# Patient Record
Sex: FEMALE | Race: BLACK OR AFRICAN AMERICAN | Employment: OTHER | URBAN - METROPOLITAN AREA
[De-identification: names, ages, dates, MRNs, and addresses within clinical notes are randomized per-mention and may not be internally consistent; named-entity substitution may affect disease eponyms.]

---

## 2017-02-15 ENCOUNTER — OFFICE VISIT (OUTPATIENT)
Dept: CARDIOLOGY CLINIC | Age: 82
End: 2017-02-15

## 2017-02-15 DIAGNOSIS — Z95.0 CARDIAC PACEMAKER IN SITU: Primary | ICD-10-CM

## 2017-04-18 ENCOUNTER — OFFICE VISIT (OUTPATIENT)
Dept: CARDIOLOGY CLINIC | Age: 82
End: 2017-04-18

## 2017-04-18 DIAGNOSIS — Z95.0 CARDIAC PACEMAKER IN SITU: Primary | ICD-10-CM

## 2017-06-21 ENCOUNTER — OFFICE VISIT (OUTPATIENT)
Dept: CARDIOLOGY CLINIC | Age: 82
End: 2017-06-21

## 2017-06-21 VITALS
DIASTOLIC BLOOD PRESSURE: 70 MMHG | HEIGHT: 65 IN | HEART RATE: 73 BPM | RESPIRATION RATE: 16 BRPM | WEIGHT: 162.8 LBS | OXYGEN SATURATION: 97 % | SYSTOLIC BLOOD PRESSURE: 128 MMHG | BODY MASS INDEX: 27.12 KG/M2

## 2017-06-21 DIAGNOSIS — I10 ESSENTIAL HYPERTENSION: ICD-10-CM

## 2017-06-21 DIAGNOSIS — I44.1 MOBITZ (TYPE) II ATRIOVENTRICULAR BLOCK: ICD-10-CM

## 2017-06-21 DIAGNOSIS — I35.0 MILD AORTIC STENOSIS: ICD-10-CM

## 2017-06-21 DIAGNOSIS — R06.02 SOB (SHORTNESS OF BREATH): Primary | ICD-10-CM

## 2017-06-21 NOTE — PROGRESS NOTES
Ally Dillon MD. [facility] [place]              Patient: Kristina Jain  : 1931      Today's Date: 2017            HISTORY OF PRESENT ILLNESS:     History of Present Illness:  Ms. Berenice Torres is here for follow-up. She had eye surgery and can see better. She gets out of breath when she bends over or walks too fast - happening past several months. She uses a cane to walk. No orthopnea or SOB at rest.  No CP. PAST MEDICAL HISTORY:     Past Medical History:   Diagnosis Date    Coronary atherosclerosis of native coronary artery     adeno card : fixed inf defect, EF 47%. Echo : EF 55%, mild MR.    Diabetes ([address]     HTN (hypertension)     Mild aortic stenosis     Mobitz (type) II atrioventricular block     Pure hypercholesterolemia          Past Surgical History:   Procedure Laterality Date    HX PACEMAKER      DDD, V lead Fx .  HX PACEMAKER      Gen Change    HX PACEMAKER      Ventricular lead failure. No access through stenosed left subclavian. No access from right side due to stenosis of SVC-RA junction. MEDICATIONS:     Current Outpatient Prescriptions   Medication Sig Dispense Refill    B.infantis-B.ani-B.long-B.bifi (PROBIOTIC 4X) 10-15 mg TbEC Take  by mouth daily.  levothyroxine (SYNTHROID) 50 mcg tablet Take  by mouth Daily (before breakfast).  Cholecalciferol, Vitamin D3, (VITAMIN D3) 1,000 unit cap Take 1,000 Units by mouth daily.  GLUCOSAMINE/CHONDROITIN SULF A (GLUCOSAMINE-CHONDROITIN PO) Take 2,000 mg by mouth.  amLODIPine (NORVASC) 5 mg tablet Take 5 mg by mouth daily.  ranitidine hcl 150 mg capsule Take 150 mg by mouth two (2) times a day.  latanoprost (XALATAN) 0.005 % ophthalmic solution Administer 1 Drop to both eyes nightly.  hydrochlorothiazide (HYDRODIURIL) 25 mg tablet Take 25 mg by mouth daily.  aspirin delayed-release (ADULT LOW DOSE ASPIRIN) 81 mg tablet Take 81 mg by mouth daily.       metformin (GLUCOPHAGE) 500 mg tablet Take  by mouth two (2) times daily (with meals).  METOPROLOL SUCCINATE PO Take 100 mg by mouth. Allergies   Allergen Reactions    Lisinopril Rash     Face swelling    Simvastatin Unknown (comments)     Dry lips and mouth             SOCIAL HISTORY:     Social History   Substance Use Topics    Smoking status: Former Smoker    Smokeless tobacco: Never Used    Alcohol use 0.0 oz/week     0 Standard drinks or equivalent per week      Comment: wine twice yearly         FAMILY HISTORY:     Family History   Problem Relation Age of Onset    Hypertension Mother              REVIEW OF SYSTEMS:       Review of Systems:    Constitutional: Negative for fever, chills    HEENT: Negative for vision changes.    Respiratory: Negative for cough    Cardiovascular: Negative for orthopnea, syncope, and PND.    Gastrointestinal: Negative for abdominal pain, diarrhea, or melena    Genitourinary: Negative for dysuria    Musculoskeletal: Negative for myalgias.    Skin: Negative for rash    Heme: No problems bleeding.    Neurological: Negative for speech change and focal weakness.    + chronic constipation                PHYSICAL EXAM:       Physical Exam:   Visit Vitals    /70    Pulse 73    Resp 16    Ht 5' 5\" (1.651 m)    Wt 162 lb 12.8 oz (73.8 kg)    SpO2 97%    BMI 27.09 kg/m2      Patient appears generally well, mood and affect are appropriate and pleasant.    HEENT: Normocephalic, atraumatic.  Hearing intact. Sclera anicteric.    Neck Exam: Supple, No JVD sitting up, + bilat neck bruits  Lung Exam: Clear to auscultation, even breath sounds.    Cardiac Exam: Regular rate and rhythm with 2/6 systolic mumur.    Abdomen: Soft, non-tender, normal bowel sounds.    Extremities: No lower extremity edema.  MAW  Psych - appropriate affect   Vasc - 2+ DP's  Neuro - non focal                   LABS / OTHER STUDIES:       Labs 2/23/15 - chol 172, HDL 44, , , CMP OK, TSH 1.2  Labs 1/1/116 - CMP OK, chol 198, HDL 51, , , TSH 1.2, A1c 7.3,   Labs 8/22/16 - CMP OK, chol 182, , HDL 47, , TSH 1, A1c 6.5  Labs 2/16 - chol 182, HDL 47, ,   Labs 2/17 - CMP Ok (Cr 1.17, glc 113), A1c 6.6    Lab Results   Component Value Date/Time    WBC 6.3 11/17/2016 11:36 AM    HGB 10.4 11/17/2016 11:36 AM    HCT 31.5 11/17/2016 11:36 AM    PLATELET 133 49/51/5174 11:36 AM    MCV 68 11/17/2016 11:36 AM           CARDIAC DIAGNOSTICS:       Cardiac Evaluation Includes:  Carotid dopplers 6/23/14 - 10-49% stenosis bilat  Echo 6/3/15 - TDS - mod LVH, LVEF 55%, mod MAC and mild MR, mild AS (mean gradient 14), mild LAE  Lexiscan Cardiolite 12/1/16 - + breast attenuation, but no ischemia. LVEF 51%. Low risk findings.       EKG 6/3/15 - A sensed, V paced  EKG 6/22/16 - V paced   EKG 6/21/17 - V paced                ASSESSMENT AND PLAN:       Assessment and Plan:    1) HTN    - BP looks good - continue current regimen      2) Carotid Artery disease - mild    - Carotid dopplers 6/23/14 - 10-49% stenosis bilat     3) Borderline Dyslipidemia    - A statin is reasonable given her DM and mild carotid disease.  I reviewed my recommendation for a statin.     - Ms. Teresa though says she does not want to take any statins due to potential side effects.    4) History of high grade Av block s/p pacer    - denies cardiac complaints    - Continue pacer checks as usual   - next check 6/30/17      5) Mild AS   - recheck an echo     6) BEDOYA for several months  - Lexiscan Cardiolite 12/1/16 - + breast attenuation, but no ischemia. LVEF 51%. Low risk findings. - proBNP was normal 11/16   - Check an echo   - workup thus far OK - encouraged her to exercise     6) Phone FU after testing. RTC in 6 months. Patient expressed understanding of the plan - questions were answered. Lives with her daughter.   Son in [place], [address] [facility] [place] [address]  [address], [address], Afia Sorto [address] [place] [address]  Ph: [phone]                                                             Ph [phone]           ADDENDUM   7/10/2017  Echo 7/7/17 - TDS. Mod LVH. LVEF 55%. Mild-mod AS (mean gradient 19. LAST 1.4). Mild LAE. RVSP 40    Workup thus far (stress test, proBNP, echo, labs) have been unremarkable / stable past several months. CXR today without acute process. I called patient and reviewed plan and findings. She will proceed with generator change. Repeat labs are pending. If problem persist, I've asked her to come back to see me earlier and I'll reassess.

## 2017-06-21 NOTE — PROGRESS NOTES
Visit Vitals    /70    Pulse 73    Resp 16    Ht 5' 5\" (1.651 m)    Wt 162 lb 12.8 oz (73.8 kg)    SpO2 97%    BMI 27.09 kg/m2     Chief Complaint   Patient presents with    Hypertension     annual follow up    Coronary Artery Disease    Cholesterol Problem     Pure hypercholesterolemia    Shortness of Breath     bending over or performing certain activities - due for pacemaker change this year

## 2017-06-21 NOTE — MR AVS SNAPSHOT
Visit Information Date & Time Provider Department Dept. Phone Encounter #  
 6/21/2017 10:00 AM Diony Bradley MD [facility] [phone] [id number] Your Appointments 6/30/2017 10:15 AM  
PACEMAKER with PACEMAKERYOSHI  
[facility] ([facility] SCHEDULING) Appt Note: med threshold/stf/b 2-15-17  near [address] [place] [address] [address] [place] [address]  
[phone]  
  
   
 [address] [place] [address]  
  
    
 7/7/2017 11:00 AM  
ECHO CARDIOGRAMS 2D with ECHODario [facility] ([place]EDULING) Appt Note: echo dx: AS dr [place] Offer [address] [address]  
[phone]  
  
   
 [address] [address]  
  
    
 12/6/2017 10:00 AM  
ESTABLISHED PATIENT with Diony Bradley MD  
[facility] ([place] SCHEDULING) Appt Note: 6 mo fu appt  [address] [employer] [address] [address] [phone]  
  
   
 [address] [address] Upcoming Health Maintenance Date Due  
 FOOT EXAM Q1 7/8/1941 MICROALBUMIN Q1 7/8/1941 EYE EXAM RETINAL OR DILATED Q1 7/8/1941 DTaP/Tdap/Td series (1 - Tdap) 7/8/1952 ZOSTER VACCINE AGE 60> 7/8/1991 GLAUCOMA SCREENING Q2Y 7/8/1996 OSTEOPOROSIS SCREENING (DEXA) 7/8/1996 Pneumococcal 65+ Low/Medium Risk (1 of 2 - PCV13) 7/8/1996 MEDICARE YEARLY EXAM 7/8/1996 HEMOGLOBIN A1C Q6M 8/19/2016 LIPID PANEL Q1 2/19/2017 INFLUENZA AGE 9 TO ADULT 8/1/2017 Allergies as of 6/21/2017  Review Complete On: 6/21/2017 By: Zulma Sharpe LPN Severity Noted Reaction Type Reactions Lisinopril  09/23/2010    Rash Face swelling Simvastatin  09/23/2010    Unknown (comments) Dry lips and mouth Current Immunizations  Never Reviewed No immunizations on file. Not reviewed this visit You Were Diagnosed With   
  
 Codes Comments SOB (shortness of breath)    -  Primary ICD-10-CM: R06.02 
ICD-9-CM: 786.05 Essential hypertension     ICD-10-CM: I10 
ICD-9-CM: 401.9 Mild aortic stenosis     ICD-10-CM: I35.0 ICD-9-CM: 424.1 Mobitz (type) II atrioventricular block     ICD-10-CM: I44.1 ICD-9-CM: 426.12 Vitals BP Pulse Resp Height(growth percentile) Weight(growth percentile) SpO2  
 128/70 73 16 5' 5\" (1.651 m) 162 lb 12.8 oz (73.8 kg) 97% BMI OB Status Smoking Status 27.09 kg/m2 Hysterectomy Former Smoker Vitals History BMI and BSA Data Body Mass Index Body Surface Area  
 27.09 kg/m 2 1.84 m 2 Preferred Pharmacy Pharmacy Name Phone [facility], [address] [phone] Your Updated Medication List  
  
   
This list is accurate as of: 6/21/17 10:41 AM.  Always use your most recent med list.  
  
  
  
  
 ADULT LOW DOSE ASPIRIN 81 mg tablet Generic drug:  aspirin delayed-release Take 81 mg by mouth daily. amLODIPine 5 mg tablet Commonly known as:  [place] Take 5 mg by mouth daily. GLUCOSAMINE-CHONDROITIN PO Take 2,000 mg by mouth. hydroCHLOROthiazide 25 mg tablet Commonly known as:  HYDRODIURIL Take 25 mg by mouth daily. levothyroxine 50 mcg tablet Commonly known as:  SYNTHROID Take  by mouth Daily (before breakfast). metFORMIN 500 mg tablet Commonly known as:  GLUCOPHAGE Take  by mouth two (2) times daily (with meals). METOPROLOL SUCCINATE PO Take 100 mg by mouth. PROBIOTIC 4X 10-15 mg Tbec Generic drug:  B.infantis-B.ani-B.long-B.bifi Take  by mouth daily. raNITIdine hcl 150 mg capsule Take 150 mg by mouth two (2) times a day. VITAMIN D3 1,000 unit Cap Generic drug:  cholecalciferol Take 1,000 Units by mouth daily. XALATAN 0.005 % ophthalmic solution Generic drug:  latanoprost  
 Administer 1 Drop to both eyes nightly. Introducing [facility]! David Marks introduces [employer] patient portal. Now you can access parts of your medical record, email your doctor's office, and request medication refills online. 1. In your internet browser, go to [email or web address] (app)t 2. Click on the First Time User? Click Here link in the Sign In box. You will see the New Member Sign Up page. 3. Enter your [employer]yo Access Code exactly as it appears below. You will not need to use this code after youve completed the sign-up process. If you do not sign up before the expiration date, you must request a new code. · [employer] Access Code: [email or web address] Expires: 9/19/2017 10:41 AM 
 
4. Enter the last four digits of your Social Security Number (xxxx) and Date of Birth (mm/dd/yyyy) as indicated and click Submit. You will be taken to the next sign-up page. 5. Create a Curiyo ID. This will be your C[employer] login ID and cannot be changed, so think of one that is secure and easy to remember. 6. Create a Curiyo password. You can change your password at any time. 7. Enter your Password Reset Question and Answer. This can be used at a later time if you forget your password. 8. Enter your e-mail address. You will receive e-mail notification when new information is available in [address] 9. Click Sign Up. You can now view and download portions of your medical record. 10. Click the Download Summary menu link to download a portable copy of your medical information. If you have questions, please visit the Frequently Asked Questions section of the C[employer] website. Remember, [employer] is NOT to be used for urgent needs. For medical emergencies, dial 911. Now available from your iPhone and Android! Please provide this summary of care documentation to your next provider. Your primary care clinician is listed as Adriana Arias.  If you have any questions after today's visit, please call [phone].

## 2017-07-05 ENCOUNTER — CLINICAL SUPPORT (OUTPATIENT)
Dept: CARDIOLOGY CLINIC | Age: 82
End: 2017-07-05

## 2017-07-05 DIAGNOSIS — Z95.0 CARDIAC PACEMAKER IN SITU: Primary | ICD-10-CM

## 2017-07-07 ENCOUNTER — CLINICAL SUPPORT (OUTPATIENT)
Dept: CARDIOLOGY CLINIC | Age: 82
End: 2017-07-07

## 2017-07-07 DIAGNOSIS — I10 ESSENTIAL HYPERTENSION: ICD-10-CM

## 2017-07-07 DIAGNOSIS — E78.00 PURE HYPERCHOLESTEROLEMIA: ICD-10-CM

## 2017-07-07 DIAGNOSIS — I35.0 MILD AORTIC STENOSIS: Primary | ICD-10-CM

## 2017-07-07 DIAGNOSIS — I44.1 MOBITZ (TYPE) II ATRIOVENTRICULAR BLOCK: ICD-10-CM

## 2017-07-10 ENCOUNTER — HOSPITAL ENCOUNTER (OUTPATIENT)
Dept: LAB | Age: 82
Discharge: HOME | End: 2017-07-10
Payer: MEDICARE

## 2017-07-10 ENCOUNTER — OFFICE VISIT (OUTPATIENT)
Dept: CARDIOLOGY CLINIC | Age: 82
End: 2017-07-10

## 2017-07-10 ENCOUNTER — HOSPITAL ENCOUNTER (OUTPATIENT)
Dept: GENERAL RADIOLOGY | Age: 82
Discharge: HOME | End: 2017-07-10
Payer: MEDICARE

## 2017-07-10 VITALS
RESPIRATION RATE: 16 BRPM | DIASTOLIC BLOOD PRESSURE: 60 MMHG | OXYGEN SATURATION: 95 % | SYSTOLIC BLOOD PRESSURE: 130 MMHG | HEART RATE: 60 BPM | WEIGHT: 163 LBS | HEIGHT: 65 IN | BODY MASS INDEX: 27.16 KG/M2

## 2017-07-10 DIAGNOSIS — R06.02 SHORTNESS OF BREATH: ICD-10-CM

## 2017-07-10 DIAGNOSIS — R06.02 SHORTNESS OF BREATH: Primary | ICD-10-CM

## 2017-07-10 DIAGNOSIS — Z01.818 PREOP TESTING: ICD-10-CM

## 2017-07-10 PROCEDURE — 85025 COMPLETE CBC W/AUTO DIFF WBC: CPT

## 2017-07-10 PROCEDURE — 85610 PROTHROMBIN TIME: CPT

## 2017-07-10 PROCEDURE — 80048 BASIC METABOLIC PNL TOTAL CA: CPT

## 2017-07-10 PROCEDURE — 71020 XR CHEST PA LAT: CPT

## 2017-07-10 PROCEDURE — 36415 COLL VENOUS BLD VENIPUNCTURE: CPT

## 2017-07-10 NOTE — PROGRESS NOTES
HISTORY OF PRESENTING ILLNESS      Teresa James is a 80 y.o. female with hypertension, diabetes, dyslipidemia, bradycardia and pacemaker whose pacemaker is noted to be nearing VASHTI. She presents to discuss pacemaker generator change. She has experienced SOB/BEDOYA recently. Echocardiogram was performed last Friday but has yet to be read. Recent bloodwork was performed by her PCP but not available for review yet. Denies recent long-distance travel/immobility. ACTIVE PROBLEM LIST     Patient Active Problem List    Diagnosis Date Noted    Mild aortic stenosis 06/22/2016    HTN (hypertension) 06/05/2013    Mobitz (type) II atrioventricular block     Pure hypercholesterolemia     Diabetes [address] [address]            PAST MEDICAL HISTORY     Past Medical History:   Diagnosis Date    Coronary atherosclerosis of native coronary artery     adeno card 2008: fixed inf defect, EF 47%. Echo 2008: EF 55%, mild MR.    Diabetes ([address]     HTN (hypertension)     Mild aortic stenosis     Mobitz (type) II atrioventricular block     Pure hypercholesterolemia            PAST SURGICAL HISTORY     Past Surgical History:   Procedure Laterality Date    HX PACEMAKER  2008    DDD, V lead Fx 2009.  HX PACEMAKER  2008    Gen Change    HX PACEMAKER  2009    Ventricular lead failure. No access through stenosed left subclavian. No access from right side due to stenosis of SVC-RA junction.            ALLERGIES     Allergies   Allergen Reactions    Lisinopril Rash     Face swelling    Simvastatin Unknown (comments)     Dry lips and mouth          FAMILY HISTORY     Family History   Problem Relation Age of Onset    Hypertension Mother     negative for cardiac disease       SOCIAL HISTORY     Social History     Social History    Marital status:      Spouse name: N/A    Number of children: N/A    Years of education: N/A     Social History Main Topics    Smoking status: Former Smoker    Smokeless tobacco: Never Used    Alcohol use 0.0 oz/week     0 Standard drinks or equivalent per week      Comment: wine twice yearly    Drug use: No    Sexual activity: Not Asked     Other Topics Concern    None     Social History Narrative         MEDICATIONS     Current Outpatient Prescriptions   Medication Sig    B.infantis-B.ani-B.long-B.bifi (PROBIOTIC 4X) 10-15 mg TbEC Take  by mouth daily.  levothyroxine (SYNTHROID) 50 mcg tablet Take  by mouth Daily (before breakfast).  Cholecalciferol, Vitamin D3, (VITAMIN D3) 1,000 unit cap Take 1,000 Units by mouth daily.  GLUCOSAMINE/CHONDROITIN SULF A (GLUCOSAMINE-CHONDROITIN PO) Take 2,000 mg by mouth.  amLODIPine (NORVASC) 5 mg tablet Take 5 mg by mouth daily.  ranitidine hcl 150 mg capsule Take 150 mg by mouth two (2) times a day.  latanoprost (XALATAN) 0.005 % ophthalmic solution Administer 1 Drop to both eyes nightly.  hydrochlorothiazide (HYDRODIURIL) 25 mg tablet Take 25 mg by mouth daily.  aspirin delayed-release (ADULT LOW DOSE ASPIRIN) 81 mg tablet Take 81 mg by mouth daily.  metformin (GLUCOPHAGE) 500 mg tablet Take  by mouth two (2) times daily (with meals).  METOPROLOL SUCCINATE PO Take 100 mg by mouth. No current facility-administered medications for this visit. I have reviewed the nurses notes, vitals, problem list, allergy list, medical history, family, social history and medications. REVIEW OF SYMPTOMS      General: Pt denies excessive weight gain or loss. Pt is able to conduct ADL's  HEENT: Denies blurred vision, headaches, hearing loss, epistaxis and difficulty swallowing. Respiratory: Denies cough, congestion, shortness of breath, BEDOYA, wheezing or stridor.   Cardiovascular: Denies precordial pain, palpitations, edema or PND  Gastrointestinal: Denies poor appetite, indigestion, abdominal pain or blood in stool  Genitourinary: Denies hematuria, dysuria, increased urinary frequency  Musculoskeletal: Denies joint pain or swelling from muscles or joints  Neurologic: Denies tremor, paresthesias, headache, or sensory motor disturbance  Psychiatric: Denies confusion, insomnia, depression  Integumentray: Denies rash, itching or ulcers. Hematologic: Denies easy bruising, bleeding     PHYSICAL EXAMINATION      Vitals:    07/10/17 0949   BP: 130/60   Pulse: 60   Resp: 16   SpO2: 95%   Weight: 163 lb (73.9 kg)   Height: 5' 5\" (1.651 m)     General: Well developed, in no acute distress. HEENT: No jaundice, oral mucosa moist, no oral ulcers  Neck: Supple, no stiffness, no lymphadenopathy, supple  Heart:  Normal S1/S2 negative S3 or S4. Regular, no murmur, gallop or rub, no jugular venous distention  Respiratory: Clear bilaterally x 4, no wheezing or rales  Abdomen:   Soft, non-tender, bowel sounds are active.   Extremities:  No edema, normal cap refill, no cyanosis. Musculoskeletal: No clubbing, no deformities  Neuro: A&Ox3, speech clear, gait stable, cooperative, no focal neurologic deficits  Skin: Skin color is normal. No rashes or lesions.  Non diaphoretic, moist.  Vascular: 2+ pulses symmetric in all extremities       DIAGNOST IC DATA     EKG: Sinus rhythm with ventricular pacing on 6/28/17       LABORATORY DATA      Lab Results   Component Value Date/Time    WBC 6.3 11/17/2016 11:36 AM    HGB 10.4 11/17/2016 11:36 AM    HCT 31.5 11/17/2016 11:36 AM    PLATELET 806 61/84/4180 11:36 AM    MCV 68 11/17/2016 11:36 AM      Lab Results   Component Value Date/Time    Sodium 132 06/07/2009 03:00 AM    Potassium 4.0 06/07/2009 03:00 AM    Chloride 97 06/07/2009 03:00 AM    CO2 26 06/07/2009 03:00 AM    Anion gap 9 06/07/2009 03:00 AM    Glucose 133 06/07/2009 03:00 AM    BUN 25 06/07/2009 03:00 AM    Creatinine 1.1 06/07/2009 03:00 AM    BUN/Creatinine ratio 23 06/07/2009 03:00 AM    GFR est AA >60 06/07/2009 03:00 AM    GFR est non-AA 51 06/07/2009 03:00 AM    Calcium 8.7 06/07/2009 03:00 AM    Bilirubin, total 0.6 06/02/2009 04:00 PM    AST (SGOT) 17 06/02/2009 04:00 PM    Alk. phosphatase 76 06/02/2009 04:00 PM    Protein, total 7.5 06/02/2009 04:00 PM    Albumin 4.2 06/02/2009 04:00 PM    Globulin 3.3 06/02/2009 04:00 PM    A-G Ratio 1.3 06/02/2009 04:00 PM    ALT (SGPT) 25 06/02/2009 04:00 PM           ASSESSMENT      1. Pacemaker  2. Diabetes  3. Hypertension  4. Dyslipidemia  5. Bradycardia       PLAN     Obtain copy of recent bloodwork. Obtain chest x-ray to further evaluate etiology of recent SOB. Follow up echocardiogram results from 3 days ago (not read yet). Plan for pacemaker generator change (unless EF has changed). FOLLOW-UP     1 week post procedure      Thank you,  Leyla Roger MD and Dr. Darling Franks for involving me in the care of this extraordinarily pleasant female. Please do not hesitate to contact me for further questions/concerns.          Ekaterina Jurado MD  Cardiac Electrophysiology / Cardiology    [facility].  [address] [place] [address], [address]  [phone] / [phone] Fax   [phone] / [phone] Fax

## 2017-07-10 NOTE — PATIENT INSTRUCTIONS
Treatment Plan:  Chest Xray      You are scheduled for a pacemaker generator change at [facility] on Friday, July 21st.    Please arrive at the patient registration located on the 2nd floor of the main building at 7:00am.    Do not eat or drink anything after midnight the night before your procedure. You will need a . You may take your normal medications with a sip of water the morning of your procedure. Blood thinner instructions: Continue daily aspirin    Lab instructions: Please have labs drawn 7-10 days prior to scheduled procedure. Please call Ryann Linares or Jordi Pires if you have any questions at [phone]. Please call the office if you develop any type of illness prior to your procedure.

## 2017-07-11 ENCOUNTER — TELEPHONE (OUTPATIENT)
Dept: CARDIOLOGY CLINIC | Age: 82
End: 2017-07-11

## 2017-07-11 LAB
BASOPHILS # BLD AUTO: 0.1 X10E3/UL (ref 0–0.2)
BASOPHILS NFR BLD AUTO: 2 %
BUN SERPL-MCNC: 30 MG/DL (ref 8–27)
BUN/CREAT SERPL: 21 (ref 12–28)
CALCIUM SERPL-MCNC: 9.6 MG/DL (ref 8.7–10.3)
CHLORIDE SERPL-SCNC: 102 MMOL/L (ref 96–106)
CO2 SERPL-SCNC: 20 MMOL/L (ref 18–29)
CREAT SERPL-MCNC: 1.4 MG/DL (ref 0.57–1)
EOSINOPHIL # BLD AUTO: 0.1 X10E3/UL (ref 0–0.4)
EOSINOPHIL NFR BLD AUTO: 2 %
ERYTHROCYTE [DISTWIDTH] IN BLOOD BY AUTOMATED COUNT: 18.1 % (ref 12.3–15.4)
GLUCOSE SERPL-MCNC: 193 MG/DL (ref 65–99)
HCT VFR BLD AUTO: 34 % (ref 34–46.6)
HGB BLD-MCNC: 11.2 G/DL (ref 11.1–15.9)
IMM GRANULOCYTES # BLD: 0 X10E3/UL (ref 0–0.1)
IMM GRANULOCYTES NFR BLD: 1 %
INR PPP: 1 (ref 0.8–1.2)
INTERPRETATION: NORMAL
LYMPHOCYTES # BLD AUTO: 1.4 X10E3/UL (ref 0.7–3.1)
LYMPHOCYTES NFR BLD AUTO: 27 %
MCH RBC QN AUTO: 24 PG (ref 26.6–33)
MCHC RBC AUTO-ENTMCNC: 32.9 G/DL (ref 31.5–35.7)
MCV RBC AUTO: 73 FL (ref 79–97)
MONOCYTES # BLD AUTO: 0.5 X10E3/UL (ref 0.1–0.9)
MONOCYTES NFR BLD AUTO: 10 %
NEUTROPHILS # BLD AUTO: 3.2 X10E3/UL (ref 1.4–7)
NEUTROPHILS NFR BLD AUTO: 58 %
PLATELET # BLD AUTO: 247 X10E3/UL (ref 150–379)
POTASSIUM SERPL-SCNC: 4.3 MMOL/L (ref 3.5–5.2)
PROTHROMBIN TIME: 10.2 SEC (ref 9.1–12)
RBC # BLD AUTO: 4.66 X10E6/UL (ref 3.77–5.28)
SODIUM SERPL-SCNC: 141 MMOL/L (ref 134–144)
WBC # BLD AUTO: 5.3 X10E3/UL (ref 3.4–10.8)

## 2017-07-14 RX ORDER — SODIUM CHLORIDE 0.9 % (FLUSH) 0.9 %
5-10 SYRINGE (ML) INJECTION AS NEEDED
Status: CANCELLED | OUTPATIENT
Start: 2017-07-14

## 2017-07-14 RX ORDER — SODIUM CHLORIDE 0.9 % (FLUSH) 0.9 %
5-10 SYRINGE (ML) INJECTION EVERY 8 HOURS
Status: CANCELLED | OUTPATIENT
Start: 2017-07-14

## 2017-07-21 ENCOUNTER — HOSPITAL ENCOUNTER (OUTPATIENT)
Dept: NON INVASIVE DIAGNOSTICS | Age: 82
Discharge: HOME | End: 2017-07-21
Attending: INTERNAL MEDICINE | Admitting: INTERNAL MEDICINE
Payer: MEDICARE

## 2017-07-21 VITALS
SYSTOLIC BLOOD PRESSURE: 121 MMHG | HEART RATE: 65 BPM | OXYGEN SATURATION: 99 % | DIASTOLIC BLOOD PRESSURE: 54 MMHG | WEIGHT: 157.19 LBS | HEIGHT: 65 IN | RESPIRATION RATE: 18 BRPM | TEMPERATURE: 97.5 F | BODY MASS INDEX: 26.19 KG/M2

## 2017-07-21 LAB
GLUCOSE BLD STRIP.AUTO-MCNC: 115 MG/DL (ref 65–100)
SERVICE CMNT-IMP: ABNORMAL

## 2017-07-21 PROCEDURE — 74011000272 HC RX REV CODE- 272: Performed by: INTERNAL MEDICINE

## 2017-07-21 PROCEDURE — 99152 MOD SED SAME PHYS/QHP 5/>YRS: CPT | Performed by: INTERNAL MEDICINE

## 2017-07-21 PROCEDURE — 77030028698 HC BLD TISS PLSM MEDT -D

## 2017-07-21 PROCEDURE — 99153 MOD SED SAME PHYS/QHP EA: CPT | Performed by: INTERNAL MEDICINE

## 2017-07-21 PROCEDURE — 74011000250 HC RX REV CODE- 250: Performed by: INTERNAL MEDICINE

## 2017-07-21 PROCEDURE — 77030011640 HC PAD GRND REM COVD -A

## 2017-07-21 PROCEDURE — 77030012935 HC DRSG AQUACEL BMS -B

## 2017-07-21 PROCEDURE — 77030018673

## 2017-07-21 PROCEDURE — C1785 PMKR, DUAL, RATE-RESP: HCPCS | Performed by: INTERNAL MEDICINE

## 2017-07-21 PROCEDURE — 77030018729 HC ELECTRD DEFIB PAD CARD -B

## 2017-07-21 PROCEDURE — 74011250636 HC RX REV CODE- 250/636: Performed by: INTERNAL MEDICINE

## 2017-07-21 PROCEDURE — 77030008459 HC STPLR SKN COOP -B

## 2017-07-21 PROCEDURE — 77030031139 HC SUT VCRL2 J&J -A

## 2017-07-21 PROCEDURE — 33262 RMVL& REPLC PULSE GEN 1 LEAD: CPT

## 2017-07-21 PROCEDURE — 82962 GLUCOSE BLOOD TEST: CPT

## 2017-07-21 RX ORDER — MIDAZOLAM HYDROCHLORIDE 1 MG/ML
.5-1 INJECTION, SOLUTION INTRAMUSCULAR; INTRAVENOUS
Status: DISCONTINUED | OUTPATIENT
Start: 2017-07-21 | End: 2017-07-21 | Stop reason: HOSPADM

## 2017-07-21 RX ORDER — GENTAMICIN SULFATE 80 MG/100ML
80 INJECTION, SOLUTION INTRAVENOUS ONCE
Status: COMPLETED | OUTPATIENT
Start: 2017-07-21 | End: 2017-07-21

## 2017-07-21 RX ORDER — SODIUM CHLORIDE 0.9 % (FLUSH) 0.9 %
5-10 SYRINGE (ML) INJECTION AS NEEDED
Status: DISCONTINUED | OUTPATIENT
Start: 2017-07-21 | End: 2017-07-21 | Stop reason: HOSPADM

## 2017-07-21 RX ORDER — FENTANYL CITRATE 50 UG/ML
25-200 INJECTION, SOLUTION INTRAMUSCULAR; INTRAVENOUS
Status: DISCONTINUED | OUTPATIENT
Start: 2017-07-21 | End: 2017-07-21 | Stop reason: HOSPADM

## 2017-07-21 RX ORDER — SODIUM CHLORIDE 0.9 % (FLUSH) 0.9 %
5-10 SYRINGE (ML) INJECTION EVERY 8 HOURS
Status: DISCONTINUED | OUTPATIENT
Start: 2017-07-21 | End: 2017-07-21 | Stop reason: HOSPADM

## 2017-07-21 RX ORDER — HYDROCODONE BITARTRATE AND ACETAMINOPHEN 5; 325 MG/1; MG/1
1 TABLET ORAL
Status: DISCONTINUED | OUTPATIENT
Start: 2017-07-21 | End: 2017-07-21 | Stop reason: HOSPADM

## 2017-07-21 RX ORDER — BUPIVACAINE HYDROCHLORIDE 5 MG/ML
20 INJECTION, SOLUTION EPIDURAL; INTRACAUDAL ONCE
Status: COMPLETED | OUTPATIENT
Start: 2017-07-21 | End: 2017-07-21

## 2017-07-21 RX ORDER — LIDOCAINE HYDROCHLORIDE AND EPINEPHRINE 10; 10 MG/ML; UG/ML
20 INJECTION, SOLUTION INFILTRATION; PERINEURAL ONCE
Status: COMPLETED | OUTPATIENT
Start: 2017-07-21 | End: 2017-07-21

## 2017-07-21 RX ORDER — CEPHALEXIN 500 MG/1
500 CAPSULE ORAL 3 TIMES DAILY
Qty: 15 CAP | Refills: 0 | Status: SHIPPED | OUTPATIENT
Start: 2017-07-21 | End: 2017-07-26

## 2017-07-21 RX ORDER — CEFAZOLIN SODIUM IN 0.9 % NACL 2 G/50 ML
2 INTRAVENOUS SOLUTION, PIGGYBACK (ML) INTRAVENOUS ONCE
Status: COMPLETED | OUTPATIENT
Start: 2017-07-21 | End: 2017-07-21

## 2017-07-21 RX ORDER — BACITRACIN 50000 [IU]/1
INJECTION, POWDER, FOR SOLUTION INTRAMUSCULAR
Status: DISCONTINUED
Start: 2017-07-21 | End: 2017-07-21 | Stop reason: HOSPADM

## 2017-07-21 RX ORDER — HEPARIN SODIUM 200 [USP'U]/100ML
500 INJECTION, SOLUTION INTRAVENOUS ONCE
Status: COMPLETED | OUTPATIENT
Start: 2017-07-21 | End: 2017-07-21

## 2017-07-21 RX ORDER — ACETAMINOPHEN 325 MG/1
650 TABLET ORAL
Status: DISCONTINUED | OUTPATIENT
Start: 2017-07-21 | End: 2017-07-21 | Stop reason: HOSPADM

## 2017-07-21 RX ADMIN — MIDAZOLAM HYDROCHLORIDE 1 MG: 1 INJECTION, SOLUTION INTRAMUSCULAR; INTRAVENOUS at 09:00

## 2017-07-21 RX ADMIN — BACITRACIN: 50000 INJECTION, POWDER, FOR SOLUTION INTRAMUSCULAR at 09:10

## 2017-07-21 RX ADMIN — FENTANYL CITRATE 25 MCG: 50 INJECTION, SOLUTION INTRAMUSCULAR; INTRAVENOUS at 08:59

## 2017-07-21 RX ADMIN — LIDOCAINE HYDROCHLORIDE,EPINEPHRINE BITARTRATE 200 MG: 10; .01 INJECTION, SOLUTION INFILTRATION; PERINEURAL at 09:01

## 2017-07-21 RX ADMIN — MIDAZOLAM HYDROCHLORIDE 1 MG: 1 INJECTION, SOLUTION INTRAMUSCULAR; INTRAVENOUS at 09:12

## 2017-07-21 RX ADMIN — MIDAZOLAM HYDROCHLORIDE 1 MG: 1 INJECTION, SOLUTION INTRAMUSCULAR; INTRAVENOUS at 09:03

## 2017-07-21 RX ADMIN — MIDAZOLAM HYDROCHLORIDE 2 MG: 1 INJECTION, SOLUTION INTRAMUSCULAR; INTRAVENOUS at 08:59

## 2017-07-21 RX ADMIN — FENTANYL CITRATE 25 MCG: 50 INJECTION, SOLUTION INTRAMUSCULAR; INTRAVENOUS at 09:00

## 2017-07-21 RX ADMIN — BUPIVACAINE HYDROCHLORIDE 100 MG: 5 INJECTION, SOLUTION EPIDURAL; INTRACAUDAL at 09:01

## 2017-07-21 RX ADMIN — CEFAZOLIN 2 G: 1 INJECTION, POWDER, FOR SOLUTION INTRAMUSCULAR; INTRAVENOUS; PARENTERAL at 08:37

## 2017-07-21 RX ADMIN — HEPARIN SODIUM 1000 UNITS: 200 INJECTION, SOLUTION INTRAVENOUS at 09:01

## 2017-07-21 RX ADMIN — GENTAMICIN SULFATE 80 MG: 80 INJECTION, SOLUTION INTRAVENOUS at 09:16

## 2017-07-21 NOTE — IP AVS SNAPSHOT
Susie Couch 
 
 
 [address] [facility] 
[phone] Patient: William Gonzalez MRN: [id number] YAL:[birth year] You are allergic to the following Allergen Reactions Lisinopril Rash Face swelling Simvastatin Unknown (comments) Dry lips and mouth Recent Documentation Height Weight BMI OB Status Smoking Status 1.651 m 71.3 kg 26.16 kg/m2 Hysterectomy Former Smoker Emergency Contacts Name Discharge Info Relation Home Work Mobile John Doing CAREGIVER [3] Daughter [21]   [phone] About your hospitalization You were admitted on:  July 21, 2017 You last received care in the:  [facility] PACU You were discharged on:  July 21, 2017 Unit phone number:  [phone] Why you were hospitalized Your primary diagnosis was:  Not on File Providers Seen During Your Hospitalizations Provider Role Specialty Primary office phone Henry Chris MD Attending Provider Cardiology [phone] Your Primary Care Physician (PCP) Primary Care Physician Office Phone Office Fax Aparna William [phone] Follow-up Information Follow up With Details Comments Contact Info Henry Chris MD On 7/28/2017 8:40 am  [address] 100[address] [facility] 
[phone] Celia Boles MD   [address] [place] [address] 
[phone] Your Appointments Friday July 28, 2017  8:40 AM EDT HOSPITAL DISCHARGE with Henry Chris MD  
[facility] ([address]) [address] [place] [address] [address] [facility]  
[phone] Current Discharge Medication List  
  
START taking these medications Dose & Instructions Dispensing Information Comments Morning Noon Evening Bedtime  
 cephALEXin 500 mg capsule Commonly known as:  Candy Renee Your last dose was: Your next dose is:    
   
   
 Dose:  500 mg Take 1 Cap by mouth three (3) times daily for 5 days. Quantity:  15 Cap Refills:  0 CONTINUE these medications which have NOT CHANGED Dose & Instructions Dispensing Information Comments Morning Noon Evening Bedtime ADULT LOW DOSE ASPIRIN 81 mg tablet Generic drug:  aspirin delayed-release Your last dose was: Your next dose is:    
   
   
 Dose:  81 mg Take 81 mg by mouth daily. Refills:  0  
     
   
   
   
  
 amLODIPine 5 mg tablet Commonly known as:  Haig Champaign Your last dose was: Your next dose is:    
   
   
 Dose:  5 mg Take 5 mg by mouth daily. Refills:  0 GLUCOSAMINE-CHONDROITIN PO Your last dose was: Your next dose is:    
   
   
 Dose:  2000 mg Take 2,000 mg by mouth. Refills:  0  
     
   
   
   
  
 hydroCHLOROthiazide 25 mg tablet Commonly known as:  HYDRODIURIL Your last dose was: Your next dose is:    
   
   
 Dose:  25 mg Take 25 mg by mouth daily. Refills:  0  
     
   
   
   
  
 levothyroxine 50 mcg tablet Commonly known as:  SYNTHROID Your last dose was: Your next dose is: Take  by mouth Daily (before breakfast). Refills:  0  
     
   
   
   
  
 metFORMIN 500 mg tablet Commonly known as:  GLUCOPHAGE Your last dose was: Your next dose is: Take  by mouth two (2) times daily (with meals). Refills:  0 METOPROLOL SUCCINATE PO Your last dose was: Your next dose is:    
   
   
 Dose:  100 mg Take 100 mg by mouth. Refills:  0 PROBIOTIC 4X 10-15 mg Tbec Generic drug:  B.infantis-B.ani-B.long-B.bifi Your last dose was: Your next dose is: Take  by mouth daily. Refills:  0 raNITIdine hcl 150 mg capsule Your last dose was: Your next dose is:    
   
   
 Dose:  150 mg Take 150 mg by mouth two (2) times a day. Refills:  0  
     
   
   
   
  
 VITAMIN D3 1,000 unit Cap Generic drug:  cholecalciferol Your last dose was: Your next dose is:    
   
   
 Dose:  1000 Units Take 1,000 Units by mouth daily. Refills:  0  
     
   
   
   
  
 XALATAN 0.005 % ophthalmic solution Generic drug:  latanoprost  
   
Your last dose was: Your next dose is:    
   
   
 Dose:  1 Drop Administer 1 Drop to both eyes nightly. Refills:  0 Where to Get Your Medications These medications were sent to [address] [facility], [address] [facility]  [address] [facility] [address], [address] [address] Phone:  [phone]  
  cephALEXin 500 mg capsule Discharge Instructions Pacemaker Generator Change Discharge Instructions Please make sure you have received your Temporary Pacemaker identification card with your discharge instructions MEDICATIONS ? Take only the medications prescribed to you at discharge. ? You are prescribed an antibiotic to take for 5 days. Please do not miss doses of this prescription. ACTIVITY ? Return to your normal activity, except as noted below. o Avoid tight clothes or unnecessary pressure over your incision (such as bra straps or seat belts). If it is tender or sensitive to clothing, cover the incision with a soft dressing or pad. 
o Questions about driving are individualized and should be discussed with one of the EP Physicians prior to discharge. SHOWERING  
  
 
? Leave the bandage over your incision for 7 days after the Pacemaker implant. You bandage will be removed in clinic in 7 days. ? It is important to keep the bandaged area clean and dry.   You may shower with the aquacel dressing in place as long as it is intact on all four sides. Do not point the direction of water directly at the site. Do not apply any lotions, powders, or perfumes to the incision line. If you have an aquacel dressing in place you may shower starting in 24 hours as long as the dressing is intact on all four sides and you do not point the flow of water directly at the site. ? Avoid submerging your incision in water (tub baths, hot tubs, or swimming) for four weeks. ? Underneath the dressing. o If you have white steri-strips over your incision (underneath the gauze dressing), they will curl up at the end and fall off, usually within 10 days. Do not pull them off. 
- OR -  
o You may have a different type of closure for the incision. If Dermabond Adhesive was used to close your incision, you will receive a separate instruction sheet. DISCHARGE PRECAUTIONS ? Record your temperature every day, at the same time, for 3 weeks after your implant. A temperature of 100.5 F, or higher, can be the first sign of infection. This should be reported to your Doctor immediately. ? Always tell your doctor or dentist that you have a Pacemaker. Antibiotics may be prescribed before certain procedures. ? If you use a cell phone, hold it on the opposite side from where your Pacemaker is implanted. ? Your temporary identification will be given to you with these instructions. Keep your Pacemaker card in your wallet or on your person at all times. You should receive your permanent card in 8 weeks. If you do not receive your permanent card, please call the office at [phone]. TAKING YOUR PULSE ? Take your pulse the same time every day, preferably in the morning. ? Sit down and rest for 5 minutes prior to taking your pulse. ? Take your pulse for 1 full minute, use a clock or stop watch with a second hand. ? To feel your pulse, use the first two fingers of one hand; place them on the thumb side of the wrist of the opposite hand. The pulse will be steady, regular and throbbing. ? Call the EP Lab Doctors if your pulse is less than 40 beats per minute. SYMPTOMS THAT NEED TO BE REPORTED IMMEDIATELY ? Temperature more than 100.4 F ? Redness or warmth at the incision site, or pain for longer than the first 5 days after the implant. ? Drainage from the incision site. ? Swelling around the incision site. ? Shortness of breath. ? Rapid heart rate or palpitations. ? Dizziness, lightheadedness, fainting. ? Slow pulse below 40 beats per minute. ? REMEMBER: If you feel something is an emergency or cannot be handled over the phone, call 911 or go to the closest emergency room. Domenic Prasad MD 
Cardiac Electrophysiology / Cardiology 9 [facility] 
[address] [facility], [address] [place] [address], [address] 
[phone] / [phone] Fax       [phone] / [phone] Fax Discharge Orders None Introducing \[facility] [place] [facility]\"" & HEALTH SERVICES! [facility] introduces [employer] patient portal. Now you can access parts of your medical record, email your doctor's office, and request medication refills online. 1. In your internet browser, go to [email or web address] 2. Click on the First Time User? Click Here link in the Sign In box. You will see the New Member Sign Up page. 3. Enter your [employer] Access Code exactly as it appears below. You will not need to use this code after youve completed the sign-up process. If you do not sign up before the expiration date, you must request a new code. · [employer] Access Code: [email or web address] Expires: 9/19/2017 10:41 AM 
 
 4. Enter the last four digits of your Social Security Number (xxxx) and Date of Birth (mm/dd/yyyy) as indicated and click Submit. You will be taken to the next sign-up page. 5. Create a [employer] ID. This will be your [employer] login ID and cannot be changed, so think of one that is secure and easy to remember. 6. Create a [employer] password. You can change your password at any time. 7. Enter your Password Reset Question and Answer. This can be used at a later time if you forget your password. 8. Enter your e-mail address. You will receive e-mail notification when new information is available in [address] 9. Click Sign Up. You can now view and download portions of your medical record. 10. Click the Download Summary menu link to download a portable copy of your medical information. If you have questions, please visit the Frequently Asked Questions section of the [employer] website. Remember, [employer] is NOT to be used for urgent needs. For medical emergencies, dial 911. Now available from your iPhone and Android! General Information Please provide this summary of care documentation to your next provider. Patient Signature:  ____________________________________________________________ Date:  ____________________________________________________________  
  
Mauro Castillo Provider Signature:  ____________________________________________________________ Date:  ____________________________________________________________

## 2017-07-21 NOTE — DISCHARGE INSTRUCTIONS
Pacemaker Generator Change  Discharge Instructions    Please make sure you have received your Temporary Pacemaker identification card with your discharge instructions      MEDICATIONS         Take only the medications prescribed to you at discharge.  You are prescribed an antibiotic to take for 5 days. Please do not miss doses of this prescription. ACTIVITY         Return to your normal activity, except as noted below. o Avoid tight clothes or unnecessary pressure over your incision (such as bra straps or seat belts). If it is tender or sensitive to clothing, cover the incision with a soft dressing or pad.  o Questions about driving are individualized and should be discussed with one of the EP Physicians prior to discharge. SHOWERING         Leave the bandage over your incision for 7 days after the Pacemaker implant. You bandage will be removed in clinic in 7 days.  It is important to keep the bandaged area clean and dry. You may shower with the aquacel dressing in place as long as it is intact on all four sides. Do not point the direction of water directly at the site. Do not apply any lotions, powders, or perfumes to the incision line. If you have an aquacel dressing in place you may shower starting in 24 hours as long as the dressing is intact on all four sides and you do not point the flow of water directly at the site.  Avoid submerging your incision in water (tub baths, hot tubs, or swimming) for four weeks.  Underneath the dressing. o If you have white steri-strips over your incision (underneath the gauze dressing), they will curl up at the end and fall off, usually within 10 days. Do not pull them off.  - OR -   o You may have a different type of closure for the incision. If Dermabond Adhesive was used to close your incision, you will receive a separate instruction sheet.       DISCHARGE PRECAUTIONS         Record your temperature every day, at the same time, for 3 weeks after your implant. A temperature of 100.5 F, or higher, can be the first sign of infection. This should be reported to your Doctor immediately.  Always tell your doctor or dentist that you have a Pacemaker. Antibiotics may be prescribed before certain procedures.  If you use a cell phone, hold it on the opposite side from where your Pacemaker is implanted.  Your temporary identification will be given to you with these instructions. Keep your Pacemaker card in your wallet or on your person at all times. You should receive your permanent card in 8 weeks. If you do not receive your permanent card, please call the office at [phone]. TAKING YOUR PULSE         Take your pulse the same time every day, preferably in the morning.  Sit down and rest for 5 minutes prior to taking your pulse.  Take your pulse for 1 full minute, use a clock or stop watch with a second hand.  To feel your pulse, use the first two fingers of one hand; place them on the thumb side of the wrist of the opposite hand. The pulse will be steady, regular and throbbing.  Call the EP Lab Doctors if your pulse is less than 40 beats per minute. SYMPTOMS THAT NEED TO BE REPORTED IMMEDIATELY         Temperature more than 100.4 F     Redness or warmth at the incision site, or pain for longer than the first 5 days after the implant.  Drainage from the incision site.  Swelling around the incision site.  Shortness of breath.  Rapid heart rate or palpitations.  Dizziness, lightheadedness, fainting.  Slow pulse below 40 beats per minute.  REMEMBER: If you feel something is an emergency or cannot be handled over the phone, call 911 or go to the closest emergency room.           Alvaro Allred MD  Cardiac Electrophysiology / Cardiology    [address] [facility], [address] [place] [address]  [address], [address] [facility] [address]  [address]         [place], [address]  [phone] / [phone] Fax       [phone] / [phone] Fax

## 2017-07-21 NOTE — H&P
HISTORY OF PRESENTING ILLNESS       Sallie Vance is a 80 y.o. female with hypertension, diabetes, dyslipidemia, bradycardia and pacemaker whose pacemaker is noted to be nearing VASHTI. She presents to discuss pacemaker generator change. She has experienced SOB/BEDOYA recently. Echocardiogram was performed last Friday but has yet to be read. Recent bloodwork was performed by her PCP but not available for review yet. Denies recent long-distance travel/immobility.          ACTIVE PROBLEM LIST           Patient Active Problem List     Diagnosis Date Noted    Mild aortic stenosis 06/22/2016    HTN (hypertension) 06/05/2013    Mobitz (type) II atrioventricular block      Pure hypercholesterolemia      Diabetes ([facility] [address]               PAST MEDICAL HISTORY           Past Medical History:   Diagnosis Date    Coronary atherosclerosis of native coronary artery       adeno card 2008: fixed inf defect, EF 47%. Echo 2008: EF 55%, mild MR.    Diabetes ([facility] [address]      HTN (hypertension)      Mild aortic stenosis      Mobitz (type) II atrioventricular block      Pure hypercholesterolemia               PAST SURGICAL HISTORY            Past Surgical History:   Procedure Laterality Date    HX PACEMAKER   2008     DDD, V lead Fx 2009. [place] PACEMAKER   2008     Gen Change    HX PACEMAKER   2009     Ventricular lead failure. No access through stenosed left subclavian.  No access from right side due to stenosis of SVC-RA junction.              ALLERGIES            Allergies   Allergen Reactions    Lisinopril Rash       Face swelling    Simvastatin Unknown (comments)       Dry lips and mouth            FAMILY HISTORY            Family History   Problem Relation Age of Onset    Hypertension Mother      negative for cardiac disease         SOCIAL HISTORY       Social History                Social History    Marital status:        Spouse name: N/A    Number of children: N/A    Years of education: N/A     Social History Main Topics     Smoking status: Former Smoker     Smokeless tobacco: Never Used     Alcohol use 0.0 oz/week       0 Standard drinks or equivalent per week         Comment: wine twice yearly     Drug use: No     Sexual activity: Not Asked            Other Topics Concern    None      Social History Narrative               MEDICATIONS           Current Outpatient Prescriptions   Medication Sig    B.infantis-B.ani-B.long-B.bifi (PROBIOTIC 4X) 10-15 mg TbEC Take  by mouth daily.  levothyroxine (SYNTHROID) 50 mcg tablet Take  by mouth Daily (before breakfast).  Cholecalciferol, Vitamin D3, (VITAMIN D3) 1,000 unit cap Take 1,000 Units by mouth daily.  GLUCOSAMINE/CHONDROITIN SULF A (GLUCOSAMINE-CHONDROITIN PO) Take 2,000 mg by mouth.  amLODIPine (NORVASC) 5 mg tablet Take 5 mg by mouth daily.  ranitidine hcl 150 mg capsule Take 150 mg by mouth two (2) times a day.  latanoprost (XALATAN) 0.005 % ophthalmic solution Administer 1 Drop to both eyes nightly.  hydrochlorothiazide (HYDRODIURIL) 25 mg tablet Take 25 mg by mouth daily.  aspirin delayed-release (ADULT LOW DOSE ASPIRIN) 81 mg tablet Take 81 mg by mouth daily.  metformin (GLUCOPHAGE) 500 mg tablet Take  by mouth two (2) times daily (with meals).  METOPROLOL SUCCINATE PO Take 100 mg by mouth.      No current facility-administered medications for this visit.          I have reviewed the nurses notes, vitals, problem list, allergy list, medical history, family, social history and medications.         REVIEW OF SYMPTOMS       General: Pt denies excessive weight gain or loss. Pt is able to conduct ADL's  HEENT: Denies blurred vision, headaches, hearing loss, epistaxis and difficulty swallowing. Respiratory: Denies cough, congestion, shortness of breath, BEDOYA, wheezing or stridor.   Cardiovascular: Denies precordial pain, palpitations, edema or PND  Gastrointestinal: Denies poor appetite, indigestion, abdominal pain or blood in stool  Genitourinary: Denies hematuria, dysuria, increased urinary frequency  Musculoskeletal: Denies joint pain or swelling from muscles or joints  Neurologic: Denies tremor, paresthesias, headache, or sensory motor disturbance  Psychiatric: Denies confusion, insomnia, depression  Integumentray: Denies rash, itching or ulcers. Hematologic: Denies easy bruising, bleeding      PHYSICAL EXAMINATION           Vitals:     07/10/17 0949   BP: 130/60   Pulse: 60   Resp: 16   SpO2: 95%   Weight: 163 lb (73.9 kg)   Height: 5' 5\" (1.651 m)      General: Well developed, in no acute distress. HEENT: No jaundice, oral mucosa moist, no oral ulcers  Neck: Supple, no stiffness, no lymphadenopathy, supple  Heart:  Normal S1/S2 negative S3 or S4. Regular, no murmur, gallop or rub, no jugular venous distention  Respiratory: Clear bilaterally x 4, no wheezing or rales  Abdomen:   Soft, non-tender, bowel sounds are active.   Extremities:  No edema, normal cap refill, no cyanosis. Musculoskeletal: No clubbing, no deformities  Neuro: A&Ox3, speech clear, gait stable, cooperative, no focal neurologic deficits  Skin: Skin color is normal. No rashes or lesions.  Non diaphoretic, moist.  Vascular: 2+ pulses symmetric in all extremities         DIAGNOST IC DATA      EKG: Sinus rhythm with ventricular pacing on 6/28/17         LABORATORY DATA             Lab Results   Component Value Date/Time     WBC 6.3 11/17/2016 11:36 AM     HGB 10.4 11/17/2016 11:36 AM     HCT 31.5 11/17/2016 11:36 AM     PLATELET 314 66/10/3100 11:36 AM     MCV 68 11/17/2016 11:36 AM            Lab Results   Component Value Date/Time     Sodium 132 06/07/2009 03:00 AM     Potassium 4.0 06/07/2009 03:00 AM     Chloride 97 06/07/2009 03:00 AM     CO2 26 06/07/2009 03:00 AM     Anion gap 9 06/07/2009 03:00 AM     Glucose 133 06/07/2009 03:00 AM     BUN 25 06/07/2009 03:00 AM     Creatinine 1.1 06/07/2009 03:00 AM     BUN/Creatinine ratio 23 06/07/2009 03:00 AM   GFR est AA >60 06/07/2009 03:00 AM     GFR est non-AA 51 06/07/2009 03:00 AM     Calcium 8.7 06/07/2009 03:00 AM     Bilirubin, total 0.6 06/02/2009 04:00 PM     AST (SGOT) 17 06/02/2009 04:00 PM     Alk. phosphatase 76 06/02/2009 04:00 PM     Protein, total 7.5 06/02/2009 04:00 PM     Albumin 4.2 06/02/2009 04:00 PM     Globulin 3.3 06/02/2009 04:00 PM     A-G Ratio 1.3 06/02/2009 04:00 PM     ALT (SGPT) 25 06/02/2009 04:00 PM             ASSESSMENT       1. Pacemaker  2. Diabetes  3. Hypertension  4. Dyslipidemia  5. Bradycardia         PLAN      Obtain copy of recent bloodwork. Obtain chest x-ray to further evaluate etiology of recent SOB. Follow up echocardiogram results from 3 days ago (not read yet). Plan for pacemaker generator change (unless EF has changed).        FOLLOW-UP      1 week post procedure        Thank you,  Leyla Roger MD and Dr. Darling Franks for involving me in the care of this extraordinarily pleasant female. Please do not hesitate to contact me for further questions/concerns.            Ekaterina Jurado MD  Cardiac Electrophysiology / Cardiology     [address], [address], [address] [place] [address]  [phone] / [phone] Fax                                                                  [phone] / [phone] Fax              No change in H/P since 7/10/17. Proceed as planned with generator change.      Ekaterina Jurado MD

## 2017-07-21 NOTE — PROGRESS NOTES
Patient arrived. ID and allergies verified verbally with patient. Pt voices understanding of procedure to be performed. Consent obtained. Pt prepped for procedure. 0930 TRANSFER - IN REPORT:    Verbal report received from Callum(name) on Janet Cortes  being received from ep(unit) for routine progression of care      Report consisted of patients Situation, Background, Assessment and   Recommendations(SBAR). Information from the following report(s) Procedure Summary was reviewed with the receiving nurse. Opportunity for questions and clarification was provided. Assessment completed upon patients arrival to unit and care assumed. Ice pack applied L chest.        1050 Daughter at bedside. Pt setup to eat. Discharge instructions reviewed with patient and family. Voiced understanding. Patient given copy of discharge instructions to take home. 200 Pt discharged via wheelchair with tech to daughters care. Personal belongings with patient upon discharge.

## 2017-07-31 ENCOUNTER — OFFICE VISIT (OUTPATIENT)
Dept: CARDIOLOGY CLINIC | Age: 82
End: 2017-07-31

## 2017-07-31 VITALS
DIASTOLIC BLOOD PRESSURE: 60 MMHG | RESPIRATION RATE: 16 BRPM | SYSTOLIC BLOOD PRESSURE: 120 MMHG | OXYGEN SATURATION: 98 % | BODY MASS INDEX: 27.16 KG/M2 | HEIGHT: 65 IN | WEIGHT: 163 LBS | HEART RATE: 68 BPM

## 2017-07-31 DIAGNOSIS — Z95.0 PACEMAKER: ICD-10-CM

## 2017-07-31 DIAGNOSIS — Z51.89 VISIT FOR WOUND CHECK: ICD-10-CM

## 2017-07-31 DIAGNOSIS — I44.1 MOBITZ (TYPE) II ATRIOVENTRICULAR BLOCK: Primary | ICD-10-CM

## 2017-07-31 NOTE — PROGRESS NOTES
Patient presents for wound check post-device implantation. The dressing was removed and the site was inspected. The site appeared to be well-healing without ecchymosis/tenderness/erythema. Denies pain, fevers, discharge. Plan:    Steri strips applied to lateral portion of incision for reinforcement after dressing removal.   Continue follow up in device clinic as planned.        Vish Sung NP

## 2017-09-06 ENCOUNTER — CLINICAL SUPPORT (OUTPATIENT)
Dept: CARDIOLOGY CLINIC | Age: 82
End: 2017-09-06

## 2017-09-06 ENCOUNTER — OFFICE VISIT (OUTPATIENT)
Dept: CARDIOLOGY CLINIC | Age: 82
End: 2017-09-06

## 2017-09-06 VITALS
WEIGHT: 152 LBS | OXYGEN SATURATION: 96 % | DIASTOLIC BLOOD PRESSURE: 83 MMHG | HEART RATE: 72 BPM | SYSTOLIC BLOOD PRESSURE: 138 MMHG | HEIGHT: 65 IN | RESPIRATION RATE: 16 BRPM | BODY MASS INDEX: 25.33 KG/M2

## 2017-09-06 DIAGNOSIS — I10 ESSENTIAL HYPERTENSION: ICD-10-CM

## 2017-09-06 DIAGNOSIS — E78.00 PURE HYPERCHOLESTEROLEMIA: ICD-10-CM

## 2017-09-06 DIAGNOSIS — Z95.0 CARDIAC PACEMAKER IN SITU: Primary | ICD-10-CM

## 2017-09-06 DIAGNOSIS — E11.9 TYPE 2 DIABETES MELLITUS WITHOUT COMPLICATION, WITHOUT LONG-TERM CURRENT USE OF INSULIN (HCC): ICD-10-CM

## 2017-09-06 DIAGNOSIS — I44.1 MOBITZ (TYPE) II ATRIOVENTRICULAR BLOCK: ICD-10-CM

## 2017-09-06 RX ORDER — METOPROLOL SUCCINATE 100 MG/1
TABLET, EXTENDED RELEASE ORAL DAILY
COMMUNITY

## 2017-09-06 NOTE — PROGRESS NOTES
HISTORY OF PRESENTING ILLNESS      Jose Eduardo Teresa is a 80 y.o. female with hypertension, diabetes, dyslipidemia, bradycardia and pacemaker who underwent pacemaker generator change and now presents for follow-up. Device interrogation reveals normal device functioning, she is dependent. Her incision site has healed well. She denies fevers, chills, pain. She is feeling well and reports improved energy and shortness of breath since her upgrade. ACTIVE PROBLEM LIST     Patient Active Problem List    Diagnosis Date Noted    Mild aortic stenosis 06/22/2016    HTN (hypertension) 06/05/2013    Mobitz (type) II atrioventricular block     Pure hypercholesterolemia     Diabetes ([facility] [address]            PAST MEDICAL HISTORY     Past Medical History:   Diagnosis Date    Coronary atherosclerosis of native coronary artery     adeno card 2008: fixed inf defect, EF 47%. Echo 2008: EF 55%, mild MR.    Diabetes ([address]     HTN (hypertension)     Mild aortic stenosis     Mobitz (type) II atrioventricular block     Pure hypercholesterolemia            PAST SURGICAL HISTORY     Past Surgical History:   Procedure Laterality Date    HX PACEMAKER  2008    DDD, V lead Fx 2009.  HX PACEMAKER  2008    Gen Change    HX PACEMAKER  2009    Ventricular lead failure. No access through stenosed left subclavian. No access from right side due to stenosis of SVC-RA junction.            ALLERGIES     Allergies   Allergen Reactions    Lisinopril Rash     Face swelling    Simvastatin Unknown (comments)     Dry lips and mouth          FAMILY HISTORY     Family History   Problem Relation Age of Onset    Hypertension Mother     negative for cardiac disease       SOCIAL HISTORY     Social History     Social History    Marital status:      Spouse name: N/A    Number of children: N/A    Years of education: N/A     Social History Main Topics    Smoking status: Former Smoker    Smokeless tobacco: Never Used    Alcohol use 0.0 oz/week     0 Standard drinks or equivalent per week      Comment: wine twice yearly    Drug use: No    Sexual activity: Not on file     Other Topics Concern    Not on file     Social History Narrative         MEDICATIONS     Current Outpatient Prescriptions   Medication Sig    B.infantis-B.ani-B.long-B.bifi (PROBIOTIC 4X) 10-15 mg TbEC Take  by mouth daily.  levothyroxine (SYNTHROID) 50 mcg tablet Take  by mouth Daily (before breakfast).  Cholecalciferol, Vitamin D3, (VITAMIN D3) 1,000 unit cap Take 1,000 Units by mouth daily.  GLUCOSAMINE/CHONDROITIN SULF A (GLUCOSAMINE-CHONDROITIN PO) Take 2,000 mg by mouth.  amLODIPine (NORVASC) 5 mg tablet Take 5 mg by mouth daily.  ranitidine hcl 150 mg capsule Take 150 mg by mouth two (2) times a day.  latanoprost (XALATAN) 0.005 % ophthalmic solution Administer 1 Drop to both eyes nightly.  hydrochlorothiazide (HYDRODIURIL) 25 mg tablet Take 25 mg by mouth daily.  aspirin delayed-release (ADULT LOW DOSE ASPIRIN) 81 mg tablet Take 81 mg by mouth daily.  metformin (GLUCOPHAGE) 500 mg tablet Take  by mouth two (2) times daily (with meals).  METOPROLOL SUCCINATE PO Take 100 mg by mouth. No current facility-administered medications for this visit. I have reviewed the nurses notes, vitals, problem list, allergy list, medical history, family, social history and medications. REVIEW OF SYMPTOMS      General: Pt denies excessive weight gain or loss. Pt is able to conduct ADL's  HEENT: Denies blurred vision, headaches, hearing loss, epistaxis and difficulty swallowing. Respiratory: Denies cough, congestion, shortness of breath, BEDOYA, wheezing or stridor.   Cardiovascular: Denies precordial pain, palpitations, edema or PND  Gastrointestinal: Denies poor appetite, indigestion, abdominal pain or blood in stool  Genitourinary: Denies hematuria, dysuria, increased urinary frequency  Musculoskeletal: Denies joint pain or swelling from muscles or joints  Neurologic: Denies tremor, paresthesias, headache, or sensory motor disturbance  Psychiatric: Denies confusion, insomnia, depression  Integumentray: Denies rash, itching or ulcers. Hematologic: Denies easy bruising, bleeding     PHYSICAL EXAMINATION      There were no vitals filed for this visit. General: Well developed, in no acute distress. HEENT: No jaundice, oral mucosa moist, no oral ulcers  Neck: Supple, no stiffness, no lymphadenopathy, supple  Heart:  Normal S1/S2 negative S3 or S4. Regular, no murmur, gallop or rub, no jugular venous distention  Respiratory: Clear bilaterally x 4, no wheezing or rales  Abdomen:   Soft, non-tender, bowel sounds are active.   Extremities:  No edema, normal cap refill, no cyanosis. Musculoskeletal: No clubbing, no deformities  Neuro: A&Ox3, speech clear, gait stable, cooperative, no focal neurologic deficits  Skin: Skin color is normal. No rashes or lesions. Non diaphoretic, moist.  Vascular: 2+ pulses symmetric in all extremities       DIAGNOSTIC DATA      EKG:        LABORATORY DATA      Lab Results   Component Value Date/Time    WBC 5.3 07/10/2017 10:54 AM    HGB 11.2 07/10/2017 10:54 AM    HCT 34.0 07/10/2017 10:54 AM    PLATELET 137 27/91/9726 10:54 AM    MCV 73 07/10/2017 10:54 AM      Lab Results   Component Value Date/Time    Sodium 141 07/10/2017 10:54 AM    Potassium 4.3 07/10/2017 10:54 AM    Chloride 102 07/10/2017 10:54 AM    CO2 20 07/10/2017 10:54 AM    Anion gap 9 06/07/2009 03:00 AM    Glucose 193 07/10/2017 10:54 AM    BUN 30 07/10/2017 10:54 AM    Creatinine 1.40 07/10/2017 10:54 AM    BUN/Creatinine ratio 21 07/10/2017 10:54 AM    GFR est AA 39 07/10/2017 10:54 AM    GFR est non-AA 34 07/10/2017 10:54 AM    Calcium 9.6 07/10/2017 10:54 AM    Bilirubin, total 0.6 06/02/2009 04:00 PM    AST (SGOT) 17 06/02/2009 04:00 PM    Alk.  phosphatase 76 06/02/2009 04:00 PM    Protein, total 7.5 06/02/2009 04:00 PM    Albumin 4.2 06/02/2009 04:00 PM Globulin 3.3 06/02/2009 04:00 PM    A-G Ratio 1.3 06/02/2009 04:00 PM    ALT (SGPT) 25 06/02/2009 04:00 PM           ASSESSMENT      1.  Pacemaker  2.  Diabetes  3.  Hypertension  4.  Dyslipidemia  5.  Bradycardia       PLAN     Continue per device clinic and continue current medical therapy. FOLLOW-UP     1 year    Thank you,  Cory Bean MD and Dr. Romaine Lindsey for involving me in the care of this extraordinarily pleasant female. Please do not hesitate to contact me for further questions/concerns.      Ethel Hopson NP      [address], [address] [facility], [address]  [place]rasco     Giuliana Nielson  [phone] / [phone] Fax   [phone] / [phone] Fax

## 2017-12-06 ENCOUNTER — OFFICE VISIT (OUTPATIENT)
Dept: CARDIOLOGY CLINIC | Age: 82
End: 2017-12-06

## 2017-12-06 VITALS
WEIGHT: 161 LBS | HEART RATE: 84 BPM | HEIGHT: 65 IN | DIASTOLIC BLOOD PRESSURE: 80 MMHG | SYSTOLIC BLOOD PRESSURE: 128 MMHG | BODY MASS INDEX: 26.82 KG/M2 | OXYGEN SATURATION: 100 %

## 2017-12-06 DIAGNOSIS — I10 ESSENTIAL HYPERTENSION: ICD-10-CM

## 2017-12-06 DIAGNOSIS — I35.0 MILD AORTIC STENOSIS: Primary | ICD-10-CM

## 2017-12-06 NOTE — PROGRESS NOTES
Yaneth Seaman MD. [facility]              Patient: Vince Masters  : 1931      Today's Date: 2017              HISTORY OF PRESENT ILLNESS:     History of Present Illness:  Ms. Denise Vuong is here for follow-up. She is doing OK overall. No complaints. PAST MEDICAL HISTORY:     Past Medical History:   Diagnosis Date    Coronary atherosclerosis of native coronary artery     adeno card : fixed inf defect, EF 47%. Echo : EF 55%, mild MR.    Diabetes ([address]     HTN (hypertension)     Mild aortic stenosis     Mobitz (type) II atrioventricular block     Pure hypercholesterolemia          Past Surgical History:   Procedure Laterality Date    HX PACEMAKER      DDD, V lead Fx .  HX PACEMAKER      Gen Change    HX PACEMAKER      Ventricular lead failure. No access through stenosed left subclavian. No access from right side due to stenosis of SVC-RA junction. MEDICATIONS:     Current Outpatient Prescriptions   Medication Sig Dispense Refill    metoprolol succinate (TOPROL XL) 100 mg tablet Take  by mouth daily.  B.infantis-B.ani-B.long-B.bifi (PROBIOTIC 4X) 10-15 mg TbEC Take  by mouth daily.  levothyroxine (SYNTHROID) 50 mcg tablet Take  by mouth Daily (before breakfast).  Cholecalciferol, Vitamin D3, (VITAMIN D3) 1,000 unit cap Take 1,000 Units by mouth daily.  GLUCOSAMINE/CHONDROITIN SULF A (GLUCOSAMINE-CHONDROITIN PO) Take 2,000 mg by mouth.  amLODIPine (NORVASC) 5 mg tablet Take 5 mg by mouth daily.  ranitidine hcl 150 mg capsule Take 150 mg by mouth two (2) times a day.  latanoprost (XALATAN) 0.005 % ophthalmic solution Administer 1 Drop to both eyes nightly.  hydrochlorothiazide (HYDRODIURIL) 25 mg tablet Take 25 mg by mouth daily.  aspirin delayed-release (ADULT LOW DOSE ASPIRIN) 81 mg tablet Take 81 mg by mouth daily.       metformin (GLUCOPHAGE) 500 mg tablet Take  by mouth two (2) times daily (with meals). Allergies   Allergen Reactions    Lisinopril Rash     Face swelling    Simvastatin Unknown (comments)     Dry lips and mouth             SOCIAL HISTORY:     Social History   Substance Use Topics    Smoking status: Former Smoker    Smokeless tobacco: Never Used    Alcohol use 0.0 oz/week     0 Standard drinks or equivalent per week      Comment: wine twice yearly           FAMILY HISTORY:     Family History   Problem Relation Age of Onset    Hypertension Mother                REVIEW OF SYSTEMS:        Review of Systems:    Constitutional: Negative for fever, chills    HEENT: Negative for vision changes.    Respiratory: Negative for cough    Cardiovascular: Negative for orthopnea, syncope, and PND.    Gastrointestinal: Negative for abdominal pain, diarrhea, or melena    Genitourinary: Negative for dysuria    Musculoskeletal: Negative for myalgias.    Skin: Negative for rash    Heme: No problems bleeding.    Neurological: Negative for speech change and focal weakness.    + chronic constipation                   PHYSICAL EXAM:        Physical Exam:   Visit Vitals    /80    Pulse 84    Ht 5' 5\" (1.651 m)    Wt 161 lb (73 kg)    SpO2 100%    BMI 26.79 kg/m2       Patient appears generally well, mood and affect are appropriate and pleasant.    HEENT: Normocephalic, atraumatic.  Hearing intact. Sclera anicteric.    Neck Exam: Supple, No JVD sitting up, + bilat neck bruits  Lung Exam: Clear to auscultation, even breath sounds.    Cardiac Exam: Regular rate and rhythm with 2/6 systolic mumur.    Abdomen: Soft, non-tender, normal bowel sounds.    Extremities: No lower extremity edema.  MAW  Psych - appropriate affect   Vasc - 2+ DP's  Neuro - non focal                       LABS / OTHER STUDIES:        Labs 2/23/15 - chol 172, HDL 44, , , CMP OK, TSH 1.2  Labs 1/1/116 - CMP OK, chol 198, HDL 51, , , TSH 1.2, A1c 7.3,   Labs 8/22/16 - CMP OK, chol 182, , HDL 47, , TSH 1, A1c 6.5  Labs 2/16 - chol 182, HDL 47, ,   Labs 2/17 - CMP Ok (Cr 1.17, glc 113), A1c 6.6     Lab Results   Component Value Date/Time    Sodium 141 07/10/2017 10:54 AM    Potassium 4.3 07/10/2017 10:54 AM    Chloride 102 07/10/2017 10:54 AM    CO2 20 07/10/2017 10:54 AM    Anion gap 9 06/07/2009 03:00 AM    Glucose 193 07/10/2017 10:54 AM    BUN 30 07/10/2017 10:54 AM    Creatinine 1.40 07/10/2017 10:54 AM    BUN/Creatinine ratio 21 07/10/2017 10:54 AM    GFR est AA 39 07/10/2017 10:54 AM    GFR est non-AA 34 07/10/2017 10:54 AM    Calcium 9.6 07/10/2017 10:54 AM    Bilirubin, total 0.6 06/02/2009 04:00 PM    AST (SGOT) 17 06/02/2009 04:00 PM    Alk. phosphatase 76 06/02/2009 04:00 PM    Protein, total 7.5 06/02/2009 04:00 PM    Albumin 4.2 06/02/2009 04:00 PM    Globulin 3.3 06/02/2009 04:00 PM    A-G Ratio 1.3 06/02/2009 04:00 PM    ALT (SGPT) 25 06/02/2009 04:00 PM       Lab Results   Component Value Date/Time    WBC 5.3 07/10/2017 10:54 AM    HGB 11.2 07/10/2017 10:54 AM    HCT 34.0 07/10/2017 10:54 AM    PLATELET 546 51/15/7639 10:54 AM    MCV 73 07/10/2017 10:54 AM                CARDIAC DIAGNOSTICS:        Cardiac Evaluation Includes:  Carotid dopplers 6/23/14 - 10-49% stenosis bilat  Echo 6/3/15 - TDS - mod LVH, LVEF 55%, mod MAC and mild MR, mild AS (mean gradient 14), mild LAE  Lexiscan Cardiolite 12/1/16 - + breast attenuation, but no ischemia. LVEF 51%.  Low risk findings. Echo 7/7/17 - TDS. Mod LVH. LVEF 55%. Mild-mod AS (mean gradient 19. LAST 1.4). Mild LAE.   RVSP 40      EKG 6/3/15 - A sensed, V paced  EKG 6/22/16 - V paced   EKG 6/21/17 - V paced                   ASSESSMENT AND PLAN:        Assessment and Plan:    1) HTN    - BP looks good - continue current regimen       2) Carotid Artery disease - mild    - Carotid dopplers 6/23/14 - 10-49% stenosis bilat      3) Borderline Dyslipidemia    - A statin is reasonable given her DM and mild carotid disease.  I reviewed my recommendation for a statin.     - Ms. Teresa though says she does not want to take any statins due to potential side effects.       4) History of high grade Av block s/p pacer    - denies cardiac complaints    - Continue pacer checks as usual       5) Mild-mod AS   - recheck echo in 1-2 years       6) RTC in 6 months.  Patient expressed understanding of the plan - questions were answered.      Lives with her daughter. Son in [place] Marya Bobby Wilcox MD, [address] Roya Blanchard [facility], [address]                [id number] [facility]  [address], [address] [place] [address], [address] [place] [address]  Ph: [phone]                                                             [phone]  Emanuel Kidney    ADDENDUM   3/5/2018  Labs 2/13/18 - Cr 1.36, K 4.2, TSH 1.07, A1c 6.0

## 2017-12-06 NOTE — PATIENT INSTRUCTIONS

## 2017-12-06 NOTE — MR AVS SNAPSHOT
Visit Information Date & Time Provider Department Dept. Phone Encounter #  
 12/6/2017 10:00 AM Dodie Babcock MD [facility] [phone] [id number] Your Appointments 9/28/2018  9:30 AM  
PACEMAKER with PACEMAKER, YOSHI  
[facility] ([facility]SE SCHEDULING) Appt Note: med thresholds/stf/new carelink schedule [address] [address] [facility]  
[phone]  
  
   
 [address] [facility] [address]  
  
    
 9/28/2018 10:00 AM  
ESTABLISHED PATIENT with Donald Bush MD  
[facility] ([facility]) Appt Note: annual  ck w/ ck up [address] [address] [facility]  
[phone]  
  
   
 [address] [facility] [id number] [place] [address] [address]  
  
    
  
 12/12/2017 11:00 AM  
REMOTE OFFICE VISIT with Merced Hammond [facility] ([facility] SCHEDULING) Appt Note: carelink ppi/stf  
 [address] [facility] [address]  
[phone]  
  
   
 [address] [place] [address]  
  
    
 3/20/2018  9:45 AM  
REMOTE OFFICE VISIT with valeriano Manish [facility] ([facility] SCHEDULING) Appt Note: carelink ppi./stf  
 [address] [facility]  
[phone]  
  
    
 6/26/2018  9:15 AM  
REMOTE OFFICE VISIT with [facility] ([facility] SCHEDULING) Appt Note: carelink ppi/stf  
 [address] [facility]  
[phone] Upcoming Health Maintenance Date Due  
 FOOT EXAM Q1 7/8/1941 MICROALBUMIN Q1 7/8/1941 EYE EXAM RETINAL OR DILATED Q1 7/8/1941 DTaP/Tdap/Td series (1 - Tdap) 7/8/1952 ZOSTER VACCINE AGE 60> 5/8/1991 GLAUCOMA SCREENING Q2Y 7/8/1996 OSTEOPOROSIS SCREENING (DEXA) 7/8/1996 Pneumococcal 65+ Low/Medium Risk (1 of 2 - PCV13) 7/8/1996 MEDICARE YEARLY EXAM 7/8/1996 HEMOGLOBIN A1C Q6M 8/19/2016 LIPID PANEL Q1 2/19/2017 Influenza Age 5 to Adult 8/1/2017 Allergies as of 12/6/2017  Review Complete On: 12/6/2017 By: Teressa Parada MD  
  
 Severity Noted Reaction Type Reactions Lisinopril  09/23/2010    Rash Face swelling Simvastatin  09/23/2010    Unknown (comments) Dry lips and mouth Current Immunizations  Never Reviewed No immunizations on file. Not reviewed this visit You Were Diagnosed With   
  
 Codes Comments Mild aortic stenosis    -  Primary ICD-10-CM: I35.0 ICD-9-CM: 424.1 Essential hypertension     ICD-10-CM: I10 
ICD-9-CM: 401.9 Vitals BP Pulse Height(growth percentile) Weight(growth percentile) SpO2 BMI  
 128/80 84 5' 5\" (1.651 m) 161 lb (73 kg) 100% 26.79 kg/m2 OB Status Smoking Status Hysterectomy Former Smoker Vitals History BMI and BSA Data Body Mass Index Body Surface Area  
 26.79 kg/m 2 1.83 m 2 Preferred Pharmacy Pharmacy Name Phone [facility], [address] [phone] Your Updated Medication List  
  
   
This list is accurate as of: 12/6/17 10:32 AM.  Always use your most recent med list.  
  
  
  
  
 ADULT LOW DOSE ASPIRIN 81 mg tablet Generic drug:  aspirin delayed-release Take 81 mg by mouth daily. amLODIPine 5 mg tablet Commonly known as:  Tony Alstrom Take 5 mg by mouth daily. GLUCOSAMINE-CHONDROITIN PO Take 2,000 mg by mouth. hydroCHLOROthiazide 25 mg tablet Commonly known as:  HYDRODIURIL Take 25 mg by mouth daily. levothyroxine 50 mcg tablet Commonly known as:  SYNTHROID Take  by mouth Daily (before breakfast). metFORMIN 500 mg tablet Commonly known as:  GLUCOPHAGE Take  by mouth two (2) times daily (with meals). PROBIOTIC 4X 10-15 mg Tbec Generic drug:  B.infantis-B.ani-B.long-B.bifi Take  by mouth daily. raNITIdine hcl 150 mg capsule Take 150 mg by mouth two (2) times a day. TOPROL  mg tablet Generic drug:  metoprolol succinate Take  by mouth daily. VITAMIN D3 1,000 unit Cap Generic drug:  cholecalciferol Take 1,000 Units by mouth daily. XALATAN 0.005 % ophthalmic solution Generic drug:  latanoprost  
Administer 1 Drop to both eyes nightly. Patient Instructions Heart-Healthy Diet: Care Instructions Your Care Instructions A heart-healthy diet has lots of vegetables, fruits, nuts, beans, and whole grains, and is low in salt. It limits foods that are high in saturated fat, such as meats, cheeses, and fried foods. It may be hard to change your diet, but even small changes can lower your risk of heart attack and heart disease. Follow-up care is a key part of your treatment and safety. Be sure to make and go to all appointments, and call your doctor if you are having problems. It's also a good idea to know your test results and keep a list of the medicines you take. How can you care for yourself at home? Watch your portions · Learn what a serving is. A \"serving\" and a \"portion\" are not always the same thing. Make sure that you are not eating larger portions than are recommended. For example, a serving of pasta is ½ cup. A serving size of meat is 2 to 3 ounces. A 3-ounce serving is about the size of a deck of cards. Measure serving sizes until you are good at [place]" them. Keep in mind that restaurants often serve portions that are 2 or 3 times the size of one serving. · To keep your energy level up and keep you from feeling hungry, eat often but in smaller portions. · Eat only the number of calories you need to stay at a healthy weight. If you need to lose weight, eat fewer calories than your body burns (through exercise and other physical activity). Eat more fruits and vegetables · Eat a variety of fruit and vegetables every day. Dark green, deep orange, red, or yellow fruits and vegetables are especially good for you. Examples include spinach, carrots, peaches, and berries. · Keep carrots, celery, and other veggies handy for snacks. Buy fruit that is in season and store it where you can see it so that you will be tempted to eat it. · Cook dishes that have a lot of veggies in them, such as stir-fries and soups. Limit saturated and trans fat · Read food labels, and try to avoid saturated and trans fats. They increase your risk of heart disease. Trans fat is found in many processed foods such as cookies and crackers. · Use olive or canola oil when you cook. Try cholesterol-lowering spreads, such as Benecol or Take Control. · Bake, broil, grill, or steam foods instead of frying them. · Choose lean meats instead of high-fat meats such as hot dogs and sausages. Cut off all visible fat when you prepare meat. · Eat fish, skinless poultry, and meat alternatives such as soy products instead of high-fat meats. Soy products, such as tofu, may be especially good for your heart. · Choose low-fat or fat-free milk and dairy products. Eat fish · Eat at least two servings of fish a week. Certain fish, such as salmon and tuna, contain omega-3 fatty acids, which may help reduce your risk of heart attack. Eat foods high in fiber · Eat a variety of grain products every day. Include whole-grain foods that have lots of fiber and nutrients. Examples of whole-grain foods include oats, whole wheat bread, and brown rice. · Buy whole-grain breads and cereals, instead of white bread or pastries. Limit salt and sodium · Limit how much salt and sodium you eat to help lower your blood pressure. · Taste food before you salt it. Add only a little salt when you think you need it. With time, your taste buds will adjust to less salt. · Eat fewer snack items, fast foods, and other high-salt, processed foods. Check food labels for the amount of sodium in packaged foods. · Choose low-sodium versions of canned goods (such as soups, vegetables, and beans). Limit sugar · Limit drinks and foods with added sugar. These include candy, desserts, and soda pop. Limit alcohol · Limit alcohol to no more than 2 drinks a day for men and 1 drink a day for women. Too much alcohol can cause health problems. When should you call for help? Watch closely for changes in your health, and be sure to contact your doctor if: 
? · You would like help planning heart-healthy meals. Where can you learn more? Go to http://yevgeniy-júnior.info/. Enter V137 in the search box to learn more about \"Heart-Healthy Diet: Care Instructions. \" Current as of: September 21, 2016 Content Version: 11.4 © 3051-4643 [employer]. Care instructions adapted under license by [employer] (which disclaims liability or warranty for this information). If you have questions about a medical condition or this instruction, always ask your healthcare professional. [place] [address] any warranty or liability for your use of this information. Introducing [facility] [place] [facility]! [facility] introduces Clicker patient portal. Now you can access parts of your medical record, email your doctor's office, and request medication refills online. 1. In your internet browser, go to [email or web address] 2. Click on the First Time User? Click Here link in the Sign In box. You will see the New Member Sign Up page. 3. Enter your Clicker Access Code exactly as it appears below. You will not need to use this code after youve completed the sign-up process. If you do not sign up before the expiration date, you must request a new code. · Clicker Access Code: [email or web address] Expires: 3/6/2018 10:31 AM 
 
 4. Enter the last four digits of your Social Security Number (xxxx) and Date of Birth (mm/dd/yyyy) as indicated and click Submit. You will be taken to the next sign-up page. 5. Create a SciAps ID. This will be your [employer] login ID and cannot be changed, so think of one that is secure and easy to remember. 6. Create a [employer] password. You can change your password at any time. 7. Enter your Password Reset Question and Answer. This can be used at a later time if you forget your password. 8. Enter your e-mail address. You will receive e-mail notification when new information is available in [address] 9. Click Sign Up. You can now view and download portions of your medical record. 10. Click the Download Summary menu link to download a portable copy of your medical information. If you have questions, please visit the Frequently Asked Questions section of the [employer] website. Remember, [employer]ps is NOT to be used for urgent needs. For medical emergencies, dial 911. Now available from your iPhone and Android! Please provide this summary of care documentation to your next provider. Your primary care clinician is listed as Akira Rosenbaum. If you have any questions after today's visit, please call [phone].

## 2017-12-12 ENCOUNTER — OFFICE VISIT (OUTPATIENT)
Dept: CARDIOLOGY CLINIC | Age: 82
End: 2017-12-12

## 2017-12-12 DIAGNOSIS — Z95.0 CARDIAC PACEMAKER IN SITU: Primary | ICD-10-CM

## 2018-03-20 ENCOUNTER — OFFICE VISIT (OUTPATIENT)
Dept: CARDIOLOGY CLINIC | Age: 83
End: 2018-03-20

## 2018-03-20 DIAGNOSIS — Z95.0 CARDIAC PACEMAKER IN SITU: Primary | ICD-10-CM

## 2018-06-13 ENCOUNTER — OFFICE VISIT (OUTPATIENT)
Dept: CARDIOLOGY CLINIC | Age: 83
End: 2018-06-13

## 2018-06-13 VITALS
BODY MASS INDEX: 26.98 KG/M2 | HEIGHT: 64 IN | SYSTOLIC BLOOD PRESSURE: 122 MMHG | HEART RATE: 64 BPM | DIASTOLIC BLOOD PRESSURE: 74 MMHG | WEIGHT: 158 LBS

## 2018-06-13 DIAGNOSIS — I10 ESSENTIAL HYPERTENSION: ICD-10-CM

## 2018-06-13 DIAGNOSIS — I35.0 MILD AORTIC STENOSIS: Primary | ICD-10-CM

## 2018-06-13 NOTE — PROGRESS NOTES
Sherin Duarte MD. [facility] [place]              Patient: Janet Cortes  : 1931      Today's Date: 2018            HISTORY OF PRESENT ILLNESS:     History of Present Illness:  Here for follow-up. Doing well. No CP or SOB. No dizziness. PAST MEDICAL HISTORY:     Past Medical History:   Diagnosis Date    Coronary atherosclerosis of native coronary artery     adeno card : fixed inf defect, EF 47%. Echo : EF 55%, mild MR.    Diabetes ([address]     HTN (hypertension)     Mild aortic stenosis     Mobitz (type) II atrioventricular block     Pure hypercholesterolemia          Past Surgical History:   Procedure Laterality Date    HX PACEMAKER      DDD, V lead Fx .  HX PACEMAKER      Gen Change    HX PACEMAKER      Ventricular lead failure. No access through stenosed left subclavian. No access from right side due to stenosis of SVC-RA junction. MEDICATIONS:     Current Outpatient Prescriptions   Medication Sig Dispense Refill    metoprolol succinate (TOPROL XL) 100 mg tablet Take  by mouth daily.  B.infantis-B.ani-B.long-B.bifi (PROBIOTIC 4X) 10-15 mg TbEC Take  by mouth daily.  levothyroxine (SYNTHROID) 50 mcg tablet Take  by mouth Daily (before breakfast).  Cholecalciferol, Vitamin D3, (VITAMIN D3) 1,000 unit cap Take 1,000 Units by mouth daily.  GLUCOSAMINE/CHONDROITIN SULF A (GLUCOSAMINE-CHONDROITIN PO) Take 2,000 mg by mouth.  amLODIPine (NORVASC) 5 mg tablet Take 5 mg by mouth daily.  ranitidine hcl 150 mg capsule Take 150 mg by mouth two (2) times a day.  aspirin delayed-release (ADULT LOW DOSE ASPIRIN) 81 mg tablet Take 81 mg by mouth daily.  metformin (GLUCOPHAGE) 500 mg tablet Take  by mouth two (2) times daily (with meals).          Allergies   Allergen Reactions    Lisinopril Rash     Face swelling    Simvastatin Unknown (comments)     Dry lips and mouth             SOCIAL HISTORY:     Social History Substance Use Topics    Smoking status: Former Smoker    Smokeless tobacco: Never Used    Alcohol use 0.0 oz/week     0 Standard drinks or equivalent per week      Comment: wine twice yearly         FAMILY HISTORY:     Family History   Problem Relation Age of Onset    Hypertension Mother              REVIEW OF SYSTEMS:        Review of Systems:    Constitutional: Negative for fever, chills    HEENT: Negative for vision changes.    Respiratory: Negative for cough    Cardiovascular: Negative for orthopnea, syncope, and PND.    Gastrointestinal: Negative for abdominal pain, diarrhea, or melena    Genitourinary: Negative for dysuria    Musculoskeletal: Negative for myalgias.    Skin: Negative for rash    Heme: No problems bleeding.    Neurological: Negative for speech change and focal weakness.    + chronic constipation                   PHYSICAL EXAM:        Physical Exam:   Visit Vitals    /74    Pulse 64    Ht 5' 4\" (1.626 m)    Wt 158 lb (71.7 kg)    BMI 27.12 kg/m2          Patient appears generally well, mood and affect are appropriate and pleasant.    HEENT: Normocephalic, atraumatic.  Hearing intact.  Sclera anicteric.    Neck Exam: Supple, No JVD sitting up, + bilat neck bruits  Lung Exam: Clear to auscultation, even breath sounds.    Cardiac Exam: Regular rate and rhythm with 2/6 systolic mumur.  Nml S2  Abdomen: Soft, non-tender, normal bowel sounds.    Extremities: No lower extremity edema.  MAW  Psych - appropriate affect   Neuro - non focal                       LABS / OTHER STUDIES:        Labs 2/23/15 - chol 172, HDL 44, , , CMP OK, TSH 1.2  Labs 1/1/116 - CMP OK, chol 198, HDL 51, , , TSH 1.2, A1c 7.3,   Labs 8/22/16 - CMP OK, chol 182, , HDL 47, , TSH 1, A1c 6.5  Labs 2/16 - chol 182, HDL 47, ,   Labs 2/17 - CMP Ok (Cr 1.17, glc 113), A1c 6.6            Lab Results   Component Value Date/Time     Sodium 141 07/10/2017 10:54 AM     Potassium 4.3 07/10/2017 10:54 AM     Chloride 102 07/10/2017 10:54 AM     CO2 20 07/10/2017 10:54 AM     Anion gap 9 06/07/2009 03:00 AM     Glucose 193 07/10/2017 10:54 AM     BUN 30 07/10/2017 10:54 AM     Creatinine 1.40 07/10/2017 10:54 AM     BUN/Creatinine ratio 21 07/10/2017 10:54 AM     GFR est AA 39 07/10/2017 10:54 AM     GFR est non-AA 34 07/10/2017 10:54 AM     Calcium 9.6 07/10/2017 10:54 AM     Bilirubin, total 0.6 06/02/2009 04:00 PM     AST (SGOT) 17 06/02/2009 04:00 PM     Alk. phosphatase 76 06/02/2009 04:00 PM     Protein, total 7.5 06/02/2009 04:00 PM     Albumin 4.2 06/02/2009 04:00 PM     Globulin 3.3 06/02/2009 04:00 PM     A-G Ratio 1.3 06/02/2009 04:00 PM     ALT (SGPT) 25 06/02/2009 04:00 PM               Lab Results   Component Value Date/Time     WBC 5.3 07/10/2017 10:54 AM     HGB 11.2 07/10/2017 10:54 AM     HCT 34.0 07/10/2017 10:54 AM     PLATELET 540 95/94/5181 10:54 AM     MCV 73 07/10/2017 10:54 AM       Labs 2/13/18 - Cr 1.36, K 4.2, TSH 1.07, A1c 6.0   EKG 6/13/18 - CMP OK, chol 198, HDL 51, , tg 127, TSH 1.2, A1c 7.3,                 CARDIAC DIAGNOSTICS:        Cardiac Evaluation Includes:  Carotid dopplers 6/23/14 - 10-49% stenosis bilat  Echo 6/3/15 - TDS - mod LVH, LVEF 55%, mod MAC and mild MR, mild AS (mean gradient 14), mild LAE  Lexiscan Cardiolite 12/1/16 - + breast attenuation, but no ischemia. LVEF 51%.  Low risk findings.    Echo 7/7/17 - TDS. Mod LVH.  LVEF 55%.  Mild-mod AS (mean gradient 19. LAST 1.4).   Mild LAE.  RVSP 40      EKG 6/3/15 - A sensed, V paced  EKG 6/22/16 - V paced   EKG 6/21/17 - V paced   EKG 6/13/18 - V paced                   ASSESSMENT AND PLAN:        Assessment and Plan:    1) HTN    - BP looks good - continue current regimen       2) Carotid Artery disease - mild    - Carotid dopplers 6/23/14 - 10-49% stenosis bilat      3) Borderline Dyslipidemia    - A statin is reasonable given her DM and mild carotid disease.  I reviewed my recommendation for a statin.     - Ms. Teresa though says she does not want to take any statins due to potential side effects.       4) History of high grade Av block s/p pacer    - denies cardiac complaints    - Continue pacer checks as usual       5) Mild-mod AS   - recheck echo next year        6) RTC in 12 months with an echo.  Patient expressed understanding of the plan - questions were answered.      Lives with her daughter. Michell Nicholas in [address] [facility], [place], [address] [place], [address]                [phone] [address], [address] [facility], [address] [place] [address]  Ph: [phone]                                                             [phone]  Miki Bernabe  ADDENDUM   12/12/2018  Dr. Dilma Saldivar from Optho called. Patient has a central retinal artery occlusion in left eye. For workup will check carotid dopplers. Also need to interrogate pacer for any underlying Afib. Also Dr. Dilma Saldivar recommended checking a Sed rate and CRP  Also check echo. Will have nurse enact plans. Would like to see her in clinic a couple of weeks to follow-up. ADDENDUM   12/20/2018  Echo 12/19/18 - TDS. Definity. LVEF 50%. Mod LVH. Grade 1 diastology. Mild LAE. Mod MAC. Mild-mod AS (AV mean PG 14 mmHg). Carotid Doppler 12/19/18 - bilat 10-49% stenosis     Will have nurse call with findings. Will also have nurse check with pacer clinic to see if patient has had any Afib (needs device check soon if not done recently). Would like to see her in a few weeks.

## 2018-06-13 NOTE — MR AVS SNAPSHOT
[address] [address] 
[phone] Patient: Walter Jacobs MRN: [id number] GAR:[birth year] Visit Information Date & Time Provider Department Dept. Phone Encounter #  
 6/13/2018 11:00 AM Gerda Olsen MD [facility] [place]  [phone] [id number] Your Appointments 9/28/2018  9:30 AM  
PACEMAKER with PACEMAKER, YOSHI  
[facility] SCHEDULING) Appt Note: med thresholds/stf/new carelink schedule [address] [place] [address] [facility] [address]  
[phone]  
  
   
 [address] [place] [address] [address]  
  
    
  
 6/26/2018  9:15 AM  
REMOTE OFFICE VISIT with Leonard Christensen [facility] ([place] SCHEDULING) Appt Note: carelink ppi/stf  
 [address] [place] [address] [facility]  
[address] [facility] [place] [address] [address] Upcoming Health Maintenance Date Due  
 FOOT EXAM Q1 7/8/1941 MICROALBUMIN Q1 7/8/1941 EYE EXAM RETINAL OR DILATED Q1 7/8/1941 DTaP/Tdap/Td series (1 - Tdap) 7/8/1952 ZOSTER VACCINE AGE 60> 5/8/1991 GLAUCOMA SCREENING Q2Y 7/8/1996 Bone Densitometry (Dexa) Screening 7/8/1996 Pneumococcal 65+ Low/Medium Risk (1 of 2 - PCV13) 7/8/1996 HEMOGLOBIN A1C Q6M 8/19/2016 LIPID PANEL Q1 2/19/2017 MEDICARE YEARLY EXAM 3/14/2018 Influenza Age 5 to Adult 8/1/2018 Allergies as of 6/13/2018  Review Complete On: 6/13/2018 By: Gerda Olsen MD  
  
 Severity Noted Reaction Type Reactions Lisinopril  09/23/2010    Rash Face swelling Simvastatin  09/23/2010    Unknown (comments) Dry lips and mouth Current Immunizations  Never Reviewed No immunizations on file. Not reviewed this visit You Were Diagnosed With   
  
 Codes Comments Mild aortic stenosis    -  Primary ICD-10-CM: I35.0 ICD-9-CM: 424.1 Essential hypertension     ICD-10-CM: I10 
ICD-9-CM: 401.9 Vitals BP Pulse Height(growth percentile) Weight(growth percentile) BMI OB Status 122/74 64 5' 4\" (1.626 m) 158 lb (71.7 kg) 27.12 kg/m2 Hysterectomy Smoking Status Former Smoker BMI and BSA Data Body Mass Index Body Surface Area  
 27.12 kg/m 2 1.8 m 2 Preferred Pharmacy Pharmacy Name Phone [facility] [address] [facility], [address] [phone] Your Updated Medication List  
  
   
This list is accurate as of 6/13/18 11:02 AM.  Always use your most recent med list.  
  
  
  
  
 ADULT LOW DOSE ASPIRIN 81 mg tablet Generic drug:  aspirin delayed-release Take 81 mg by mouth daily. amLODIPine 5 mg tablet Commonly known as:  Jacquetta Couch Take 5 mg by mouth daily. GLUCOSAMINE-CHONDROITIN PO Take 2,000 mg by mouth.  
  
 levothyroxine 50 mcg tablet Commonly known as:  SYNTHROID Take  by mouth Daily (before breakfast). metFORMIN 500 mg tablet Commonly known as:  GLUCOPHAGE Take  by mouth two (2) times daily (with meals). PROBIOTIC 4X 10-15 mg Tbec Generic drug:  B.infantis-B.ani-B.long-B.bifi Take  by mouth daily. raNITIdine hcl 150 mg capsule Take 150 mg by mouth two (2) times a day. TOPROL  mg tablet Generic drug:  metoprolol succinate Take  by mouth daily. VITAMIN D3 1,000 unit Cap Generic drug:  cholecalciferol Take 1,000 Units by mouth daily. Introducing [facility]! [facility] introduces [employer]rise patient portal. Now you can access parts of your medical record, email your doctor's office, and request medication refills online. 1. In your internet browser, go to [email or web address] 2. Click on the First Time User? Click Here link in the Sign In box. You will see the New Member Sign Up page. 3. Enter your [employer] Access Code exactly as it appears below. You will not need to use this code after youve completed the sign-up process. If you do not sign up before the expiration date, you must request a new code. · [employer] Access Code: [email or web address] Expires: 6/18/2018  4:00 PM 
 
4. Enter the last four digits of your Social Security Number (xxxx) and Date of Birth (mm/dd/yyyy) as indicated and click Submit. You will be taken to the next sign-up page. 5. Create a [employer] ID. This will be your [employer] login ID and cannot be changed, so think of one that is secure and easy to remember. 6. Create a [employer] password. You can change your password at any time. 7. Enter your Password Reset Question and Answer. This can be used at a later time if you forget your password. 8. Enter your e-mail address. You will receive e-mail notification when new information is available in [address]. 9. Click Sign Up. You can now view and download portions of your medical record. 10. Click the Download Summary menu link to download a portable copy of your medical information. If you have questions, please visit the Frequently Asked Questions section of the [employer] website. Remember, [employer] is NOT to be used for urgent needs. For medical emergencies, dial 911. Now available from your iPhone and Android! Please provide this summary of care documentation to your next provider. Your primary care clinician is listed as Jane Waters. If you have any questions after today's visit, please call [phone].

## 2018-06-26 ENCOUNTER — OFFICE VISIT (OUTPATIENT)
Dept: CARDIOLOGY CLINIC | Age: 83
End: 2018-06-26

## 2018-06-26 DIAGNOSIS — Z95.0 CARDIAC PACEMAKER IN SITU: Primary | ICD-10-CM

## 2018-09-28 ENCOUNTER — CLINICAL SUPPORT (OUTPATIENT)
Dept: CARDIOLOGY CLINIC | Age: 83
End: 2018-09-28

## 2018-09-28 DIAGNOSIS — Z95.0 CARDIAC PACEMAKER IN SITU: Primary | ICD-10-CM

## 2018-12-12 ENCOUNTER — TELEPHONE (OUTPATIENT)
Dept: CARDIOLOGY CLINIC | Age: 83
End: 2018-12-12

## 2018-12-12 DIAGNOSIS — I10 ESSENTIAL HYPERTENSION: Primary | ICD-10-CM

## 2018-12-12 DIAGNOSIS — E78.00 PURE HYPERCHOLESTEROLEMIA: ICD-10-CM

## 2018-12-12 DIAGNOSIS — Z01.818 PRE-OP EXAMINATION: ICD-10-CM

## 2018-12-12 NOTE — TELEPHONE ENCOUNTER
----- Message from Camille Bowers MD sent at 12/12/2018 11:17 AM EST -----  Regarding: please enact plans   Please enact plans. Dr. Ricci Kebede from Optho called. Patient has a central retinal artery occlusion in left eye. For workup will check carotid dopplers. Also need to interrogate pacer for any underlying Afib. Echo also is due and should be checked. Also Dr. Ricci Kebede recommended checking a Sed rate and CRP (needs lab slip - also check CMP, CBC, and lipids if not done recently). Will have nurse enact plans. Would like to see her in clinic a couple of weeks to follow-up.      Thanks,  SK

## 2018-12-19 ENCOUNTER — CLINICAL SUPPORT (OUTPATIENT)
Dept: CARDIOLOGY CLINIC | Age: 83
End: 2018-12-19

## 2018-12-19 DIAGNOSIS — Z01.818 PRE-OP EXAMINATION: ICD-10-CM

## 2018-12-19 DIAGNOSIS — I35.0 NONRHEUMATIC AORTIC VALVE STENOSIS: Primary | ICD-10-CM

## 2018-12-19 DIAGNOSIS — E78.00 PURE HYPERCHOLESTEROLEMIA: ICD-10-CM

## 2018-12-19 DIAGNOSIS — I65.23 CAROTID STENOSIS, BILATERAL: ICD-10-CM

## 2018-12-19 DIAGNOSIS — Z95.0 CARDIAC PACEMAKER IN SITU: Primary | ICD-10-CM

## 2018-12-19 DIAGNOSIS — I10 ESSENTIAL HYPERTENSION: ICD-10-CM

## 2018-12-19 DIAGNOSIS — H34.9 RETINAL ARTERY OCCLUSION: Primary | ICD-10-CM

## 2018-12-19 NOTE — PROGRESS NOTES
Per Dr. Melvin Villafana, utilize [facility] 96. ID verified per protocol. Test and risks explained to patient. Consent signed after all questions answered. Started saline lock #22 gauge in Right forearm. 1 sticks. Good blood return and flushed without difficulty. At 1400, activated Definity (1.3 mL in 8.7 ml NS) injected  X 1. (Total 2 mLs given). No complaint of voiced. Echo images obtained. Removed saline lock and applied pressure until homeostasis achieved. Dressing applied. Patient instructed to leave dressing on times 1 hr. Verbalized understanding. Patient waited in echo room until test completed. Patient left office without complaints of voiced.

## 2018-12-19 NOTE — PROCEDURES
[facility]  *** FINAL REPORT ***    Name: Kyle Keen  MRN: [id number]       Outpatient  : 1931  HIS Order #: [id number]  [address] Visit #: [id number]  Date: 19 Dec 2018    TYPE OF TEST: Cerebrovascular Duplex    REASON FOR TEST    Right Carotid:-             Proximal               Mid                 Distal  cm/s  Systolic  Diastolic  Systolic  Diastolic  Systolic  Diastolic  CCA:     47.4       8.0                            61.0      12.0  Bulb:  ECA:     83.0       5.0  ICA:     86.0      21.0       87.0      22.0       60.0      10.0  ICA/CCA:  1.4       1.8    ICA Stenosis: <50%    Right Vertebral:-  Finding: Antegrade  Sys:       39.0  Cathy:        8.0    Right Subclavian:    Left Carotid:-            Proximal                Mid                 Distal  cm/s  Systolic  Diastolic  Systolic  Diastolic  Systolic  Diastolic  CCA:     91.4      12.0                            79.0      14.0  Bulb:  ECA:     94.0       2.0  ICA:     82.0      14.0       78.0      15.0       99.0      18.0  ICA/CCA:  1.0       1.0    ICA Stenosis: <50%    Left Vertebral:-  Finding: Antegrade  Sys:       46.0  Cathy:        9.0    Left Subclavian:    INTERPRETATION/FINDINGS  PROCEDURE:  Evaluation of the extracranial cerebrovascular arteries  with ultrasound (B-mode imaging, pulsed Doppler, color Doppler). Includes the common carotid, internal carotid, external carotid, and  vertebral arteries. FINDINGS:    Right:  Mild calcific plaque is exhibited at the origin of the  internal carotid artery. There is a narrowing of the flow channel on  color Doppler imaging, where peak systolic velocities are within  normal limits. Left:  Scattered mixed plaque is exhibited throughout the common  carotid artery, the bifurcation and the proximal segment of the  internal carotid artery.   Mild filling defects are noted on color flow   imaging and peak systolic velocities are within normal limits. IMPRESSION:  1. Bilateral 10-49% stenosis of the internal carotid arteries. 2. No significant stenosis in the external carotid arteries  bilaterally. 3. Antegrade flow in both vertebral arteries. ADDITIONAL COMMENTS    I have personally reviewed the data relevant to the interpretation of  this  study.     TECHNOLOGIST: BRANDON Rosen  Signed: 12/19/2018 03:03 PM    PHYSICIAN: Epi Boyd [facility], [facility]  Signed: 12/20/2018 05:54 PM

## 2018-12-21 ENCOUNTER — TELEPHONE (OUTPATIENT)
Dept: CARDIOLOGY CLINIC | Age: 83
End: 2018-12-21

## 2018-12-21 NOTE — TELEPHONE ENCOUNTER
Spoke with pt, id verified per protocol. Provided her test results. Per pacer clinic, no a fib seen on device check. Will have my  call.

## 2018-12-21 NOTE — TELEPHONE ENCOUNTER
----- Message from Leda Hogue MD sent at 12/20/2018  9:26 PM EST -----  Regarding: please call patient  please call. Echo 12/19/18 - TDS. Definity. LVEF 50%. Mod LVH. Grade 1 diastology. Mild LAE. Mod MAC. Mild-mod AS (AV mean PG 14 mmHg). Carotid Doppler 12/19/18 - bilat 10-49% stenosis     Will have nurse call with stable findings. Will also have nurse check with pacer clinic to see if patient has had any Afib (needs device check soon if not done recently). Would like to make sure device checked for afib. Also please have her see me in next few weeks.      Thanks,  SK

## 2019-01-09 ENCOUNTER — OFFICE VISIT (OUTPATIENT)
Dept: CARDIOLOGY CLINIC | Age: 84
End: 2019-01-09

## 2019-01-09 VITALS
SYSTOLIC BLOOD PRESSURE: 136 MMHG | DIASTOLIC BLOOD PRESSURE: 70 MMHG | BODY MASS INDEX: 28.51 KG/M2 | WEIGHT: 167 LBS | HEIGHT: 64 IN | RESPIRATION RATE: 16 BRPM | HEART RATE: 82 BPM

## 2019-01-09 DIAGNOSIS — H34.12 CENTRAL RETINAL ARTERY OCCLUSION OF LEFT EYE: Primary | ICD-10-CM

## 2019-01-09 DIAGNOSIS — E11.9 TYPE 2 DIABETES MELLITUS WITHOUT COMPLICATION, WITHOUT LONG-TERM CURRENT USE OF INSULIN (HCC): ICD-10-CM

## 2019-01-09 DIAGNOSIS — I10 ESSENTIAL HYPERTENSION: ICD-10-CM

## 2019-01-09 DIAGNOSIS — E78.00 PURE HYPERCHOLESTEROLEMIA: ICD-10-CM

## 2019-01-09 RX ORDER — ROSUVASTATIN CALCIUM 10 MG/1
10 TABLET, COATED ORAL
Qty: 90 TAB | Refills: 3 | Status: SHIPPED | OUTPATIENT
Start: 2019-01-09 | End: 2020-12-15

## 2019-01-09 RX ORDER — FLUTICASONE PROPIONATE 50 MCG
SPRAY, SUSPENSION (ML) NASAL AS NEEDED
COMMUNITY
Start: 2018-10-31

## 2019-01-09 NOTE — PROGRESS NOTES
Chief Complaint   Patient presents with    Hypertension    Aortic Stenosis    Shortness of Breath     when climbing stairs      Visit Vitals  /70 (BP 1 Location: Right arm, BP Patient Position: Sitting)   Pulse 82   Resp 16   Ht 5' 4\" (1.626 m)   Wt 167 lb (75.8 kg)   BMI 28.67 kg/m²

## 2019-01-09 NOTE — PROGRESS NOTES
Mckay Kendrick MD. [facility] [place]              Patient: Lauren Obrien  : 1931      Today's Date: 2019            HISTORY OF PRESENT ILLNESS:     History of Present Illness: Had vision loss in . Otherwise OK. No CP or SOB. BP has been OK. PAST MEDICAL HISTORY:     Past Medical History:   Diagnosis Date    Central retinal artery occlusion      - lost 90% of vision of left eye    Coronary atherosclerosis of native coronary artery     adeno card : fixed inf defect, EF 47%. Echo : EF 55%, mild MR.    Diabetes ([address]     HTN (hypertension)     Mild aortic stenosis     Mobitz (type) II atrioventricular block     Pure hypercholesterolemia          Past Surgical History:   Procedure Laterality Date    HX PACEMAKER      DDD, V lead Fx .  HX PACEMAKER      Gen Change    HX PACEMAKER      Ventricular lead failure. No access through stenosed left subclavian. No access from right side due to stenosis of SVC-RA junction. MEDICATIONS:     Current Outpatient Medications   Medication Sig Dispense Refill    fluticasone (FLONASE) 50 mcg/actuation nasal spray as needed.  metoprolol succinate (TOPROL XL) 100 mg tablet Take  by mouth daily.  B.infantis-B.ani-B.long-B.bifi (PROBIOTIC 4X) 10-15 mg TbEC Take  by mouth daily.  levothyroxine (SYNTHROID) 50 mcg tablet Take  by mouth Daily (before breakfast).  Cholecalciferol, Vitamin D3, (VITAMIN D3) 1,000 unit cap Take 1,000 Units by mouth daily.  GLUCOSAMINE/CHONDROITIN SULF A (GLUCOSAMINE-CHONDROITIN PO) Take 2,000 mg by mouth.  amLODIPine (NORVASC) 5 mg tablet Take 5 mg by mouth daily.  ranitidine hcl 150 mg capsule Take 150 mg by mouth two (2) times a day.  aspirin delayed-release (ADULT LOW DOSE ASPIRIN) 81 mg tablet Take 81 mg by mouth daily.          Allergies   Allergen Reactions    Lisinopril Rash     Face swelling    Simvastatin Unknown (comments)     Dry lips and mouth             SOCIAL HISTORY:     Social History     Tobacco Use    Smoking status: Former Smoker    Smokeless tobacco: Never Used   Substance Use Topics    Alcohol use: Yes     Alcohol/week: 0.0 oz     Comment: wine twice yearly    Drug use: No         FAMILY HISTORY:     Family History   Problem Relation Age of Onset    Hypertension Mother              REVIEW OF SYSTEMS:        Review of Systems:    Constitutional: Negative for fever, chills    HEENT: + Vision loss    Respiratory: Negative for cough    Cardiovascular: Negative for orthopnea, syncope, and PND.    Gastrointestinal: Negative for abdominal pain, diarrhea, or melena    Genitourinary: Negative for dysuria    Musculoskeletal: Negative for myalgias.    Skin: Negative for rash    Heme: No problems bleeding.    Neurological: Negative for speech change and focal weakness.    + chronic constipation                   PHYSICAL EXAM:        Physical Exam:   Visit Vitals  /70 (BP 1 Location: Right arm, BP Patient Position: Sitting)   Pulse 82   Resp 16   Ht 5' 4\" (1.626 m)   Wt 167 lb (75.8 kg)   BMI 28.67 kg/m²        Patient appears generally well, mood and affect are appropriate and pleasant.    HEENT: Normocephalic, atraumatic.  Hearing intact.  Sclera anicteric.    Neck Exam: Supple, No JVD sitting up, + bilat neck bruits  Lung Exam: Clear to auscultation, even breath sounds.    Cardiac Exam: Regular rate and rhythm with 3/6 systolic mumur.  Nml S2  Abdomen: Soft, non-tender, normal bowel sounds.    Extremities: No lower extremity edema.  MAW  Psych - appropriate affect   Neuro - non focal                       LABS / OTHER STUDIES:        Labs 2/13/18 - Cr 1.36, K 4.2, TSH 1.07, A1c 6.0   Labs 6/13/18 - CMP OK, chol 198, HDL 51, , tg 127, TSH 1.2, A1c 7.3,      Labs 8/21/18 - Cr 1.05, chol 178, HDL 42, , , A1c 5.9, TSH 1.2, LFT's OK,              CARDIAC DIAGNOSTICS:        Cardiac Evaluation Includes:  Carotid dopplers 6/23/14 - 10-49% stenosis bilat  Echo 6/3/15 - TDS - mod LVH, LVEF 55%, mod MAC and mild MR, mild AS (mean gradient 14), mild LAE  Lexiscan Cardiolite 12/1/16 - + breast attenuation, but no ischemia. LVEF 51%.  Low risk findings.    Echo 7/7/17 - TDS. Mod LVH.  LVEF 55%.  Mild-mod AS (mean gradient 19. LAST 1.4).  Mild LAE.  RVSP 40    Echo 12/19/18 - TDS. Definity. LVEF 50%. Mod LVH. Grade 1 diastology. Mild LAE. Mod MAC. Mild-mod AS (AV mean PG 14 mmHg).    Carotid Doppler 12/19/18 - bilat 10-49% stenosis       EKG 6/3/15 - A sensed, V paced  EKG 6/22/16 - V paced   EKG 6/21/17 - V paced   EKG 6/13/18 - V paced                   ASSESSMENT AND PLAN:        Assessment and Plan:    1) HTN    - BP looks good - continue current regimen       2) Carotid Artery disease - mild    - Carotid dopplers 6/23/14 - 10-49% stenosis bilat  - statin, ASA       3) Dyslipidemia    - Given central retinal artery occlusion and atherosclerosis, I think a statin would be helpful here   - Reviewed risks and benefits - Will start crestor 10 mg daily - recheck labs after 4 weeks       4) History of high grade Av block s/p pacer    - denies cardiac complaints    - Continue pacer checks as usual       5) Mild-mod AS   - see above echo results   - follow on serial studies     6) Central Retinal artery occlusion   - On 12/18 - Dr. Majo Yarbrough from Optho called.    Patient has a central retinal artery occlusion in left eye - she has lost 90% of vision in left eye  - Echo 12/19/18 - with stable findings (see above)   - Carotid Doppler 12/19/18 - bilat 10-49% stenosis   - Per pacer clinic, no afib seen on device check - continue to follow  - Check ESR/CRP per Ortho request       7) RTC in 12 months with an echo.  Patient expressed understanding of the plan - questions were answered.      Lives with her daughter. Lino West in [address], [place], [address] [facility]  [address] [place], [address]                [address] [address]  [address], [address] [place] [address], [address] [place] [address]  Ph: [phone]                                                              [phone]

## 2019-01-17 ENCOUNTER — OFFICE VISIT (OUTPATIENT)
Dept: CARDIOLOGY CLINIC | Age: 84
End: 2019-01-17

## 2019-01-17 DIAGNOSIS — Z95.0 CARDIAC PACEMAKER IN SITU: Primary | ICD-10-CM

## 2019-04-25 ENCOUNTER — OFFICE VISIT (OUTPATIENT)
Dept: CARDIOLOGY CLINIC | Age: 84
End: 2019-04-25

## 2019-04-25 DIAGNOSIS — Z95.0 CARDIAC PACEMAKER IN SITU: Primary | ICD-10-CM

## 2019-07-30 ENCOUNTER — OFFICE VISIT (OUTPATIENT)
Dept: CARDIOLOGY CLINIC | Age: 84
End: 2019-07-30

## 2019-07-30 DIAGNOSIS — Z95.0 CARDIAC PACEMAKER IN SITU: Primary | ICD-10-CM

## 2019-11-13 ENCOUNTER — CLINICAL SUPPORT (OUTPATIENT)
Dept: CARDIOLOGY CLINIC | Age: 84
End: 2019-11-13

## 2019-11-13 DIAGNOSIS — Z95.0 CARDIAC PACEMAKER IN SITU: Primary | ICD-10-CM

## 2020-04-01 ENCOUNTER — TELEPHONE (OUTPATIENT)
Dept: CARDIOLOGY CLINIC | Age: 85
End: 2020-04-01

## 2020-04-01 NOTE — TELEPHONE ENCOUNTER
Spoke to pt, she requested to r/s to 7/8 echo, OV at [address]. No complaints at this time.

## 2020-05-12 NOTE — TELEPHONE ENCOUNTER
Patient requesting to r/s 4/29 appt.  Please advise        hospitals;[phone] Speech: Speech normal.         Behavior: Behavior normal.              Assessment/Plan:  1. Cough  -treated with a zpak and tessalon per Dr Brad Rome starting yesterday. - COVID-19; Future           Darrell Anguiano PA-C  5/12/20     This visit was provided as a focused evaluation during the COVID -19 pandemic/national emergency. A comprehensive review of all previous patient history and testing was not conducted. Pertinent findings were elicited during the visit.

## 2020-09-30 ENCOUNTER — ANCILLARY PROCEDURE (OUTPATIENT)
Dept: CARDIOLOGY CLINIC | Age: 85
End: 2020-09-30
Payer: MEDICARE

## 2020-09-30 ENCOUNTER — OFFICE VISIT (OUTPATIENT)
Dept: CARDIOLOGY CLINIC | Age: 85
End: 2020-09-30
Payer: MEDICARE

## 2020-09-30 VITALS
OXYGEN SATURATION: 95 % | HEART RATE: 80 BPM | BODY MASS INDEX: 25.78 KG/M2 | SYSTOLIC BLOOD PRESSURE: 131 MMHG | DIASTOLIC BLOOD PRESSURE: 70 MMHG | HEIGHT: 64 IN | WEIGHT: 151 LBS

## 2020-09-30 VITALS
SYSTOLIC BLOOD PRESSURE: 138 MMHG | DIASTOLIC BLOOD PRESSURE: 80 MMHG | HEIGHT: 64 IN | WEIGHT: 151 LBS | BODY MASS INDEX: 25.78 KG/M2

## 2020-09-30 DIAGNOSIS — I35.0 MILD AORTIC STENOSIS: ICD-10-CM

## 2020-09-30 DIAGNOSIS — I10 ESSENTIAL HYPERTENSION: Primary | ICD-10-CM

## 2020-09-30 DIAGNOSIS — E11.51 TYPE 2 DIABETES MELLITUS WITH PERIPHERAL VASCULAR DISEASE (HCC): ICD-10-CM

## 2020-09-30 DIAGNOSIS — E78.00 PURE HYPERCHOLESTEROLEMIA: ICD-10-CM

## 2020-09-30 DIAGNOSIS — I10 ESSENTIAL HYPERTENSION: ICD-10-CM

## 2020-09-30 PROBLEM — E11.3599 TYPE 2 DIABETES MELLITUS WITH PROLIFERATIVE RETINOPATHY (HCC): Status: ACTIVE | Noted: 2020-09-30

## 2020-09-30 LAB
ECHO AO ROOT DIAM: 3.27 CM
ECHO AV AREA PEAK VELOCITY: 1.11 CM2
ECHO AV AREA VTI: 1.05 CM2
ECHO AV AREA/BSA PEAK VELOCITY: 0.6 CM2/M2
ECHO AV AREA/BSA VTI: 0.6 CM2/M2
ECHO AV MEAN GRADIENT: 13.73 MMHG
ECHO AV PEAK GRADIENT: 25.75 MMHG
ECHO AV PEAK VELOCITY: 253.73 CM/S
ECHO AV VTI: 49.19 CM
ECHO EST RA PRESSURE: 3 MMHG
ECHO LA AREA 4C: 17.12 CM2
ECHO LA MAJOR AXIS: 4.22 CM
ECHO LA MINOR AXIS: 2.43 CM
ECHO LA VOL 2C: 53.81 ML (ref 22–52)
ECHO LA VOL 4C: 49.37 ML (ref 22–52)
ECHO LA VOL BP: 54.91 ML (ref 22–52)
ECHO LA VOL/BSA BIPLANE: 31.63 ML/M2 (ref 16–28)
ECHO LA VOLUME INDEX A2C: 31 ML/M2 (ref 16–28)
ECHO LA VOLUME INDEX A4C: 28.44 ML/M2 (ref 16–28)
ECHO LV E' LATERAL VELOCITY: 3.27 CM/S
ECHO LV E' SEPTAL VELOCITY: 3.27 CM/S
ECHO LV EDV A2C: 51.42 ML
ECHO LV EDV A4C: 63.06 ML
ECHO LV EDV BP: 57.25 ML (ref 56–104)
ECHO LV EDV INDEX A4C: 36.3 ML/M2
ECHO LV EDV INDEX BP: 33 ML/M2
ECHO LV EDV NDEX A2C: 29.6 ML/M2
ECHO LV EJECTION FRACTION A2C: 48 PERCENT
ECHO LV EJECTION FRACTION A4C: 49 PERCENT
ECHO LV EJECTION FRACTION BIPLANE: 49.2 PERCENT (ref 55–100)
ECHO LV ESV A2C: 26.55 ML
ECHO LV ESV A4C: 31.99 ML
ECHO LV ESV BP: 29.11 ML (ref 19–49)
ECHO LV ESV INDEX A2C: 15.3 ML/M2
ECHO LV ESV INDEX A4C: 18.4 ML/M2
ECHO LV ESV INDEX BP: 16.8 ML/M2
ECHO LV INTERNAL DIMENSION DIASTOLIC: 5.62 CM (ref 3.9–5.3)
ECHO LV INTERNAL DIMENSION SYSTOLIC: 4.09 CM
ECHO LV IVSD: 1.35 CM (ref 0.6–0.9)
ECHO LV MASS 2D: 308.3 G (ref 67–162)
ECHO LV MASS INDEX 2D: 177.6 G/M2 (ref 43–95)
ECHO LV POSTERIOR WALL DIASTOLIC: 1.21 CM (ref 0.6–0.9)
ECHO LVOT DIAM: 2.08 CM
ECHO LVOT PEAK GRADIENT: 2.74 MMHG
ECHO LVOT PEAK VELOCITY: 82.75 CM/S
ECHO LVOT SV: 51.5 ML
ECHO LVOT VTI: 15.16 CM
ECHO MV A VELOCITY: 119.11 CM/S
ECHO MV AREA PHT: 6.26 CM2
ECHO MV E DECELERATION TIME (DT): 0.12 S
ECHO MV E VELOCITY: 51.95 CM/S
ECHO MV E/A RATIO: 0.44
ECHO MV E/E' LATERAL: 15.89
ECHO MV E/E' RATIO (AVERAGED): 15.89
ECHO MV E/E' SEPTAL: 15.89
ECHO MV PRESSURE HALF TIME (PHT): 0.04 S
ECHO RA AREA 4C: 11.94 CM2
ECHO RIGHT VENTRICULAR SYSTOLIC PRESSURE (RVSP): 31.4 MMHG
ECHO TV REGURGITANT MAX VELOCITY: 266.47 CM/S
ECHO TV REGURGITANT PEAK GRADIENT: 28.4 MMHG

## 2020-09-30 PROCEDURE — 93010 ELECTROCARDIOGRAM REPORT: CPT | Performed by: SPECIALIST

## 2020-09-30 PROCEDURE — 93005 ELECTROCARDIOGRAM TRACING: CPT | Performed by: SPECIALIST

## 2020-09-30 PROCEDURE — 99214 OFFICE O/P EST MOD 30 MIN: CPT | Performed by: SPECIALIST

## 2020-09-30 PROCEDURE — 1101F PT FALLS ASSESS-DOCD LE1/YR: CPT | Performed by: SPECIALIST

## 2020-09-30 PROCEDURE — G8536 NO DOC ELDER MAL SCRN: HCPCS | Performed by: SPECIALIST

## 2020-09-30 PROCEDURE — G0463 HOSPITAL OUTPT CLINIC VISIT: HCPCS | Performed by: SPECIALIST

## 2020-09-30 PROCEDURE — 1090F PRES/ABSN URINE INCON ASSESS: CPT | Performed by: SPECIALIST

## 2020-09-30 PROCEDURE — 93306 TTE W/DOPPLER COMPLETE: CPT | Performed by: SPECIALIST

## 2020-09-30 PROCEDURE — G8419 CALC BMI OUT NRM PARAM NOF/U: HCPCS | Performed by: SPECIALIST

## 2020-09-30 PROCEDURE — G8427 DOCREV CUR MEDS BY ELIG CLIN: HCPCS | Performed by: SPECIALIST

## 2020-09-30 PROCEDURE — G8510 SCR DEP NEG, NO PLAN REQD: HCPCS | Performed by: SPECIALIST

## 2020-09-30 RX ADMIN — PERFLUTREN 1 ML: 6.52 INJECTION, SUSPENSION INTRAVENOUS at 13:45

## 2020-09-30 NOTE — PROGRESS NOTES
Zulma Staples MD. [facility]              Patient: Ruel Vazquez  : 1931      Today's Date: 2020            HISTORY OF PRESENT ILLNESS:     History of Present Illness:  Here for follow-up. No cardiac symptoms. No chest pain or SOB. No syncope of lightheadedness. PAST MEDICAL HISTORY:     Past Medical History:   Diagnosis Date    Central retinal artery occlusion      - lost 90% of vision of left eye    Coronary atherosclerosis of native coronary artery     adeno card : fixed inf defect, EF 47%. Echo : EF 55%, mild MR.    Diabetes ([address]     HTN (hypertension)     Mild aortic stenosis     Mobitz (type) II atrioventricular block     Pure hypercholesterolemia        Past Surgical History:   Procedure Laterality Date    HX PACEMAKER      DDD, V lead Fx .  HX PACEMAKER      Gen Change    HX PACEMAKER      Ventricular lead failure. No access through stenosed left subclavian. No access from right side due to stenosis of SVC-RA junction. MEDICATIONS:     Current Outpatient Medications   Medication Sig Dispense Refill    fluticasone (FLONASE) 50 mcg/actuation nasal spray as needed.  rosuvastatin (CRESTOR) 10 mg tablet Take 1 Tab by mouth nightly. 90 Tab 3    metoprolol succinate (TOPROL XL) 100 mg tablet Take  by mouth daily.  B.infantis-B.ani-B.long-B.bifi (PROBIOTIC 4X) 10-15 mg TbEC Take  by mouth daily.  levothyroxine (SYNTHROID) 50 mcg tablet Take  by mouth Daily (before breakfast).  Cholecalciferol, Vitamin D3, (VITAMIN D3) 1,000 unit cap Take 1,000 Units by mouth daily.  GLUCOSAMINE/CHONDROITIN SULF A (GLUCOSAMINE-CHONDROITIN PO) Take 2,000 mg by mouth.  amLODIPine (NORVASC) 5 mg tablet Take 5 mg by mouth daily.  aspirin delayed-release (ADULT LOW DOSE ASPIRIN) 81 mg tablet Take 81 mg by mouth daily.          Allergies   Allergen Reactions    Lisinopril Rash     Face swelling    Simvastatin Unknown (comments)     Dry lips and mouth             SOCIAL HISTORY:     Social History     Tobacco Use    Smoking status: Former Smoker    Smokeless tobacco: Never Used   Substance Use Topics    Alcohol use: Yes     Alcohol/week: 0.0 standard drinks     Comment: wine twice yearly    Drug use: No         FAMILY HISTORY:     Family History   Problem Relation Age of Onset    Hypertension Mother            REVIEW OF SYSTEMS:        Review of Systems:    Constitutional: Negative for fever, chills    HEENT: + Vision loss    Respiratory: Negative for cough    Cardiovascular: Negative for orthopnea, syncope, and PND.    Gastrointestinal: Negative for abdominal pain, diarrhea, or melena    Genitourinary: Negative for dysuria    Musculoskeletal: Negative for myalgias.    Skin: Negative for rash    Heme: No problems bleeding.    Neurological: Negative for speech change and focal weakness.    + chronic constipation                   PHYSICAL EXAM:        Physical Exam:   Visit Vitals  /70 (BP 1 Location: Left arm, BP Patient Position: Sitting)   Pulse 80   Ht 5' 4\" (1.626 m)   Wt 151 lb (68.5 kg)   SpO2 95%   BMI 25.92 kg/m²          Patient appears generally well, mood and affect are appropriate and pleasant.    HEENT: Normocephalic, atraumatic.  Hearing intact.  Sclera anicteric.    Neck Exam: Supple, No JVD sitting up, + bilat neck bruits  Lung Exam: Clear to auscultation, even breath sounds.    Cardiac Exam: Regular rate and rhythm with 3/6 systolic mumur.  Nml S2  Abdomen: Soft, non-tender, normal bowel sounds.    Extremities: No lower extremity edema.  MAW  Psych - appropriate affect   Neuro - non focal                       LABS / OTHER STUDIES:        Labs 2/13/18 - Cr 1.36, K 4.2, TSH 1.07, A1c 6.0   Labs 6/13/18 - CMP OK, chol 198, HDL 51, , tg 127, TSH 1.2, A1c 7.3,      Labs 8/21/18 - Cr 1.05, chol 178, HDL 42, , , A1c 5.9, TSH 1.2, LFT's OK,      Labs 5/8/20 - TSH and BMP Ok, A1c 6.3,         CARDIAC DIAGNOSTICS:        Cardiac Evaluation Includes:  Carotid dopplers 6/23/14 - 10-49% stenosis bilat  Echo 6/3/15 - TDS - mod LVH, LVEF 55%, mod MAC and mild MR, mild AS (mean gradient 14), mild LAE  Lexiscan Cardiolite 12/1/16 - + breast attenuation, but no ischemia. LVEF 51%.  Low risk findings.    Echo 7/7/17 - TDS. Mod LVH.  LVEF 55%.  Mild-mod AS (mean gradient 19. LAST 1.4).  Mild LAE.  RVSP 40     Echo 12/19/18 - TDS.  Definity.  LVEF 50%.  Mod LVH.  Grade 1 diastology. Mild LAE.  Mod MAC.  Mild-mod AS (AV mean PG 14 mmHg).       Carotid Doppler 12/19/18 - bilat 10-49% stenosis     Echo 9/30/20 - LVEF 50%, mild-mod AS       EKG 6/3/15 - A sensed, V paced  EKG 6/22/16 - V paced   EKG 6/21/17 - V paced   EKG 6/13/18 - V paced   EKG 9/30/20 - V paced                   ASSESSMENT AND PLAN:        Assessment and Plan:    1) HTN    - BP looks good - continue current regimen       2) Carotid Artery disease - mild    - Carotid dopplers 6/23/14 - 10-49% stenosis bilat  - statin, ASA       3) Dyslipidemia    - Given central retinal artery occlusion and atherosclerosis, I think a statin would be helpful here   - Cont statin       4) History of high grade Av block s/p pacer    - denies cardiac complaints    - Continue pacer checks as usual       5) Mild-mod AS   - see above echo results   - follow on serial studies      6) Central Retinal artery occlusion   - On 12/18 - Dr. Warren Beckham from [facility] called.    Patient has a central retinal artery occlusion in left eye - she has lost 90% of vision in left eye  - Echo 12/19/18 - with stable findings (see above)   - Carotid Doppler 12/19/18 - bilat 10-49% stenosis   - Per pacer clinic, no afib seen on device check - continue to follow      7) RTC in 12 months with an echo.  Patient expressed understanding of the plan - questions were answered.      Lives with her daughter. Ruizdanilo Tinsley in [address] MD, [address] [place] [address] [facility]  [address] [place] [address] [address]  [facility] [address]  Ph: [phone]    [phone]      ADDENDUM   12/17/2021        Echo 12/16/2021 - LVEF 50-55%,  Mild-mod AS (mean PG 18 mmHg), MAD      Will have nurse call - stable findings

## 2020-09-30 NOTE — PROGRESS NOTES
Chief Complaint   Patient presents with    Annual Wellness Visit    Pacemaker Check    Cholesterol Problem    Hypertension     Visit Vitals  /70 (BP 1 Location: Left arm, BP Patient Position: Sitting)   Pulse 80   Ht 5' 4\" (1.626 m)   Wt 151 lb (68.5 kg)   SpO2 95%   BMI 25.92 kg/m²       Chest pain:  Denies      SOB:   Denies     Dizziness:   Denies    Swelling in hands, leg and feet:   Denies     Recent stays:  denies    Refills:  No

## 2020-12-15 ENCOUNTER — OFFICE VISIT (OUTPATIENT)
Dept: CARDIOLOGY CLINIC | Age: 85
End: 2020-12-15
Payer: MEDICARE

## 2020-12-15 ENCOUNTER — CLINICAL SUPPORT (OUTPATIENT)
Dept: CARDIOLOGY CLINIC | Age: 85
End: 2020-12-15
Payer: MEDICARE

## 2020-12-15 VITALS
OXYGEN SATURATION: 99 % | HEART RATE: 84 BPM | HEIGHT: 64 IN | BODY MASS INDEX: 25.27 KG/M2 | WEIGHT: 148 LBS | SYSTOLIC BLOOD PRESSURE: 136 MMHG | DIASTOLIC BLOOD PRESSURE: 76 MMHG

## 2020-12-15 DIAGNOSIS — Z95.0 CARDIAC PACEMAKER IN SITU: Primary | ICD-10-CM

## 2020-12-15 DIAGNOSIS — R00.1 BRADYCARDIA: ICD-10-CM

## 2020-12-15 DIAGNOSIS — I44.1 MOBITZ TYPE II ATRIOVENTRICULAR BLOCK: ICD-10-CM

## 2020-12-15 DIAGNOSIS — E11.51 TYPE 2 DIABETES MELLITUS WITH PERIPHERAL VASCULAR DISEASE (HCC): ICD-10-CM

## 2020-12-15 DIAGNOSIS — Z95.0 CARDIAC PACEMAKER IN SITU: ICD-10-CM

## 2020-12-15 DIAGNOSIS — I10 ESSENTIAL HYPERTENSION: Primary | ICD-10-CM

## 2020-12-15 PROCEDURE — G8432 DEP SCR NOT DOC, RNG: HCPCS | Performed by: NURSE PRACTITIONER

## 2020-12-15 PROCEDURE — 93288 INTERROG EVL PM/LDLS PM IP: CPT

## 2020-12-15 PROCEDURE — G8427 DOCREV CUR MEDS BY ELIG CLIN: HCPCS | Performed by: NURSE PRACTITIONER

## 2020-12-15 PROCEDURE — 93280 PM DEVICE PROGR EVAL DUAL: CPT

## 2020-12-15 PROCEDURE — 1101F PT FALLS ASSESS-DOCD LE1/YR: CPT | Performed by: NURSE PRACTITIONER

## 2020-12-15 PROCEDURE — 1090F PRES/ABSN URINE INCON ASSESS: CPT | Performed by: NURSE PRACTITIONER

## 2020-12-15 PROCEDURE — G8419 CALC BMI OUT NRM PARAM NOF/U: HCPCS | Performed by: NURSE PRACTITIONER

## 2020-12-15 PROCEDURE — G8536 NO DOC ELDER MAL SCRN: HCPCS | Performed by: NURSE PRACTITIONER

## 2020-12-15 PROCEDURE — G0463 HOSPITAL OUTPT CLINIC VISIT: HCPCS | Performed by: NURSE PRACTITIONER

## 2020-12-15 PROCEDURE — 99215 OFFICE O/P EST HI 40 MIN: CPT | Performed by: NURSE PRACTITIONER

## 2020-12-15 NOTE — PROGRESS NOTES
Room 4     Visit Vitals  /76   Pulse 84   Ht 5' 4\" (1.626 m)   Wt 148 lb (67.1 kg)   SpO2 99%   BMI 25.40 kg/m²       Chest pain: no  Shortness of breath: no  Edema: no  Palpitations, Skipped beats, Rapid heartbeat: no  Dizziness: no    New diagnosis/Surgeries: no    ER/Hospitalizations: no    Refills: no

## 2020-12-15 NOTE — PROGRESS NOTES
HISTORY OF PRESENTING ILLNESS      Donald Cabrera is a 80 y.o. female with hypertension, diabetes, dyslipidemia, bradycardia and pacemaker who underwent pacemaker generator change. Device interrogation reveals normal device functioning, she is dependent. She is feeling well and reports improved energy and shortness of breath since her upgrade. She was last seen 9/2017. She denies cardiac complaints and her device has been functioning normally. Echocardiogram in 9/2020 showed preserved EF. PAST MEDICAL HISTORY     Past Medical History:   Diagnosis Date    Central retinal artery occlusion     12/18 - lost 90% of vision of left eye    Coronary atherosclerosis of native coronary artery     adeno card 2008: fixed inf defect, EF 47%. Echo 2008: EF 55%, mild MR.    Diabetes ([address]     HTN (hypertension)     Mild aortic stenosis     Mobitz (type) II atrioventricular block     Pure hypercholesterolemia            PAST SURGICAL HISTORY     Past Surgical History:   Procedure Laterality Date    HX PACEMAKER  2008    DDD, V lead Fx 2009.  HX PACEMAKER  2008    Gen Change    HX PACEMAKER  2009    Ventricular lead failure. No access through stenosed left subclavian. No access from right side due to stenosis of SVC-RA junction. ALLERGIES     Allergies   Allergen Reactions    Lisinopril Rash     Face swelling    Simvastatin Unknown (comments)     Dry lips and mouth          FAMILY HISTORY     Family History   Problem Relation Age of Onset    Hypertension Mother     negative for cardiac disease       SOCIAL HISTORY     Social History     Socioeconomic History    Marital status:      Spouse name: Not on file    Number of children: Not on file    Years of education: Not on file    Highest education level: Not on file   Tobacco Use    Smoking status: Former Smoker    Smokeless tobacco: Never Used   Substance and Sexual Activity    Alcohol use:  Yes     Alcohol/week: 0.0 standard drinks     Comment: wine twice yearly    Drug use: No         MEDICATIONS     Current Outpatient Medications   Medication Sig    fluticasone (FLONASE) 50 mcg/actuation nasal spray as needed.  metoprolol succinate (TOPROL XL) 100 mg tablet Take  by mouth daily.  B.infantis-B.ani-B.long-B.bifi (PROBIOTIC 4X) 10-15 mg TbEC Take  by mouth daily.  levothyroxine (SYNTHROID) 50 mcg tablet Take  by mouth Daily (before breakfast).  Cholecalciferol, Vitamin D3, (VITAMIN D3) 1,000 unit cap Take 1,000 Units by mouth daily.  GLUCOSAMINE/CHONDROITIN SULF A (GLUCOSAMINE-CHONDROITIN PO) Take 2,000 mg by mouth.  amLODIPine (NORVASC) 5 mg tablet Take 5 mg by mouth daily.  aspirin delayed-release (ADULT LOW DOSE ASPIRIN) 81 mg tablet Take 81 mg by mouth daily. No current facility-administered medications for this visit. I have reviewed the nurses notes, vitals, problem list, allergy list, medical history, family, social history and medications. REVIEW OF SYMPTOMS      General: Pt denies excessive weight gain or loss. Pt is able to conduct ADL's  HEENT: Denies blurred vision, headaches, hearing loss, epistaxis and difficulty swallowing. Respiratory: Denies cough, congestion, shortness of breath, BEDOYA, wheezing or stridor. Cardiovascular: Denies precordial pain, palpitations, edema or PND  Gastrointestinal: Denies poor appetite, indigestion, abdominal pain or blood in stool  Genitourinary: Denies hematuria, dysuria, increased urinary frequency  Musculoskeletal: Denies joint pain or swelling from muscles or joints  Neurologic: Denies tremor, paresthesias, headache, or sensory motor disturbance  Psychiatric: Denies confusion, insomnia, depression  Integumentray: Denies rash, itching or ulcers. Hematologic: Denies easy bruising, bleeding       PHYSICAL EXAMINATION      Vitals: see vitals section  General: Well developed, in no acute distress.   HEENT: No jaundice, oral mucosa moist, no oral ulcers  Neck: Supple, no stiffness, no lymphadenopathy, supple  Heart:  Normal S1/S2 negative S3 or S4. Regular, no murmur, gallop or rub, no jugular venous distention  Respiratory: Clear bilaterally x 4, no wheezing or rales  Abdomen:   Soft, non-tender, bowel sounds are active. Extremities:  No edema, normal cap refill, no cyanosis. Musculoskeletal: No clubbing, no deformities  Neuro: A&Ox3, speech clear, gait stable, cooperative, no focal neurologic deficits  Skin: Skin color is normal. No rashes or lesions. Non diaphoretic, moist.  Vascular: 2+ pulses symmetric in all extremities       DIAGNOSTIC DATA      EKG:        LABORATORY DATA      Lab Results   Component Value Date/Time    WBC 5.3 07/10/2017 10:54 AM    HGB 11.2 07/10/2017 10:54 AM    HCT 34.0 07/10/2017 10:54 AM    PLATELET 872 80/66/3498 10:54 AM    MCV 73 (L) 07/10/2017 10:54 AM      Lab Results   Component Value Date/Time    Sodium 141 07/10/2017 10:54 AM    Potassium 4.3 07/10/2017 10:54 AM    Chloride 102 07/10/2017 10:54 AM    CO2 20 07/10/2017 10:54 AM    Anion gap 9 06/07/2009 03:00 AM    Glucose 193 (H) 07/10/2017 10:54 AM    BUN 30 (H) 07/10/2017 10:54 AM    Creatinine 1.40 (H) 07/10/2017 10:54 AM    BUN/Creatinine ratio 21 07/10/2017 10:54 AM    GFR est AA 39 (L) 07/10/2017 10:54 AM    GFR est non-AA 34 (L) 07/10/2017 10:54 AM    Calcium 9.6 07/10/2017 10:54 AM    Bilirubin, total 0.6 06/02/2009 04:00 PM    Alk. phosphatase 76 06/02/2009 04:00 PM    Protein, total 7.5 06/02/2009 04:00 PM    Albumin 4.2 06/02/2009 04:00 PM    Globulin 3.3 06/02/2009 04:00 PM    A-G Ratio 1.3 06/02/2009 04:00 PM    ALT (SGPT) 25 (L) 06/02/2009 04:00 PM           ASSESSMENT      1. Pacemaker  2. Diabetes  3. Hypertension  4. Dyslipidemia  5. Bradycardia       PLAN           ICD-10-CM ICD-9-CM    1. Essential hypertension  I10 401.9    2. Cardiac pacemaker in situ  Z95.0 V45.01    3. Bradycardia  R00.1 427.89    4.  Mobitz type II atrioventricular block I44.1 426.12    5. Type 2 diabetes mellitus with peripheral vascular disease (HCC)  E11.51 250.70      443.81      No orders of the defined types were placed in this encounter. FOLLOW-UP   1 year    Thank you, Iveth Solorzano MD and Dr. Mk Harkins for allowing me to participate in the care of this extraordinarily pleasant female. Please do not hesitate to contact me for further questions/concerns.      IRON Gardners[facility]humaira [facility] 92.  [address] [address]  [facility]    [address] [facility]  [phone] / [phone] Fax   [phone] / [phone] Fax

## 2021-03-16 ENCOUNTER — OFFICE VISIT (OUTPATIENT)
Dept: CARDIOLOGY CLINIC | Age: 86
End: 2021-03-16
Payer: MEDICARE

## 2021-03-16 DIAGNOSIS — Z95.0 CARDIAC PACEMAKER IN SITU: Primary | ICD-10-CM

## 2021-03-16 PROCEDURE — 93294 REM INTERROG EVL PM/LDLS PM: CPT | Performed by: INTERNAL MEDICINE

## 2021-03-16 NOTE — LETTER
3/24/2021 1:13 PM 
 
Ms. Tommie Stephens [address] [address] Dear Patient, We have received your recent remote monitor check of your implanted device scheduled on  3/16/2021. Your remaining estimated battery life is 6 years  and your device is working normally & appropriately. Your next remote monitor check is scheduled for  6/15/2021. If you have any questions, please call the Pacemaker/ICD clinic at the [facility] location at [phone]. Sincerely, 
 
Luana PEREZ, RN Cardiac Device Clinic Coordinator [facility] [address] 
[phone]

## 2021-06-15 ENCOUNTER — OFFICE VISIT (OUTPATIENT)
Dept: CARDIOLOGY CLINIC | Age: 86
End: 2021-06-15
Payer: MEDICARE

## 2021-06-15 DIAGNOSIS — Z95.0 CARDIAC PACEMAKER IN SITU: Primary | ICD-10-CM

## 2021-06-15 PROCEDURE — 93294 REM INTERROG EVL PM/LDLS PM: CPT | Performed by: INTERNAL MEDICINE

## 2021-06-15 NOTE — LETTER
6/21/2021 1:36 PM 
 
Ms. Ko Foss 9 [address] [address] Dear Ms. Ko Foss, We have received your recent remote monitor check of your implanted device scheduled on  6/15/2021. Your remaining estimated battery life is 5.5 years and your device is working normally & appropriately. Your next remote monitor check is scheduled for 9/14/2021. You do not need to report to the office as this occurs during the night from your home. Your next clinic/office check is scheduled for  Wednesday, 12/15/2021 at 9:20 am.  You will have your device checked then see the provider. Please bring a complete list of your medications with strengths and dosages to this appointment. Please plan to arrive 10 minutes early to allow time for check-in.  parking has resumed again and is available Monday through Friday from 7:00 am to 5:00 pm. 
 
If you have any questions, please call the Pacemaker/ICD clinic at the [facility] location at [phone]. We appreciate you staying remotely connected. Sincerely, 
 
Estiven PEREZ, RN Cardiac Device Clinic Coordinator [facility] [address] [facility] [address] [place] [address] 
[phone] [email or web address]

## 2021-10-05 ENCOUNTER — OFFICE VISIT (OUTPATIENT)
Dept: CARDIOLOGY CLINIC | Age: 86
End: 2021-10-05

## 2021-10-05 DIAGNOSIS — Z95.0 CARDIAC PACEMAKER IN SITU: Primary | ICD-10-CM

## 2021-12-15 ENCOUNTER — ANCILLARY PROCEDURE (OUTPATIENT)
Dept: CARDIOLOGY CLINIC | Age: 86
End: 2021-12-15
Payer: MEDICARE

## 2021-12-15 ENCOUNTER — CLINICAL SUPPORT (OUTPATIENT)
Dept: CARDIOLOGY CLINIC | Age: 86
End: 2021-12-15
Payer: MEDICARE

## 2021-12-15 VITALS
HEIGHT: 64 IN | SYSTOLIC BLOOD PRESSURE: 132 MMHG | BODY MASS INDEX: 25.27 KG/M2 | WEIGHT: 148 LBS | DIASTOLIC BLOOD PRESSURE: 72 MMHG

## 2021-12-15 DIAGNOSIS — Z95.0 CARDIAC PACEMAKER IN SITU: Primary | ICD-10-CM

## 2021-12-15 DIAGNOSIS — I10 ESSENTIAL HYPERTENSION: ICD-10-CM

## 2021-12-15 DIAGNOSIS — I35.0 MILD AORTIC STENOSIS: ICD-10-CM

## 2021-12-15 PROCEDURE — 93280 PM DEVICE PROGR EVAL DUAL: CPT | Performed by: NURSE PRACTITIONER

## 2021-12-15 PROCEDURE — 93306 TTE W/DOPPLER COMPLETE: CPT | Performed by: SPECIALIST

## 2021-12-16 LAB
ECHO AO ROOT DIAM: 3.4 CM
ECHO AO ROOT INDEX: 1.98 CM/M2
ECHO AV AREA PEAK VELOCITY: 1.1 CM2
ECHO AV AREA VTI: 1.2 CM2
ECHO AV AREA/BSA PEAK VELOCITY: 0.6 CM2/M2
ECHO AV AREA/BSA VTI: 0.7 CM2/M2
ECHO AV MEAN GRADIENT: 18 MMHG
ECHO AV MEAN VELOCITY: 2 M/S
ECHO AV PEAK GRADIENT: 32 MMHG
ECHO AV PEAK VELOCITY: 2.8 M/S
ECHO AV VELOCITY RATIO: 0.36
ECHO AV VTI: 59.4 CM
ECHO EST RA PRESSURE: 3 MMHG
ECHO LA DIAMETER INDEX: 2.62 CM/M2
ECHO LA DIAMETER: 4.5 CM
ECHO LA TO AORTIC ROOT RATIO: 1.32
ECHO LA VOL 2C: 43 ML (ref 22–52)
ECHO LA VOL 4C: 49 ML (ref 22–52)
ECHO LA VOLUME AREA LENGTH: 52 ML
ECHO LA VOLUME INDEX A2C: 25 ML/M2 (ref 16–28)
ECHO LA VOLUME INDEX A4C: 28 ML/M2 (ref 16–28)
ECHO LA VOLUME INDEX AREA LENGTH: 30 ML/M2
ECHO LV E' LATERAL VELOCITY: 5 CM/S
ECHO LV E' SEPTAL VELOCITY: 3 CM/S
ECHO LV EDV A4C: 90 ML
ECHO LV EDV INDEX A4C: 52 ML/M2
ECHO LV EJECTION FRACTION A4C: 50 %
ECHO LV ESV A4C: 45 ML
ECHO LV ESV INDEX A4C: 26 ML/M2
ECHO LV FRACTIONAL SHORTENING: 29 % (ref 28–44)
ECHO LV INTERNAL DIMENSION DIASTOLE INDEX: 2.85 CM/M2
ECHO LV INTERNAL DIMENSION DIASTOLIC: 4.9 CM (ref 3.9–5.3)
ECHO LV INTERNAL DIMENSION SYSTOLIC INDEX: 2.03 CM/M2
ECHO LV INTERNAL DIMENSION SYSTOLIC: 3.5 CM
ECHO LV IVSD: 1.3 CM (ref 0.6–0.9)
ECHO LV MASS 2D: 239.9 G (ref 67–162)
ECHO LV MASS INDEX 2D: 139.5 G/M2 (ref 43–95)
ECHO LV POSTERIOR WALL DIASTOLIC: 1.2 CM (ref 0.6–0.9)
ECHO LV RELATIVE WALL THICKNESS RATIO: 0.49
ECHO LVOT AREA: 3.1 CM2
ECHO LVOT AV VTI INDEX: 0.38
ECHO LVOT DIAM: 2 CM
ECHO LVOT MEAN GRADIENT: 3 MMHG
ECHO LVOT PEAK GRADIENT: 4 MMHG
ECHO LVOT PEAK VELOCITY: 1 M/S
ECHO LVOT STROKE VOLUME INDEX: 41.6 ML/M2
ECHO LVOT SV: 71.6 ML
ECHO LVOT VTI: 22.8 CM
ECHO MV A VELOCITY: 1.19 M/S
ECHO MV AREA PHT: 3.5 CM2
ECHO MV E DECELERATION TIME (DT): 218.9 MS
ECHO MV E VELOCITY: 0.91 M/S
ECHO MV E/A RATIO: 0.76
ECHO MV E/E' LATERAL: 18.2
ECHO MV E/E' RATIO (AVERAGED): 24.27
ECHO MV E/E' SEPTAL: 30.33
ECHO MV PRESSURE HALF TIME (PHT): 63.5 MS
ECHO RIGHT VENTRICULAR SYSTOLIC PRESSURE (RVSP): 29 MMHG
ECHO RV TAPSE: 2.5 CM (ref 1.5–2)
ECHO TV REGURGITANT MAX VELOCITY: 2.55 M/S
ECHO TV REGURGITANT PEAK GRADIENT: 26 MMHG

## 2021-12-16 PROCEDURE — 93306 TTE W/DOPPLER COMPLETE: CPT | Performed by: SPECIALIST

## 2021-12-17 ENCOUNTER — TELEPHONE (OUTPATIENT)
Dept: CARDIOLOGY CLINIC | Age: 86
End: 2021-12-17

## 2021-12-17 NOTE — TELEPHONE ENCOUNTER
Called pt,  Per Dr. Stevenson Dubois: Noreen Gottron you please let her know echo shows stable findings.  Thanks\"

## 2022-03-19 PROBLEM — E11.51 TYPE 2 DIABETES MELLITUS WITH PERIPHERAL VASCULAR DISEASE (HCC): Status: ACTIVE | Noted: 2020-09-30

## 2022-03-20 PROBLEM — E11.3599 TYPE 2 DIABETES MELLITUS WITH PROLIFERATIVE RETINOPATHY (HCC): Status: ACTIVE | Noted: 2020-09-30

## 2022-03-28 ENCOUNTER — OFFICE VISIT (OUTPATIENT)
Dept: CARDIOLOGY CLINIC | Age: 87
End: 2022-03-28
Payer: MEDICARE

## 2022-03-28 VITALS
DIASTOLIC BLOOD PRESSURE: 90 MMHG | SYSTOLIC BLOOD PRESSURE: 182 MMHG | RESPIRATION RATE: 18 BRPM | HEART RATE: 68 BPM | BODY MASS INDEX: 23.8 KG/M2 | HEIGHT: 64 IN | WEIGHT: 139.4 LBS

## 2022-03-28 DIAGNOSIS — R00.1 BRADYCARDIA: Primary | ICD-10-CM

## 2022-03-28 DIAGNOSIS — Z95.0 CARDIAC PACEMAKER IN SITU: Primary | ICD-10-CM

## 2022-03-28 PROCEDURE — 93280 PM DEVICE PROGR EVAL DUAL: CPT | Performed by: INTERNAL MEDICINE

## 2022-03-28 PROCEDURE — 1101F PT FALLS ASSESS-DOCD LE1/YR: CPT | Performed by: NURSE PRACTITIONER

## 2022-03-28 PROCEDURE — G8427 DOCREV CUR MEDS BY ELIG CLIN: HCPCS | Performed by: NURSE PRACTITIONER

## 2022-03-28 PROCEDURE — G0463 HOSPITAL OUTPT CLINIC VISIT: HCPCS | Performed by: NURSE PRACTITIONER

## 2022-03-28 PROCEDURE — G8432 DEP SCR NOT DOC, RNG: HCPCS | Performed by: NURSE PRACTITIONER

## 2022-03-28 PROCEDURE — 99215 OFFICE O/P EST HI 40 MIN: CPT | Performed by: NURSE PRACTITIONER

## 2022-03-28 PROCEDURE — 1090F PRES/ABSN URINE INCON ASSESS: CPT | Performed by: NURSE PRACTITIONER

## 2022-03-28 PROCEDURE — G8420 CALC BMI NORM PARAMETERS: HCPCS | Performed by: NURSE PRACTITIONER

## 2022-03-28 PROCEDURE — G8536 NO DOC ELDER MAL SCRN: HCPCS | Performed by: NURSE PRACTITIONER

## 2022-03-28 NOTE — PROGRESS NOTES
Room #: 3    No complaints. BP elevated. Thinks she may have not taken her metoprolol today. Patient takes her BP regularly at home and the highest reading she ever gets in 140/80. Visit Vitals  BP (!) 210/102 (BP 1 Location: Left upper arm, BP Patient Position: Sitting, BP Cuff Size: Adult)   Pulse 68   Resp 18   Ht 5' 4\" (1.626 m)   Wt 139 lb 6.4 oz (63.2 kg)   BMI 23.93 kg/m²         Chest pain:  NO  Shortness of breath:  NO  Edema: NO  Palpitations, skipped beats, rapid heartbeat:  NO  Dizziness:  NO    1. Have you been to the ER, urgent care clinic since your last visit? Hospitalized since your last visit? No    2. Have you seen or consulted any other health care providers outside of the [address] [place] [address] since your last visit? Include any pap smears or colon screening.  No      Refills:  NO

## 2022-03-28 NOTE — PROGRESS NOTES
HISTORY OF PRESENTING ILLNESS      Misbah Hickman is a 80 y.o. female with hypertension, diabetes, dyslipidemia, bradycardia and pacemaker here for device follow up. Most recent echocardiogram demonstrated preserved LV function. PAST MEDICAL HISTORY     Past Medical History:   Diagnosis Date    Central retinal artery occlusion     12/18 - lost 90% of vision of left eye    Coronary atherosclerosis of native coronary artery     adeno card 2008: fixed inf defect, EF 47%. Echo 2008: EF 55%, mild MR.    Diabetes ([address]     HTN (hypertension)     Mild aortic stenosis     Mobitz (type) II atrioventricular block     Pure hypercholesterolemia            PAST SURGICAL HISTORY     Past Surgical History:   Procedure Laterality Date    HX PACEMAKER  2008    DDD, V lead Fx 2009.  HX PACEMAKER  2008    Gen Change    HX PACEMAKER  2009    Ventricular lead failure. No access through stenosed left subclavian. No access from right side due to stenosis of SVC-RA junction. ALLERGIES     Allergies   Allergen Reactions    Lisinopril Rash     Face swelling    Simvastatin Unknown (comments)     Dry lips and mouth          FAMILY HISTORY     Family History   Problem Relation Age of Onset    Hypertension Mother     negative for cardiac disease       SOCIAL HISTORY     Social History     Socioeconomic History    Marital status:    Tobacco Use    Smoking status: Former Smoker    Smokeless tobacco: Never Used   Substance and Sexual Activity    Alcohol use: Yes     Alcohol/week: 0.0 standard drinks     Comment: wine twice yearly    Drug use: No         MEDICATIONS     Current Outpatient Medications   Medication Sig    fluticasone (FLONASE) 50 mcg/actuation nasal spray as needed.  metoprolol succinate (TOPROL XL) 100 mg tablet Take  by mouth daily.  B.infantis-B.ani-B.long-B.bifi (PROBIOTIC 4X) 10-15 mg TbEC Take  by mouth daily.     levothyroxine (SYNTHROID) 50 mcg tablet Take  by mouth Daily (before breakfast).  Cholecalciferol, Vitamin D3, (VITAMIN D3) 1,000 unit cap Take 1,000 Units by mouth daily.  GLUCOSAMINE/CHONDROITIN SULF A (GLUCOSAMINE-CHONDROITIN PO) Take 2,000 mg by mouth.  amLODIPine (NORVASC) 5 mg tablet Take 5 mg by mouth daily.  aspirin delayed-release (ADULT LOW DOSE ASPIRIN) 81 mg tablet Take 81 mg by mouth daily. No current facility-administered medications for this visit. I have reviewed the nurses notes, vitals, problem list, allergy list, medical history, family, social history and medications. REVIEW OF SYMPTOMS      General: Pt denies excessive weight gain or loss. Pt is able to conduct ADL's  HEENT: Denies blurred vision, headaches, hearing loss, epistaxis and difficulty swallowing. Respiratory: Denies cough, congestion, shortness of breath, BEDOYA, wheezing or stridor. Cardiovascular: Denies precordial pain, palpitations, edema or PND  Gastrointestinal: Denies poor appetite, indigestion, abdominal pain or blood in stool  Genitourinary: Denies hematuria, dysuria, increased urinary frequency  Musculoskeletal: Denies joint pain or swelling from muscles or joints  Neurologic: Denies tremor, paresthesias, headache, or sensory motor disturbance  Psychiatric: Denies confusion, insomnia, depression  Integumentray: Denies rash, itching or ulcers. Hematologic: Denies easy bruising, bleeding       PHYSICAL EXAMINATION      Vitals: see vitals section  General: Well developed, in no acute distress. HEENT: No jaundice, oral mucosa moist, no oral ulcers  Neck: Supple, no stiffness, no lymphadenopathy, supple  Heart:  Normal S1/S2 negative S3 or S4. Regular, no murmur, gallop or rub, no jugular venous distention  Respiratory: Clear bilaterally x 4, no wheezing or rales  Abdomen:   Soft, non-tender, bowel sounds are active. Extremities:  No edema, normal cap refill, no cyanosis.   Musculoskeletal: No clubbing, no deformities  Neuro: A&Ox3, speech clear, gait stable, cooperative, no focal neurologic deficits  Skin: Skin color is normal. No rashes or lesions. Non diaphoretic, moist.  Vascular: 2+ pulses symmetric in all extremities       DIAGNOSTIC DATA      EKG:        LABORATORY DATA      Lab Results   Component Value Date/Time    WBC 5.3 07/10/2017 10:54 AM    HGB 11.2 07/10/2017 10:54 AM    HCT 34.0 07/10/2017 10:54 AM    PLATELET 881 05/26/7804 10:54 AM    MCV 73 (L) 07/10/2017 10:54 AM      Lab Results   Component Value Date/Time    Sodium 141 07/10/2017 10:54 AM    Potassium 4.3 07/10/2017 10:54 AM    Chloride 102 07/10/2017 10:54 AM    CO2 20 07/10/2017 10:54 AM    Anion gap 9 06/07/2009 03:00 AM    Glucose 193 (H) 07/10/2017 10:54 AM    BUN 30 (H) 07/10/2017 10:54 AM    Creatinine 1.40 (H) 07/10/2017 10:54 AM    BUN/Creatinine ratio 21 07/10/2017 10:54 AM    GFR est AA 39 (L) 07/10/2017 10:54 AM    GFR est non-AA 34 (L) 07/10/2017 10:54 AM    Calcium 9.6 07/10/2017 10:54 AM    Bilirubin, total 0.6 06/02/2009 04:00 PM    Alk. phosphatase 76 06/02/2009 04:00 PM    Protein, total 7.5 06/02/2009 04:00 PM    Albumin 4.2 06/02/2009 04:00 PM    Globulin 3.3 06/02/2009 04:00 PM    A-G Ratio 1.3 06/02/2009 04:00 PM    ALT (SGPT) 25 (L) 06/02/2009 04:00 PM           ASSESSMENT      1. Pacemaker  2. Diabetes  3. Hypertension  4. Dyslipidemia  5. Bradycardia       PLAN     Reviewed her device interrogation today which shows normal functioning with 99% RVP. She is hypertensive in office, but has forgotten to take her metoprolol today and will resume when she gets home. She denies cardiac complaints and is doing well with current medical therapy. FOLLOW-UP   1 year    Thank you, Kirk Walker MD and Dr. Kade Serrano for allowing me to participate in the care of this extraordinarily pleasant female. Please do not hesitate to contact me for further questions/concerns.      Ronni Chapman NP    [facility] 704    . [address], [address] [facility], [address] [facility]  [facility]    [place], [address]  [phone] / [phone] Fax   [phone] / [phone] Fax

## 2022-03-28 NOTE — LETTER
3/28/2022    Patient: Amparo Andrews   YOB: 1931   Date of Visit: 3/28/2022     Anthony Jones MD  [facility] [address]  Via Fax: [phone]    Dear Anthony Jones MD,      Thank you for referring Ms. Dia Choudhary to [facility] for evaluation. My notes for this consultation are attached. If you have questions, please do not hesitate to call me. I look forward to following your patient along with you.       Sincerely,    Tyler Dorsey NP

## 2022-07-06 ENCOUNTER — OFFICE VISIT (OUTPATIENT)
Dept: CARDIOLOGY CLINIC | Age: 87
End: 2022-07-06
Payer: MEDICARE

## 2022-07-06 DIAGNOSIS — Z95.0 CARDIAC PACEMAKER IN SITU: Primary | ICD-10-CM

## 2022-07-06 PROCEDURE — 93296 REM INTERROG EVL PM/IDS: CPT | Performed by: INTERNAL MEDICINE

## 2022-09-27 ENCOUNTER — TELEPHONE (OUTPATIENT)
Dept: CARDIOLOGY CLINIC | Age: 87
End: 2022-09-27

## 2022-09-27 NOTE — TELEPHONE ENCOUNTER
Patient called advising she received the letter stating that her transmission did not go through. She is going to send a manual transmission at 9:00am, and is requesting a call back whether the transmission is received or not.           YCFRB:[phone]

## 2022-09-29 NOTE — TELEPHONE ENCOUNTER
Returned call, ID verified using two patient identifiers   Advised that transmission was received on 9/29, discussed findings and provided date of next scheduled remote, confirmed with readback

## 2022-12-29 ENCOUNTER — OFFICE VISIT (OUTPATIENT)
Dept: CARDIOLOGY CLINIC | Age: 87
End: 2022-12-29
Payer: MEDICARE

## 2022-12-29 DIAGNOSIS — Z95.0 CARDIAC PACEMAKER IN SITU: Primary | ICD-10-CM

## 2023-01-04 ENCOUNTER — OFFICE VISIT (OUTPATIENT)
Dept: CARDIOLOGY CLINIC | Age: 88
End: 2023-01-04
Payer: MEDICARE

## 2023-01-04 VITALS
OXYGEN SATURATION: 95 % | BODY MASS INDEX: 23.32 KG/M2 | WEIGHT: 136.6 LBS | HEART RATE: 72 BPM | SYSTOLIC BLOOD PRESSURE: 132 MMHG | DIASTOLIC BLOOD PRESSURE: 80 MMHG | HEIGHT: 64 IN

## 2023-01-04 DIAGNOSIS — I35.0 MILD AORTIC STENOSIS: ICD-10-CM

## 2023-01-04 DIAGNOSIS — E78.00 PURE HYPERCHOLESTEROLEMIA: ICD-10-CM

## 2023-01-04 DIAGNOSIS — I10 ESSENTIAL HYPERTENSION: Primary | ICD-10-CM

## 2023-01-04 PROCEDURE — G0463 HOSPITAL OUTPT CLINIC VISIT: HCPCS | Performed by: SPECIALIST

## 2023-01-04 PROCEDURE — 93005 ELECTROCARDIOGRAM TRACING: CPT | Performed by: SPECIALIST

## 2023-01-04 RX ORDER — METFORMIN HYDROCHLORIDE 500 MG/1
500 TABLET ORAL 2 TIMES DAILY WITH MEALS
COMMUNITY
Start: 2022-11-21

## 2023-01-04 NOTE — PROGRESS NOTES
Akin Mchugh MD. [facility]              Patient: Roosevelt Melchor  : 1931      Today's Date: 2023            HISTORY OF PRESENT ILLNESS:     History of Present Illness:  Here for follow-up. No cardiac symptoms. No chest pain or SOB. No syncope of lightheadedness. PAST MEDICAL HISTORY:     Past Medical History:   Diagnosis Date    Central retinal artery occlusion      - lost 90% of vision of left eye    Coronary atherosclerosis of native coronary artery     adeno card : fixed inf defect, EF 47%. Echo : EF 55%, mild MR. Diabetes ([address]     HTN (hypertension)     Mild aortic stenosis     Mobitz (type) II atrioventricular block     Pure hypercholesterolemia        Past Surgical History:   Procedure Laterality Date    HX PACEMAKER      DDD, V lead Fx . HX PACEMAKER      Gen Change    HX PACEMAKER      Ventricular lead failure. No access through stenosed left subclavian. No access from right side due to stenosis of SVC-RA junction. MEDICATIONS:     Current Outpatient Medications   Medication Sig Dispense Refill    metFORMIN (GLUCOPHAGE) 500 mg tablet Take 500 mg by mouth two (2) times daily (with meals). fluticasone (FLONASE) 50 mcg/actuation nasal spray as needed. metoprolol succinate (TOPROL-XL) 100 mg tablet Take  by mouth daily. B.animalis,bifid,infantis,long 10-15 mg TbEC Take  by mouth daily. levothyroxine (SYNTHROID) 50 mcg tablet Take  by mouth Daily (before breakfast). cholecalciferol (VITAMIN D3) 25 mcg (1,000 unit) cap Take 1,000 Units by mouth daily. amLODIPine (NORVASC) 5 mg tablet Take 5 mg by mouth daily. aspirin delayed-release 81 mg tablet Take 81 mg by mouth daily. GLUCOSAMINE/CHONDROITIN SULF A (GLUCOSAMINE-CHONDROITIN PO) Take 2,000 mg by mouth.  (Patient not taking: Reported on 2023)         Allergies   Allergen Reactions    Lisinopril Rash     Face swelling    Simvastatin Unknown (comments)     Dry lips and mouth             SOCIAL HISTORY:     Social History     Tobacco Use    Smoking status: Former    Smokeless tobacco: Never   Substance Use Topics    Alcohol use: Yes     Alcohol/week: 0.0 standard drinks     Comment: wine twice yearly    Drug use: No         FAMILY HISTORY:     Family History   Problem Relation Age of Onset    Hypertension Mother            REVIEW OF SYSTEMS:        Review of Systems:    Constitutional: Negative for fever, chills    HEENT: + Vision loss    Respiratory: Negative for cough    Cardiovascular: Negative for orthopnea, syncope, and PND. Gastrointestinal: Negative for abdominal pain, diarrhea, or melena    Genitourinary: Negative for dysuria    Musculoskeletal: Negative for myalgias. Skin: Negative for rash    Heme: No problems bleeding. Neurological: Negative for speech change and focal weakness. + chronic constipation and urinary frequency                   PHYSICAL EXAM:        Physical Exam:   Visit Vitals  /80   Pulse 72   Ht 5' 4\" (1.626 m)   Wt 136 lb 9.6 oz (62 kg)   SpO2 95%   BMI 23.45 kg/m²          Patient appears generally well, mood and affect are appropriate and pleasant. HEENT: Normocephalic, atraumatic. Hearing intact. Sclera anicteric. Neck Exam: Supple, No JVD sitting up, + bilat neck bruits  Lung Exam: Clear to auscultation, even breath sounds. Cardiac Exam: Regular rate and rhythm with 3/6 systolic mumur. Nml S2  Abdomen: Soft, non-tender, normal bowel sounds. Extremities: No lower extremity edema.   MAW  Psych - appropriate affect   Neuro - non focal                       LABS / OTHER STUDIES:        Labs 2/13/18 - Cr 1.36, K 4.2, TSH 1.07, A1c 6.0   Labs 6/13/18 - CMP OK, chol 198, HDL 51, , tg 127, TSH 1.2, A1c 7.3,      Labs 8/21/18 - Cr 1.05, chol 178, HDL 42, , , A1c 5.9, TSH 1.2, LFT's OK,      Labs 5/8/20 - TSH and BMP Ok, A1c 6.3,     Labs 11/22 - CMP OK, A1c 5.9, chol 187, , , HDL: 46          CARDIAC DIAGNOSTICS:        Cardiac Evaluation Includes:  Carotid dopplers 6/23/14 - 10-49% stenosis bilat  Echo 6/3/15 - TDS - mod LVH, LVEF 55%, mod MAC and mild MR, mild AS (mean gradient 14), mild LAE  Lexiscan Cardiolite 12/1/16 - + breast attenuation, but no ischemia. LVEF 51%. Low risk findings. Echo 7/7/17 - TDS. Mod LVH. LVEF 55%. Mild-mod AS (mean gradient 19. LAST 1.4). Mild LAE. RVSP 40     Echo 12/19/18 - TDS. Definity. LVEF 50%. Mod LVH. Grade 1 diastology. Mild LAE. Mod MAC. Mild-mod AS (AV mean PG 14 mmHg). Carotid Doppler 12/19/18 - bilat 10-49% stenosis     Echo 9/30/20 - LVEF 50%, mild-mod AS     Echo 12/16/2021 - LVEF 50-55%,  Mild-mod AS (mean PG 18 mmHg)          EKG 6/3/15 - A sensed, V paced  EKG 6/22/16 - V paced   EKG 6/21/17 - V paced   EKG 6/13/18 - V paced   EKG 9/30/20 - V paced   EKG 1/4/23 - V paced                   ASSESSMENT AND PLAN:        Assessment and Plan:    1) HTN    - BP looks good - continue current regimen       2) Carotid Artery disease - mild    - Carotid dopplers 6/23/14 - 10-49% stenosis bilat      3) Dyslipidemia    - Given central retinal artery occlusion and atherosclerosis, I think a statin would be helpful here   - She declines a statin       4) History of high grade Av block s/p pacer    - denies cardiac complaints    - Continue pacer checks as usual       5) Mild-mod AS   - Echo 12/16/2021 - LVEF 50-55%,  Mild-mod AS (mean PG 18 mmHg)  - check an echo (next year is her preference)   - follow on serial studies      6) Central Retinal artery occlusion   - On 12/18 - Dr. Nirmal Anderson from Opth[facility] called. Patient has a central retinal artery occlusion in left eye - she has lost 90% of vision in left eye  - Echo 12/19/18 - with stable findings (see above)   - Carotid Doppler 12/19/18 - bilat 10-49% stenosis   - Per pacer clinic, no afib seen on device check - continue to follow      7) RTC in 12 months with an echo.   Patient expressed understanding of the plan - questions were answered. Lives with her daughter. Son in [facility]. Arian Dang MD, [address], [address]  [place] [address]  [address]  Afia Carrasco [address] [place] [address]  Ph: [phone]   Ph [phone]

## 2023-01-04 NOTE — PROGRESS NOTES
Chief Complaint   Patient presents with    Follow-up     annual    Hypertension    Cholesterol Problem       Vitals:    01/04/23 1350   BP: 132/80   Pulse: 72   Height: 5' 4\" (1.626 m)   Weight: 136 lb 9.6 oz (62 kg)   SpO2: 95%         Chest pain: no    SOB: no    Palpitations: no    Dizziness: no    Swelling: no    Refills:       Have you been to the ER, urgent care clinic since your last visit? Hospitalized since your last visit? Have you seen or consulted other health care providers outside of the [facility] since your last visit?  (Include any pap smears or colon screening.)

## 2023-03-28 ENCOUNTER — OFFICE VISIT (OUTPATIENT)
Dept: CARDIOLOGY CLINIC | Age: 88
End: 2023-03-28
Payer: MEDICARE

## 2023-03-28 DIAGNOSIS — Z95.0 CARDIAC PACEMAKER IN SITU: ICD-10-CM

## 2023-03-28 DIAGNOSIS — E78.00 PURE HYPERCHOLESTEROLEMIA: Primary | ICD-10-CM

## 2023-03-28 PROCEDURE — 93294 REM INTERROG EVL PM/LDLS PM: CPT | Performed by: INTERNAL MEDICINE

## 2023-07-07 ENCOUNTER — TELEPHONE (OUTPATIENT)
Age: 88
End: 2023-07-07

## 2023-07-21 ENCOUNTER — PROCEDURE VISIT (OUTPATIENT)
Age: 88
End: 2023-07-21
Payer: MEDICARE

## 2023-07-21 ENCOUNTER — OFFICE VISIT (OUTPATIENT)
Age: 88
End: 2023-07-21
Payer: MEDICARE

## 2023-07-21 VITALS
BODY MASS INDEX: 24.41 KG/M2 | HEART RATE: 64 BPM | SYSTOLIC BLOOD PRESSURE: 142 MMHG | HEIGHT: 64 IN | WEIGHT: 143 LBS | OXYGEN SATURATION: 95 % | DIASTOLIC BLOOD PRESSURE: 64 MMHG | RESPIRATION RATE: 16 BRPM

## 2023-07-21 DIAGNOSIS — Z95.0 PRESENCE OF CARDIAC PACEMAKER: Primary | ICD-10-CM

## 2023-07-21 DIAGNOSIS — I44.1 MOBITZ TYPE II ATRIOVENTRICULAR BLOCK: ICD-10-CM

## 2023-07-21 DIAGNOSIS — I10 PRIMARY HYPERTENSION: ICD-10-CM

## 2023-07-21 DIAGNOSIS — I35.0 MILD AORTIC STENOSIS: ICD-10-CM

## 2023-07-21 DIAGNOSIS — Z95.0 PACEMAKER: Primary | ICD-10-CM

## 2023-07-21 PROCEDURE — 99214 OFFICE O/P EST MOD 30 MIN: CPT | Performed by: NURSE PRACTITIONER

## 2023-07-21 PROCEDURE — 93280 PM DEVICE PROGR EVAL DUAL: CPT | Performed by: INTERNAL MEDICINE

## 2023-07-21 RX ORDER — PYRITHIONE ZINC 1 G/ML
2-3 LOTION/SHAMPOO TOPICAL DAILY
COMMUNITY

## 2024-01-04 ENCOUNTER — ANCILLARY PROCEDURE (OUTPATIENT)
Age: 89
End: 2024-01-04
Payer: MEDICARE

## 2024-01-04 VITALS
HEIGHT: 64 IN | HEART RATE: 61 BPM | WEIGHT: 143 LBS | BODY MASS INDEX: 24.41 KG/M2 | DIASTOLIC BLOOD PRESSURE: 72 MMHG | SYSTOLIC BLOOD PRESSURE: 130 MMHG

## 2024-01-04 DIAGNOSIS — I35.0 MILD AORTIC STENOSIS: ICD-10-CM

## 2024-01-04 LAB
ECHO AO ROOT DIAM: 3.2 CM
ECHO AO ROOT INDEX: 1.88 CM/M2
ECHO AV AREA PEAK VELOCITY: 0.9 CM2
ECHO AV AREA VTI: 0.9 CM2
ECHO AV AREA/BSA PEAK VELOCITY: 0.5 CM2/M2
ECHO AV AREA/BSA VTI: 0.5 CM2/M2
ECHO AV MEAN GRADIENT: 20 MMHG
ECHO AV MEAN VELOCITY: 2.1 M/S
ECHO AV PEAK GRADIENT: 37 MMHG
ECHO AV PEAK VELOCITY: 3 M/S
ECHO AV VELOCITY RATIO: 0.3
ECHO AV VTI: 67 CM
ECHO BSA: 1.71 M2
ECHO EST RA PRESSURE: 3 MMHG
ECHO LA VOL A-L A2C: 72 ML (ref 22–52)
ECHO LA VOL A-L A4C: 58 ML (ref 22–52)
ECHO LA VOL BP: 61 ML (ref 22–52)
ECHO LA VOL MOD A2C: 64 ML (ref 22–52)
ECHO LA VOL MOD A4C: 54 ML (ref 22–52)
ECHO LA VOL/BSA BIPLANE: 36 ML/M2 (ref 16–34)
ECHO LA VOLUME AREA LENGTH: 68 ML
ECHO LA VOLUME INDEX A-L A2C: 42 ML/M2 (ref 16–34)
ECHO LA VOLUME INDEX A-L A4C: 34 ML/M2 (ref 16–34)
ECHO LA VOLUME INDEX AREA LENGTH: 40 ML/M2 (ref 16–34)
ECHO LA VOLUME INDEX MOD A2C: 38 ML/M2 (ref 16–34)
ECHO LA VOLUME INDEX MOD A4C: 32 ML/M2 (ref 16–34)
ECHO LV E' LATERAL VELOCITY: 6 CM/S
ECHO LV E' SEPTAL VELOCITY: 3 CM/S
ECHO LV EDV A2C: 65 ML
ECHO LV EDV A4C: 71 ML
ECHO LV EDV BP: 69 ML (ref 56–104)
ECHO LV EDV INDEX A4C: 42 ML/M2
ECHO LV EDV INDEX BP: 41 ML/M2
ECHO LV EDV NDEX A2C: 38 ML/M2
ECHO LV EJECTION FRACTION A2C: 55 %
ECHO LV EJECTION FRACTION A4C: 53 %
ECHO LV EJECTION FRACTION BIPLANE: 52 % (ref 55–100)
ECHO LV ESV A2C: 29 ML
ECHO LV ESV A4C: 34 ML
ECHO LV ESV BP: 33 ML (ref 19–49)
ECHO LV ESV INDEX A2C: 17 ML/M2
ECHO LV ESV INDEX A4C: 20 ML/M2
ECHO LV ESV INDEX BP: 19 ML/M2
ECHO LV FRACTIONAL SHORTENING: 30 % (ref 28–44)
ECHO LV INTERNAL DIMENSION DIASTOLE INDEX: 2.71 CM/M2
ECHO LV INTERNAL DIMENSION DIASTOLIC: 4.6 CM (ref 3.9–5.3)
ECHO LV INTERNAL DIMENSION SYSTOLIC INDEX: 1.88 CM/M2
ECHO LV INTERNAL DIMENSION SYSTOLIC: 3.2 CM
ECHO LV IVSD: 1.5 CM (ref 0.6–0.9)
ECHO LV MASS 2D: 256.8 G (ref 67–162)
ECHO LV MASS INDEX 2D: 151 G/M2 (ref 43–95)
ECHO LV POSTERIOR WALL DIASTOLIC: 1.3 CM (ref 0.6–0.9)
ECHO LV RELATIVE WALL THICKNESS RATIO: 0.57
ECHO LVOT AREA: 2.8 CM2
ECHO LVOT AV VTI INDEX: 0.31
ECHO LVOT DIAM: 1.9 CM
ECHO LVOT MEAN GRADIENT: 2 MMHG
ECHO LVOT PEAK GRADIENT: 4 MMHG
ECHO LVOT PEAK VELOCITY: 0.9 M/S
ECHO LVOT STROKE VOLUME INDEX: 35 ML/M2
ECHO LVOT SV: 59.5 ML
ECHO LVOT VTI: 21 CM
ECHO MV A VELOCITY: 1.5 M/S
ECHO MV AREA PHT: 2.9 CM2
ECHO MV E DECELERATION TIME (DT): 257.3 MS
ECHO MV E VELOCITY: 0.81 M/S
ECHO MV E/A RATIO: 0.54
ECHO MV E/E' LATERAL: 13.5
ECHO MV E/E' RATIO (AVERAGED): 20.25
ECHO MV PRESSURE HALF TIME (PHT): 74.6 MS
ECHO RIGHT VENTRICULAR SYSTOLIC PRESSURE (RVSP): 36 MMHG
ECHO RV FREE WALL PEAK S': 13 CM/S
ECHO RV INTERNAL DIMENSION: 3.9 CM
ECHO RV TAPSE: 1.7 CM (ref 1.7–?)
ECHO TV REGURGITANT MAX VELOCITY: 2.88 M/S
ECHO TV REGURGITANT PEAK GRADIENT: 33 MMHG

## 2024-01-04 PROCEDURE — 93306 TTE W/DOPPLER COMPLETE: CPT | Performed by: SPECIALIST

## 2024-02-07 ENCOUNTER — OFFICE VISIT (OUTPATIENT)
Age: 89
End: 2024-02-07
Payer: MEDICARE

## 2024-02-07 VITALS
WEIGHT: 154.8 LBS | HEART RATE: 74 BPM | HEIGHT: 64 IN | DIASTOLIC BLOOD PRESSURE: 80 MMHG | SYSTOLIC BLOOD PRESSURE: 136 MMHG | BODY MASS INDEX: 26.43 KG/M2 | OXYGEN SATURATION: 97 %

## 2024-02-07 DIAGNOSIS — I10 PRIMARY HYPERTENSION: Primary | ICD-10-CM

## 2024-02-07 DIAGNOSIS — E11.51 TYPE 2 DIABETES MELLITUS WITH PERIPHERAL VASCULAR DISEASE (HCC): ICD-10-CM

## 2024-02-07 DIAGNOSIS — I35.0 NONRHEUMATIC AORTIC (VALVE) STENOSIS: ICD-10-CM

## 2024-02-07 PROCEDURE — 99214 OFFICE O/P EST MOD 30 MIN: CPT | Performed by: SPECIALIST

## 2024-02-07 PROCEDURE — 93005 ELECTROCARDIOGRAM TRACING: CPT | Performed by: SPECIALIST

## 2024-02-07 NOTE — PATIENT INSTRUCTIONS
[email or web address] and enter H967 to learn more about \"DASH Diet: Care Instructions.\"  Current as of: September 20, 2023               Content Version: 13.9  © 1357-8856 [employer], [employer].   Care instructions adapted under license by [employer]. If you have questions about a medical condition or this instruction, always ask your healthcare professional. Healthwise[employer] disclaims any warranty or liability for your use of this information.

## 2024-02-07 NOTE — PROGRESS NOTES
Chief Complaint   Patient presents with    Hypertension    Hyperlipidemia     Vitals:    02/07/24 1415   Weight: 70.2 kg (154 lb 12.8 oz)   Height: 1.626 m (5' 4\")     No active chest pain   No recent hospitalizations/urgent care visits  No refills needed

## 2024-02-07 NOTE — PROGRESS NOTES
Sekou Dyson MD. [facility]          Patient: Tiera Iraheta  : 1931      Today's Date: 2024        HISTORY OF PRESENT ILLNESS:     History of Present Illness:    She is doing OK - no new complaints.  No CP or sig SOB.  Uses a walker.  Class 2 MICHAELS.  Still walks stairs.       PAST MEDICAL HISTORY:     Past Medical History:   Diagnosis Date    Central retinal artery occlusion      - lost 90% of vision of left eye    Coronary atherosclerosis of native coronary artery     adeno card 2008: fixed inf defect, EF 47%. Echo : EF 55%, mild MR.    Diabetes (HCC)     HTN (hypertension)     Mild aortic stenosis     Mobitz (type) II atrioventricular block     Pure hypercholesterolemia        Past Surgical History:   Procedure Laterality Date    PACEMAKER  2009    Ventricular lead failure. No access through stenosed left subclavian. No access from right side due to stenosis of SVC-RA junction.     PACEMAKER  2008    DDD, V lead Fx .    PACEMAKER      Gen Change             CURRENT MEDICATIONS:    .  Current Outpatient Medications   Medication Sig Dispense Refill    NONFORMULARY 1 capsule daily Prebiotic and probiotic      Metamucil Fiber CHEW Take 2-3 tablets by mouth daily      amLODIPine (NORVASC) 5 MG tablet Take 1 tablet by mouth daily      aspirin 81 MG EC tablet Take 1 tablet by mouth daily      vitamin D 25 MCG (1000 UT) CAPS Take 1 capsule by mouth daily Usually 1-2 times per week      fluticasone (FLONASE) 50 MCG/ACT nasal spray as needed      levothyroxine (SYNTHROID) 50 MCG tablet Take by mouth every morning (before breakfast)      metFORMIN (GLUCOPHAGE) 500 MG tablet Take 1 tablet by mouth daily (with breakfast)      metoprolol succinate (TOPROL XL) 100 MG extended release tablet Take by mouth daily       No current facility-administered medications for this visit.       Allergies   Allergen Reactions    Simvastatin      Other reaction(s): Unknown (comments)  Dry lips and mouth    Lisinopril

## 2024-07-24 ENCOUNTER — PROCEDURE VISIT (OUTPATIENT)
Age: 89
End: 2024-07-24
Payer: MEDICARE

## 2024-07-24 ENCOUNTER — OFFICE VISIT (OUTPATIENT)
Age: 89
End: 2024-07-24
Payer: MEDICARE

## 2024-07-24 VITALS
OXYGEN SATURATION: 97 % | HEART RATE: 64 BPM | DIASTOLIC BLOOD PRESSURE: 78 MMHG | WEIGHT: 139 LBS | SYSTOLIC BLOOD PRESSURE: 138 MMHG | HEIGHT: 64 IN | BODY MASS INDEX: 23.73 KG/M2

## 2024-07-24 DIAGNOSIS — I10 PRIMARY HYPERTENSION: ICD-10-CM

## 2024-07-24 DIAGNOSIS — Z95.0 PACEMAKER: Primary | ICD-10-CM

## 2024-07-24 DIAGNOSIS — I35.0 MILD AORTIC STENOSIS: ICD-10-CM

## 2024-07-24 DIAGNOSIS — I44.1 MOBITZ TYPE II ATRIOVENTRICULAR BLOCK: Primary | ICD-10-CM

## 2024-07-24 DIAGNOSIS — E78.00 PURE HYPERCHOLESTEROLEMIA: ICD-10-CM

## 2024-07-24 DIAGNOSIS — E11.51 TYPE 2 DIABETES MELLITUS WITH PERIPHERAL VASCULAR DISEASE (HCC): ICD-10-CM

## 2024-07-24 PROCEDURE — 99214 OFFICE O/P EST MOD 30 MIN: CPT | Performed by: INTERNAL MEDICINE

## 2024-07-24 PROCEDURE — G8427 DOCREV CUR MEDS BY ELIG CLIN: HCPCS | Performed by: INTERNAL MEDICINE

## 2024-07-24 PROCEDURE — 1123F ACP DISCUSS/DSCN MKR DOCD: CPT | Performed by: INTERNAL MEDICINE

## 2024-07-24 PROCEDURE — 93280 PM DEVICE PROGR EVAL DUAL: CPT | Performed by: INTERNAL MEDICINE

## 2024-07-24 PROCEDURE — 1036F TOBACCO NON-USER: CPT | Performed by: INTERNAL MEDICINE

## 2024-07-24 PROCEDURE — 1090F PRES/ABSN URINE INCON ASSESS: CPT | Performed by: INTERNAL MEDICINE

## 2024-07-24 PROCEDURE — G8420 CALC BMI NORM PARAMETERS: HCPCS | Performed by: INTERNAL MEDICINE

## 2024-07-24 NOTE — PROGRESS NOTES
Chief Complaint   Patient presents with    Annual Exam     ppm     Vitals:    07/24/24 1133   BP: 138/78   Site: Left Upper Arm   Position: Sitting   Pulse: 64   SpO2: 97%   Weight: 63 kg (139 lb)   Height: 1.626 m (5' 4\")       Chest pain: DENIED     Recent hospital stays: DENIED     Refills: DENIED

## 2024-07-24 NOTE — PATIENT INSTRUCTIONS
Continue remote transmissions every 3 months  FU with NP in 1 year  FU with Dr. Alan 2 years  MANUAL SEND DATES:    11/1/24 2/1/25 5/1/25 8/1/25 11/1/25

## 2024-07-24 NOTE — PROGRESS NOTES
Cardiac Electrophysiology OFFICE Follow Up Note             Assessment/Plan:   1. Mobitz type II atrioventricular block  2. Type 2 diabetes mellitus with peripheral vascular disease (HCC)  3. Mild aortic stenosis  4. Primary hypertension  5. Pure hypercholesterolemia           Complete Heart Block/Medtronic dual chamber Pacemaker   22 months on battery   61.6% A-paced  99% V-paced  No AF  No VT  - Continue Q3 month remote device transmissions. Follow-up in the EP clinic in one year, or sooner for any concerns.     Hypertension   - BP controlled  - Continue metoprolol and amlodipine     Aortic stenosis  - Echo and Dr. Dyson in January     Central retinal artery occlusion, left  - No AF  - Statin therapy and aspirin          Subjective:         Tiera Iraheta is a 89 y.o. patient who is seen for follow up of her pacemaker.     She feels well overall and denies chest pain, dyspnea, dizziness or syncope. No hospitatlization.     I independently review internal and external records of most recent labs, EKGs, event monitors or Holters, and imaging including most recent echocardiograms and stress test.  Ordered follow up testing as indicated in orders, patient instructions. Previous notes from consultants and PCP are reviewed as well as pertinent prior admission documents.        Patient Active Problem List   Diagnosis    Diabetes (HCC)    Mobitz type II atrioventricular block    Mild aortic stenosis    Pure hypercholesterolemia    Type 2 diabetes mellitus with peripheral vascular disease (HCC)    HTN (hypertension)    Central retinal artery occlusion    Type 2 diabetes mellitus with proliferative retinopathy (HCC)         Current Outpatient Medications   Medication Sig Dispense Refill    NONFORMULARY 1 capsule daily Prebiotic and probiotic      Metamucil Fiber CHEW Take 2-3 tablets by mouth daily      amLODIPine (NORVASC) 5 MG tablet Take 1 tablet by mouth daily      aspirin 81 MG EC tablet Take 1 tablet by mouth

## 2024-08-17 ENCOUNTER — HOSPITAL ENCOUNTER (EMERGENCY)
Facility: HOSPITAL | Age: 89
Discharge: HOME | End: 2024-08-17
Attending: EMERGENCY MEDICINE
Payer: MEDICARE

## 2024-08-17 ENCOUNTER — APPOINTMENT (OUTPATIENT)
Facility: HOSPITAL | Age: 89
End: 2024-08-17
Payer: MEDICARE

## 2024-08-17 VITALS
SYSTOLIC BLOOD PRESSURE: 181 MMHG | TEMPERATURE: 97.8 F | OXYGEN SATURATION: 96 % | RESPIRATION RATE: 16 BRPM | WEIGHT: 160 LBS | HEIGHT: 62 IN | HEART RATE: 84 BPM | DIASTOLIC BLOOD PRESSURE: 74 MMHG | BODY MASS INDEX: 29.44 KG/M2

## 2024-08-17 DIAGNOSIS — S39.012A STRAIN OF LUMBAR REGION, INITIAL ENCOUNTER: Primary | ICD-10-CM

## 2024-08-17 LAB
APPEARANCE UR: CLEAR
BACTERIA URNS QL MICRO: NEGATIVE /HPF
BILIRUB UR QL: NEGATIVE
COLOR UR: NORMAL
EPITH CASTS URNS QL MICRO: NORMAL /LPF
GLUCOSE UR STRIP.AUTO-MCNC: NEGATIVE MG/DL
HGB UR QL STRIP: NEGATIVE
KETONES UR QL STRIP.AUTO: NEGATIVE MG/DL
LEUKOCYTE ESTERASE UR QL STRIP.AUTO: NEGATIVE
NITRITE UR QL STRIP.AUTO: NEGATIVE
PH UR STRIP: 6 (ref 5–8)
PROT UR STRIP-MCNC: NEGATIVE MG/DL
RBC #/AREA URNS HPF: NORMAL /HPF
SP GR UR REFRACTOMETRY: 1.02 (ref 1–1.03)
URINE CULTURE IF INDICATED: NORMAL
UROBILINOGEN UR QL STRIP.AUTO: 0.2 EU/DL (ref 0.2–1)
WBC URNS QL MICRO: NORMAL /HPF (ref 0–4)

## 2024-08-17 PROCEDURE — 99284 EMERGENCY DEPT VISIT MOD MDM: CPT

## 2024-08-17 PROCEDURE — 74176 CT ABD & PELVIS W/O CONTRAST: CPT

## 2024-08-17 PROCEDURE — 81001 URINALYSIS AUTO W/SCOPE: CPT

## 2024-08-17 RX ORDER — NAPROXEN 250 MG/1
250 TABLET ORAL EVERY 12 HOURS PRN
Qty: 20 TABLET | Refills: 0 | Status: SHIPPED | OUTPATIENT
Start: 2024-08-17

## 2024-08-17 ASSESSMENT — PAIN - FUNCTIONAL ASSESSMENT: PAIN_FUNCTIONAL_ASSESSMENT: NONE - DENIES PAIN

## 2024-08-17 ASSESSMENT — LIFESTYLE VARIABLES
HOW MANY STANDARD DRINKS CONTAINING ALCOHOL DO YOU HAVE ON A TYPICAL DAY: PATIENT DOES NOT DRINK
HOW OFTEN DO YOU HAVE A DRINK CONTAINING ALCOHOL: NEVER

## 2024-08-17 NOTE — ED PROVIDER NOTES
[facility] EMERGENCY DEPT  EMERGENCY DEPARTMENT ENCOUNTER      Pt Name: Tiera Iraheta  MRN: [id number]  Birthdate 7/8/1931  Date of evaluation: 8/17/2024  Provider: Spencer Slater MD      HISTORY OF PRESENT ILLNESS      89-year-old female with history of diabetes and hypertension presents to the emergency department by EMS with chief complaint of left flank pain.  She reports that she injured her back about a week ago pulling up on a bar and has hurt ever since.  She has some frequency.  No dysuria.  No fever.    The history is provided by the patient, medical records and the EMS personnel.           Nursing Notes were reviewed.    REVIEW OF SYSTEMS         Review of Systems        PAST MEDICAL HISTORY     Past Medical History:   Diagnosis Date    Central retinal artery occlusion     12/18 - lost 90% of vision of left eye    Coronary atherosclerosis of native coronary artery     adeno card 2008: fixed inf defect, EF 47%. Echo 2008: EF 55%, mild MR.    Diabetes (HCC)     HTN (hypertension)     Mild aortic stenosis     Mobitz (type) II atrioventricular block     Pure hypercholesterolemia          SURGICAL HISTORY       Past Surgical History:   Procedure Laterality Date    PACEMAKER  2009    Ventricular lead failure. No access through stenosed left subclavian. No access from right side due to stenosis of SVC-RA junction.     PACEMAKER  2008    DDD, V lead Fx 2009.    PACEMAKER  2008    Gen Change         CURRENT MEDICATIONS       Previous Medications    AMLODIPINE (NORVASC) 5 MG TABLET    Take 1 tablet by mouth daily    ASPIRIN 81 MG EC TABLET    Take 1 tablet by mouth daily    FLUTICASONE (FLONASE) 50 MCG/ACT NASAL SPRAY    as needed    LEVOTHYROXINE (SYNTHROID) 50 MCG TABLET    Take by mouth every morning (before breakfast)    METAMUCIL FIBER CHEW    Take 2-3 tablets by mouth daily    METFORMIN (GLUCOPHAGE) 500 MG TABLET    Take 1 tablet by mouth daily (with breakfast)    METOPROLOL SUCCINATE (TOPROL XL) 100 MG

## 2024-08-17 NOTE — ED TRIAGE NOTES
Pt ambulatory with walker in ED with c/o left lower back pain x 1 week. Pt said she was pulling up on a bar and it has been hurting since then on and off and more with movement. Pt said she also has some urinary frequency.   
The patient is a 56y Male complaining of chest pain.

## 2024-11-24 PROCEDURE — 93294 REM INTERROG EVL PM/LDLS PM: CPT | Performed by: INTERNAL MEDICINE

## 2024-12-09 ENCOUNTER — APPOINTMENT (OUTPATIENT)
Facility: HOSPITAL | Age: 88
End: 2024-12-09
Attending: STUDENT IN AN ORGANIZED HEALTH CARE EDUCATION/TRAINING PROGRAM
Payer: MEDICARE

## 2024-12-09 ENCOUNTER — HOSPITAL ENCOUNTER (OUTPATIENT)
Facility: HOSPITAL | Age: 88
Setting detail: OBSERVATION
LOS: 1 days | Discharge: HOME | End: 2024-12-10
Attending: EMERGENCY MEDICINE | Admitting: STUDENT IN AN ORGANIZED HEALTH CARE EDUCATION/TRAINING PROGRAM
Payer: MEDICARE

## 2024-12-09 ENCOUNTER — APPOINTMENT (OUTPATIENT)
Facility: HOSPITAL | Age: 88
End: 2024-12-09
Payer: MEDICARE

## 2024-12-09 DIAGNOSIS — R79.89 ELEVATED TROPONIN: ICD-10-CM

## 2024-12-09 DIAGNOSIS — R06.02 SHORTNESS OF BREATH: ICD-10-CM

## 2024-12-09 DIAGNOSIS — I50.9 CONGESTIVE HEART FAILURE, UNSPECIFIED HF CHRONICITY, UNSPECIFIED HEART FAILURE TYPE (HCC): Primary | ICD-10-CM

## 2024-12-09 LAB
ALBUMIN SERPL-MCNC: 4 G/DL (ref 3.5–5)
ALBUMIN/GLOB SERPL: 1.2 (ref 1.1–2.2)
ALP SERPL-CCNC: 57 U/L (ref 45–117)
ALT SERPL-CCNC: 19 U/L (ref 12–78)
ANION GAP SERPL CALC-SCNC: 9 MMOL/L (ref 2–12)
AST SERPL-CCNC: 19 U/L (ref 15–37)
BASOPHILS # BLD: 0.1 K/UL (ref 0–0.1)
BASOPHILS NFR BLD: 1 % (ref 0–1)
BILIRUB SERPL-MCNC: 0.8 MG/DL (ref 0.2–1)
BUN SERPL-MCNC: 24 MG/DL (ref 6–20)
BUN/CREAT SERPL: 17 (ref 12–20)
CALCIUM SERPL-MCNC: 9.6 MG/DL (ref 8.5–10.1)
CHLORIDE SERPL-SCNC: 107 MMOL/L (ref 97–108)
CO2 SERPL-SCNC: 23 MMOL/L (ref 21–32)
COMMENT:: NORMAL
CREAT SERPL-MCNC: 1.4 MG/DL (ref 0.55–1.02)
DIFFERENTIAL METHOD BLD: ABNORMAL
ECHO AV AREA PEAK VELOCITY: 0.9 CM2
ECHO AV AREA VTI: 0.9 CM2
ECHO AV AREA/BSA PEAK VELOCITY: 0.5 CM2/M2
ECHO AV AREA/BSA VTI: 0.5 CM2/M2
ECHO AV MEAN GRADIENT: 18 MMHG
ECHO AV MEAN VELOCITY: 2 M/S
ECHO AV PEAK GRADIENT: 31 MMHG
ECHO AV PEAK VELOCITY: 2.8 M/S
ECHO AV VELOCITY RATIO: 0.29
ECHO AV VTI: 63.5 CM
ECHO BSA: 1.78 M2
ECHO LA DIAMETER INDEX: 2.47 CM/M2
ECHO LA DIAMETER: 4.3 CM
ECHO LA VOL A-L A2C: 69 ML (ref 22–52)
ECHO LA VOL MOD A2C: 63 ML (ref 22–52)
ECHO LA VOLUME INDEX A-L A2C: 40 ML/M2 (ref 16–34)
ECHO LA VOLUME INDEX MOD A2C: 36 ML/M2 (ref 16–34)
ECHO LV E' LATERAL VELOCITY: 2.25 CM/S
ECHO LV E' SEPTAL VELOCITY: 2.67 CM/S
ECHO LV EDV A2C: 71 ML
ECHO LV EDV A4C: 106 ML
ECHO LV EDV BP: 89 ML (ref 56–104)
ECHO LV EDV INDEX A4C: 61 ML/M2
ECHO LV EDV INDEX BP: 51 ML/M2
ECHO LV EDV NDEX A2C: 41 ML/M2
ECHO LV EF PHYSICIAN: 40 %
ECHO LV EJECTION FRACTION A2C: 51 %
ECHO LV EJECTION FRACTION A4C: 52 %
ECHO LV ESV A2C: 35 ML
ECHO LV ESV A4C: 50 ML
ECHO LV ESV BP: 43 ML (ref 19–49)
ECHO LV ESV INDEX A2C: 20 ML/M2
ECHO LV ESV INDEX A4C: 29 ML/M2
ECHO LV ESV INDEX BP: 25 ML/M2
ECHO LVOT AREA: 3.5 CM2
ECHO LVOT AV VTI INDEX: 0.26
ECHO LVOT DIAM: 2.1 CM
ECHO LVOT MEAN GRADIENT: 1 MMHG
ECHO LVOT PEAK GRADIENT: 2 MMHG
ECHO LVOT PEAK VELOCITY: 0.8 M/S
ECHO LVOT STROKE VOLUME INDEX: 32.4 ML/M2
ECHO LVOT SV: 56.4 ML
ECHO LVOT VTI: 16.3 CM
ECHO MV A VELOCITY: 1.28 M/S
ECHO MV AREA VTI: 2.1 CM2
ECHO MV E DECELERATION TIME (DT): 252.2 MS
ECHO MV E VELOCITY: 0.68 M/S
ECHO MV E/A RATIO: 0.53
ECHO MV E/E' LATERAL: 30.22
ECHO MV E/E' RATIO (AVERAGED): 27.85
ECHO MV E/E' SEPTAL: 25.47
ECHO MV LVOT VTI INDEX: 1.68
ECHO MV MAX VELOCITY: 1.4 M/S
ECHO MV MEAN GRADIENT: 2 MMHG
ECHO MV MEAN VELOCITY: 0.7 M/S
ECHO MV PEAK GRADIENT: 7 MMHG
ECHO MV VTI: 27.4 CM
ECHO TV REGURGITANT MAX VELOCITY: 2.51 M/S
ECHO TV REGURGITANT PEAK GRADIENT: 25 MMHG
EOSINOPHIL # BLD: 0.3 K/UL (ref 0–0.4)
EOSINOPHIL NFR BLD: 4 % (ref 0–7)
ERYTHROCYTE [DISTWIDTH] IN BLOOD BY AUTOMATED COUNT: 14.1 % (ref 11.5–14.5)
FERRITIN SERPL-MCNC: 25 NG/ML (ref 26–388)
GLOBULIN SER CALC-MCNC: 3.4 G/DL (ref 2–4)
GLUCOSE BLD STRIP.AUTO-MCNC: 130 MG/DL (ref 65–117)
GLUCOSE BLD STRIP.AUTO-MCNC: 244 MG/DL (ref 65–117)
GLUCOSE SERPL-MCNC: 195 MG/DL (ref 65–100)
HCT VFR BLD AUTO: 35.5 % (ref 35–47)
HGB BLD-MCNC: 12.4 G/DL (ref 11.5–16)
IMM GRANULOCYTES # BLD AUTO: 0.1 K/UL (ref 0–0.04)
IMM GRANULOCYTES NFR BLD AUTO: 1 % (ref 0–0.5)
IRON SATN MFR SERPL: 20 % (ref 20–50)
IRON SERPL-MCNC: 62 UG/DL (ref 35–150)
LYMPHOCYTES # BLD: 1.9 K/UL (ref 0.8–3.5)
LYMPHOCYTES NFR BLD: 29 % (ref 12–49)
MAGNESIUM SERPL-MCNC: 2 MG/DL (ref 1.6–2.4)
MCH RBC QN AUTO: 27.4 PG (ref 26–34)
MCHC RBC AUTO-ENTMCNC: 34.9 G/DL (ref 30–36.5)
MCV RBC AUTO: 78.5 FL (ref 80–99)
MONOCYTES # BLD: 0.7 K/UL (ref 0–1)
MONOCYTES NFR BLD: 10 % (ref 5–13)
NEUTS SEG # BLD: 3.5 K/UL (ref 1.8–8)
NEUTS SEG NFR BLD: 55 % (ref 32–75)
NRBC # BLD: 0 K/UL (ref 0–0.01)
NRBC BLD-RTO: 0 PER 100 WBC
NT PRO BNP: 2021 PG/ML
PLATELET # BLD AUTO: 223 K/UL (ref 150–400)
PMV BLD AUTO: 12 FL (ref 8.9–12.9)
POTASSIUM SERPL-SCNC: 4.2 MMOL/L (ref 3.5–5.1)
PROT SERPL-MCNC: 7.4 G/DL (ref 6.4–8.2)
RBC # BLD AUTO: 4.52 M/UL (ref 3.8–5.2)
SERVICE CMNT-IMP: ABNORMAL
SERVICE CMNT-IMP: ABNORMAL
SODIUM SERPL-SCNC: 139 MMOL/L (ref 136–145)
SPECIMEN HOLD: NORMAL
T4 FREE SERPL-MCNC: 1.2 NG/DL (ref 0.8–1.5)
TIBC SERPL-MCNC: 306 UG/DL (ref 250–450)
TROPONIN I SERPL HS-MCNC: 81 NG/L (ref 0–51)
TROPONIN I SERPL HS-MCNC: 86 NG/L (ref 0–51)
TSH SERPL DL<=0.05 MIU/L-ACNC: 1.53 UIU/ML (ref 0.36–3.74)
WBC # BLD AUTO: 6.5 K/UL (ref 3.6–11)

## 2024-12-09 PROCEDURE — 6360000002 HC RX W HCPCS: Performed by: STUDENT IN AN ORGANIZED HEALTH CARE EDUCATION/TRAINING PROGRAM

## 2024-12-09 PROCEDURE — 93005 ELECTROCARDIOGRAM TRACING: CPT | Performed by: EMERGENCY MEDICINE

## 2024-12-09 PROCEDURE — 83550 IRON BINDING TEST: CPT

## 2024-12-09 PROCEDURE — G0378 HOSPITAL OBSERVATION PER HR: HCPCS

## 2024-12-09 PROCEDURE — 36415 COLL VENOUS BLD VENIPUNCTURE: CPT

## 2024-12-09 PROCEDURE — 82962 GLUCOSE BLOOD TEST: CPT

## 2024-12-09 PROCEDURE — 85025 COMPLETE CBC W/AUTO DIFF WBC: CPT

## 2024-12-09 PROCEDURE — 80053 COMPREHEN METABOLIC PANEL: CPT

## 2024-12-09 PROCEDURE — 71046 X-RAY EXAM CHEST 2 VIEWS: CPT

## 2024-12-09 PROCEDURE — 93306 TTE W/DOPPLER COMPLETE: CPT

## 2024-12-09 PROCEDURE — 96372 THER/PROPH/DIAG INJ SC/IM: CPT

## 2024-12-09 PROCEDURE — 82728 ASSAY OF FERRITIN: CPT

## 2024-12-09 PROCEDURE — 84484 ASSAY OF TROPONIN QUANT: CPT

## 2024-12-09 PROCEDURE — 83735 ASSAY OF MAGNESIUM: CPT

## 2024-12-09 PROCEDURE — 83540 ASSAY OF IRON: CPT

## 2024-12-09 PROCEDURE — 99223 1ST HOSP IP/OBS HIGH 75: CPT | Performed by: SPECIALIST

## 2024-12-09 PROCEDURE — 84439 ASSAY OF FREE THYROXINE: CPT

## 2024-12-09 PROCEDURE — 6370000000 HC RX 637 (ALT 250 FOR IP): Performed by: STUDENT IN AN ORGANIZED HEALTH CARE EDUCATION/TRAINING PROGRAM

## 2024-12-09 PROCEDURE — APPSS30 APP SPLIT SHARED TIME 16-30 MINUTES: Performed by: NURSE PRACTITIONER

## 2024-12-09 PROCEDURE — 93306 TTE W/DOPPLER COMPLETE: CPT | Performed by: SPECIALIST

## 2024-12-09 PROCEDURE — 6360000002 HC RX W HCPCS: Performed by: EMERGENCY MEDICINE

## 2024-12-09 PROCEDURE — 99285 EMERGENCY DEPT VISIT HI MDM: CPT

## 2024-12-09 PROCEDURE — 84443 ASSAY THYROID STIM HORMONE: CPT

## 2024-12-09 PROCEDURE — 83880 ASSAY OF NATRIURETIC PEPTIDE: CPT

## 2024-12-09 PROCEDURE — 2580000003 HC RX 258: Performed by: STUDENT IN AN ORGANIZED HEALTH CARE EDUCATION/TRAINING PROGRAM

## 2024-12-09 RX ORDER — ONDANSETRON 4 MG/1
4 TABLET, ORALLY DISINTEGRATING ORAL EVERY 8 HOURS PRN
Status: DISCONTINUED | OUTPATIENT
Start: 2024-12-09 | End: 2024-12-09

## 2024-12-09 RX ORDER — SODIUM CHLORIDE 9 MG/ML
INJECTION, SOLUTION INTRAVENOUS PRN
Status: DISCONTINUED | OUTPATIENT
Start: 2024-12-09 | End: 2024-12-10 | Stop reason: HOSPADM

## 2024-12-09 RX ORDER — ACETAMINOPHEN 650 MG/1
650 SUPPOSITORY RECTAL EVERY 6 HOURS PRN
Status: DISCONTINUED | OUTPATIENT
Start: 2024-12-09 | End: 2024-12-10 | Stop reason: HOSPADM

## 2024-12-09 RX ORDER — POLYETHYLENE GLYCOL 3350 17 G/17G
17 POWDER, FOR SOLUTION ORAL DAILY PRN
Status: DISCONTINUED | OUTPATIENT
Start: 2024-12-09 | End: 2024-12-10 | Stop reason: HOSPADM

## 2024-12-09 RX ORDER — METOPROLOL SUCCINATE 50 MG/1
50 TABLET, EXTENDED RELEASE ORAL DAILY
Status: DISCONTINUED | OUTPATIENT
Start: 2024-12-10 | End: 2024-12-10 | Stop reason: HOSPADM

## 2024-12-09 RX ORDER — FUROSEMIDE 10 MG/ML
20 INJECTION INTRAMUSCULAR; INTRAVENOUS DAILY
Status: DISCONTINUED | OUTPATIENT
Start: 2024-12-10 | End: 2024-12-10

## 2024-12-09 RX ORDER — AMLODIPINE BESYLATE 5 MG/1
5 TABLET ORAL DAILY
Status: DISCONTINUED | OUTPATIENT
Start: 2024-12-10 | End: 2024-12-10

## 2024-12-09 RX ORDER — ENOXAPARIN SODIUM 100 MG/ML
30 INJECTION SUBCUTANEOUS DAILY
Status: DISCONTINUED | OUTPATIENT
Start: 2024-12-09 | End: 2024-12-10 | Stop reason: HOSPADM

## 2024-12-09 RX ORDER — ONDANSETRON 2 MG/ML
4 INJECTION INTRAMUSCULAR; INTRAVENOUS EVERY 6 HOURS PRN
Status: DISCONTINUED | OUTPATIENT
Start: 2024-12-09 | End: 2024-12-09

## 2024-12-09 RX ORDER — SODIUM CHLORIDE 0.9 % (FLUSH) 0.9 %
5-40 SYRINGE (ML) INJECTION PRN
Status: DISCONTINUED | OUTPATIENT
Start: 2024-12-09 | End: 2024-12-10 | Stop reason: HOSPADM

## 2024-12-09 RX ORDER — LEVOTHYROXINE SODIUM 75 UG/1
75 TABLET ORAL
Status: DISCONTINUED | OUTPATIENT
Start: 2024-12-10 | End: 2024-12-10 | Stop reason: HOSPADM

## 2024-12-09 RX ORDER — INSULIN LISPRO 100 [IU]/ML
0-4 INJECTION, SOLUTION INTRAVENOUS; SUBCUTANEOUS
Status: DISCONTINUED | OUTPATIENT
Start: 2024-12-09 | End: 2024-12-10 | Stop reason: HOSPADM

## 2024-12-09 RX ORDER — ACETAMINOPHEN 325 MG/1
650 TABLET ORAL EVERY 6 HOURS PRN
Status: DISCONTINUED | OUTPATIENT
Start: 2024-12-09 | End: 2024-12-10 | Stop reason: HOSPADM

## 2024-12-09 RX ORDER — ASPIRIN 81 MG/1
81 TABLET ORAL DAILY
Status: DISCONTINUED | OUTPATIENT
Start: 2024-12-09 | End: 2024-12-10 | Stop reason: HOSPADM

## 2024-12-09 RX ORDER — SODIUM CHLORIDE 0.9 % (FLUSH) 0.9 %
5-40 SYRINGE (ML) INJECTION EVERY 12 HOURS SCHEDULED
Status: DISCONTINUED | OUTPATIENT
Start: 2024-12-09 | End: 2024-12-10 | Stop reason: HOSPADM

## 2024-12-09 RX ORDER — FUROSEMIDE 10 MG/ML
20 INJECTION INTRAMUSCULAR; INTRAVENOUS ONCE
Status: COMPLETED | OUTPATIENT
Start: 2024-12-09 | End: 2024-12-09

## 2024-12-09 RX ADMIN — FUROSEMIDE 20 MG: 10 INJECTION, SOLUTION INTRAMUSCULAR; INTRAVENOUS at 13:01

## 2024-12-09 RX ADMIN — SODIUM CHLORIDE, PRESERVATIVE FREE 10 ML: 5 INJECTION INTRAVENOUS at 19:33

## 2024-12-09 RX ADMIN — ASPIRIN 81 MG: 81 TABLET, COATED ORAL at 16:14

## 2024-12-09 RX ADMIN — ENOXAPARIN SODIUM 30 MG: 100 INJECTION SUBCUTANEOUS at 16:14

## 2024-12-09 ASSESSMENT — ENCOUNTER SYMPTOMS
SWOLLEN GLANDS: 0
NAUSEA: 0
SPUTUM PRODUCTION: 0
EYE PAIN: 0
ABDOMINAL PAIN: 0
VOMITING: 0
BACK PAIN: 0
SORE THROAT: 0
CHEST TIGHTNESS: 0
SHORTNESS OF BREATH: 1
HEMOPTYSIS: 0
EYE DISCHARGE: 0
DIARRHEA: 0
COUGH: 0
FACIAL SWELLING: 0

## 2024-12-09 ASSESSMENT — PAIN - FUNCTIONAL ASSESSMENT: PAIN_FUNCTIONAL_ASSESSMENT: NONE - DENIES PAIN

## 2024-12-09 ASSESSMENT — LIFESTYLE VARIABLES
HOW OFTEN DO YOU HAVE A DRINK CONTAINING ALCOHOL: NEVER
HOW MANY STANDARD DRINKS CONTAINING ALCOHOL DO YOU HAVE ON A TYPICAL DAY: PATIENT DOES NOT DRINK

## 2024-12-09 NOTE — ED PROVIDER NOTES
troponin          DISPOSITION/PLAN   DISPOSITION Decision To Admit 12/09/2024 12:47:24 PM      PATIENT REFERRED TO:  No follow-up provider specified.    DISCHARGE MEDICATIONS:  New Prescriptions    No medications on file         (Please note that portions of this note were completed with a voice recognition program.  Efforts were made to edit the dictations but occasionally words are mis-transcribed.)    Blaine Pelaez MD (electronically signed)  Emergency Attending Physician / Physician Assistant / Nurse Practitioner              Blaine Pelaez MD  12/09/24 0078

## 2024-12-09 NOTE — H&P
Hospitalist Admission Note    NAME:  Tiera Iraheta   :  1931   MRN:  [id number]     Date/Time:  2024 1:46 PM    Patient PCP: Cliff Orourke MD  ________________________________________________________________________    Given the patient's current clinical presentation, I have a high level of concern for decompensation if discharged from the emergency department.  Complex decision making was performed, which includes reviewing the patient's available past medical records, laboratory results, and x-ray films.       My assessment of this patient's clinical condition and my plan of care is as follows.    Assessment / Plan:    89 years old F with PMH of HTN, HLD, DM2, CKD 3B, complete heart block s/p dual-chamber pacemaker, carotid artery disease who presented to the emergency room today with complaint of shortness of breath on exertion.    #SOB  #CHF   #Moderate AS  TTE 24 - LVEF 50-55%   Echo 24 - LVEF 50-55% , Aortic Valve: Trileaflet valve. Moderately calcified cusps. Moderate stenosis of the aortic valve. AV mean gradient is 20 mmHg. AV peak gradient is 37 mmHg. AV peak velocity is 3.0 m/s. AV area by continuity VTI is 0.9 cm2.    BNP: 2,021   CXR: clear   S/P Iv lasix 20 in the ED  Plan  Daily wt  I&Os  Lasix IV 20 QD,   TTE  Cardiology consulted    #Mildly elevated troponin in settings of CKD 3B  Most likely demand ischemia from underlying medical conditions  Cardiology consulted further workup as needed    #CKD 3B   Per chart review, Cr is around baseline   GFR today 35, in 2024 36   Plan  Avoid nephrotoxic medications   Gentle diuresis     # QT prolongation  # Complete Heart Block/Medtronic dual chamber Pacemaker   Patient tolerated  Cardiology on board    #Type 2 diabetes mellitus with peripheral vascular disease (HCC)  SSI     #HTN  Continue home amlodipine  Continue home metoprolol  Vitals per protocol    #HLD  Will check lipid panel    #Hypothyroidism  Continue home

## 2024-12-09 NOTE — CONSULTS
[facility]                    Cardiology Care Note     [x]Initial Encounter     []Follow-up    Patient Name: Tiera Iraheta - :1931 - MRN:[id number]  Primary Cardiologist: Sekou Dyson MD, Chelsie Alan MD  Consulting Cardiologist: Sheela Prieto MD     Assessment/Plan/Discussion:Cardiology Attending:     Patient seen on the day of progress note and examined  reviewed  with Advance Practice Provider (MAKENZIE, NP,PA)       A/P/ Medical Decision Making:  Acute on chronic HFpEF  Continue diuretics IV and improved blood pressure control.  Noted murmur will check echo for AS severity  Pacer interrogated device battery is nearing but not at end-of-life  Renal insufficiency    Tiera Iraheta is a 89 y.o. female   Subj:   Prior pacemaker hypertension CRAO AS.  Patient has murmur.  Patient says she came the hospital due to shortness of breath  Obj:BP (!) 154/57   Pulse 67   Temp 97.6 °F (36.4 °C) (Oral)   Resp 15   Ht 1.575 m (5' 2\")   Wt 72.6 kg (160 lb)   SpO2 95%   BMI 29.26 kg/m²         NC AT no JVD, No jaundiceClear lungs, good air movement      Abd-non distend,Neuro -Grossly afocal, moves all ext      RRR 2/6 sys   murmur, no rub or gallop        ed      No edema    L:  Lab Results   Component Value Date    CREATININE 1.40 (H) 2024      Lab Results   Component Value Date    HGB 12.4 2024    ECHO (TTE) COMPLETE (PRN CONTRAST/BUBBLE/STRAIN/3D) 2024  4:58 PM (Final)    Interpretation Summary    Left Ventricle: Low normal left ventricular systolic function with a visually estimated EF of 50 - 55%. Left ventricle size is normal. Increased wall thickness. Findings consistent with mild concentric hypertrophy. Normal wall motion. Grade I diastolic dysfunction.    Aortic Valve: Trileaflet valve. Moderately calcified cusps. Moderate stenosis of the aortic valve. AV mean gradient is 20 mmHg. AV peak gradient is 37 mmHg. AV peak velocity is 3.0 m/s. AV area by continuity VTI is

## 2024-12-09 NOTE — ED TRIAGE NOTES
Patient to ER room for triage via wheelchair for complaints of \"I feel like my pacemaker is going off\".     Patient reports chest pain, SOB, and fatigue x 1 week. Reports cough x 1 week.    Patient has medtronic pacemaker.     Reports she is due for battery replacement soon.     EKG being completed during triage.

## 2024-12-09 NOTE — CARE COORDINATION
to accessing medicaitons at local pharmacy. Pt's daughter reports she will transport home. CM following for any dc planning needs    Discharge Concerns: []Yes [x]No []Unknown   Describe:    Financial concerns/barriers: []Yes, explain: [x]No []Unknown/Not discussed  __________________________________________________________________________    Insurer:   Active Insurance as of 12/9/2024       Primary Coverage       Payor Plan Insurance Group Employer/Plan Group    MEDICARE MEDICARE PART A AND B        Payor Address Payor Phone Number Payor Fax Number Effective Dates    [address] [address] [phone]  7/1/1996 - None Entered    [facility] [id number]         Subscriber Name Subscriber Birth Date Member ID       DEVONTE BUNCH 7/8/1931 [id number]                     PCP: Cliff Orourke MD   Address: [address] [place] [address]   Phone number: [phone]    Pharmacy:   [facility] [id number] [address] -  [phone] -  [phone]  [address] [place] [address]  Phone: [phone] Fax: [phone]    DC Transport:         Transition of care plan:    [x]Unable to determine at this time. Awaiting clinical progress, and disposition recommendations.    [] Home. No assistance required.     [] Home. Pt refused recommended services.    [x] Home with family assistance as needed, and outpatient follow-up.    [] Home with Outpatient PT and outpatient follow-up   Pt aware of OP appt? []Yes, Provider:   []Not scheduled   Transport provider:     [] Home with outpatient services.    Specify:    [] Home with Home Health   - [place] of Choice offered? [] Yes, Preference:   [] NA    []SNF/IPR   -[]Freedom of Choice offered, and preferences given:   []Listing provided and preferences requested   -Status: []Pending []Accepted:    -Auth required: []Yes []No    -Auth initiated date:   -3 midnight stay required: []Yes []No  Date satisfied:     [] LTC:     [] Home with Hospice   -

## 2024-12-09 NOTE — ED NOTES
TRANSFER - OUT REPORT:    Verbal report given to 3rd floor RN on Tiera Iraheta  being transferred to [facility] for routine progression of patient care       Report consisted of patient's Situation, Background, Assessment and   Recommendations(SBAR).     Information from the following report(s) ED Encounter Summary, ED SBAR, MAR, and Recent Results was reviewed with the receiving nurse.    [place] Fall Assessment:    Presents to emergency department  because of falls (Syncope, seizure, or loss of consciousness): No  Age > 70: Yes  Altered Mental Status, Intoxication with alcohol or substance confusion (Disorientation, impaired judgment, poor safety awaremess, or inability to follow instructions): No  Impaired Mobility: Ambulates or transfers with assistive devices or assistance; Unable to ambulate or transer.: Yes  Nursing Judgement: Yes          Lines:   Peripheral IV 12/09/24 Right Forearm (Active)   Site Assessment Clean, dry & intact 12/09/24 1021   Line Status Blood return noted 12/09/24 1021   Phlebitis Assessment No symptoms 12/09/24 1021   Infiltration Assessment 0 12/09/24 1021   Dressing Status Clean, dry & intact;New dressing applied 12/09/24 1021   Dressing Type Transparent 12/09/24 1021       Peripheral IV 12/09/24 Left Forearm (Active)   Site Assessment Clean, dry & intact 12/09/24 1703   Line Status Normal saline locked 12/09/24 1703   Phlebitis Assessment No symptoms 12/09/24 1703   Infiltration Assessment 0 12/09/24 1703   Alcohol Cap Used Yes 12/09/24 1703   Dressing Status Clean, dry & intact 12/09/24 1703   Dressing Type Transparent 12/09/24 1703        Opportunity for questions and clarification was provided.      Patient transported with:  Monitor

## 2024-12-10 VITALS
OXYGEN SATURATION: 95 % | TEMPERATURE: 98.1 F | HEIGHT: 62 IN | SYSTOLIC BLOOD PRESSURE: 131 MMHG | DIASTOLIC BLOOD PRESSURE: 51 MMHG | RESPIRATION RATE: 17 BRPM | WEIGHT: 159.9 LBS | HEART RATE: 59 BPM | BODY MASS INDEX: 29.43 KG/M2

## 2024-12-10 PROBLEM — I50.9 CONGESTIVE HEART FAILURE (HCC): Status: RESOLVED | Noted: 2024-12-09 | Resolved: 2024-12-10

## 2024-12-10 PROBLEM — I50.9 HEART FAILURE (HCC): Status: RESOLVED | Noted: 2024-12-09 | Resolved: 2024-12-10

## 2024-12-10 LAB
ALBUMIN SERPL-MCNC: 3.7 G/DL (ref 3.5–5)
ALBUMIN/GLOB SERPL: 1.2 (ref 1.1–2.2)
ALP SERPL-CCNC: 55 U/L (ref 45–117)
ALT SERPL-CCNC: 21 U/L (ref 12–78)
ANION GAP SERPL CALC-SCNC: 6 MMOL/L (ref 2–12)
APPEARANCE UR: CLEAR
AST SERPL-CCNC: 20 U/L (ref 15–37)
BACTERIA URNS QL MICRO: NEGATIVE /HPF
BILIRUB DIRECT SERPL-MCNC: 0.2 MG/DL (ref 0–0.2)
BILIRUB SERPL-MCNC: 0.7 MG/DL (ref 0.2–1)
BILIRUB UR QL: NEGATIVE
BUN SERPL-MCNC: 28 MG/DL (ref 6–20)
BUN/CREAT SERPL: 19 (ref 12–20)
CALCIUM SERPL-MCNC: 9.1 MG/DL (ref 8.5–10.1)
CHLORIDE SERPL-SCNC: 107 MMOL/L (ref 97–108)
CHOLEST SERPL-MCNC: 208 MG/DL
CO2 SERPL-SCNC: 26 MMOL/L (ref 21–32)
COLOR UR: ABNORMAL
CREAT SERPL-MCNC: 1.46 MG/DL (ref 0.55–1.02)
CREAT UR-MCNC: 132 MG/DL
EPITH CASTS URNS QL MICRO: ABNORMAL /LPF
GLOBULIN SER CALC-MCNC: 3.2 G/DL (ref 2–4)
GLUCOSE BLD STRIP.AUTO-MCNC: 156 MG/DL (ref 65–117)
GLUCOSE BLD STRIP.AUTO-MCNC: 203 MG/DL (ref 65–117)
GLUCOSE SERPL-MCNC: 139 MG/DL (ref 65–100)
GLUCOSE UR STRIP.AUTO-MCNC: NEGATIVE MG/DL
HDLC SERPL-MCNC: 48 MG/DL
HDLC SERPL: 4.3 (ref 0–5)
HGB UR QL STRIP: NEGATIVE
HYALINE CASTS URNS QL MICRO: ABNORMAL /LPF (ref 0–2)
KETONES UR QL STRIP.AUTO: NEGATIVE MG/DL
LDLC SERPL CALC-MCNC: 122 MG/DL (ref 0–100)
LEUKOCYTE ESTERASE UR QL STRIP.AUTO: ABNORMAL
MAGNESIUM SERPL-MCNC: 1.9 MG/DL (ref 1.6–2.4)
NITRITE UR QL STRIP.AUTO: NEGATIVE
PH UR STRIP: 5.5 (ref 5–8)
POTASSIUM SERPL-SCNC: 4.1 MMOL/L (ref 3.5–5.1)
PROT SERPL-MCNC: 6.9 G/DL (ref 6.4–8.2)
PROT UR STRIP-MCNC: NEGATIVE MG/DL
RBC #/AREA URNS HPF: ABNORMAL /HPF (ref 0–5)
SERVICE CMNT-IMP: ABNORMAL
SERVICE CMNT-IMP: ABNORMAL
SODIUM SERPL-SCNC: 139 MMOL/L (ref 136–145)
SODIUM UR-SCNC: 63 MMOL/L
SP GR UR REFRACTOMETRY: 1.02 (ref 1–1.03)
SPECIMEN HOLD: NORMAL
TRIGL SERPL-MCNC: 190 MG/DL
UROBILINOGEN UR QL STRIP.AUTO: 0.2 EU/DL (ref 0.2–1)
VLDLC SERPL CALC-MCNC: 38 MG/DL
WBC URNS QL MICRO: ABNORMAL /HPF (ref 0–4)

## 2024-12-10 PROCEDURE — 97116 GAIT TRAINING THERAPY: CPT

## 2024-12-10 PROCEDURE — 81001 URINALYSIS AUTO W/SCOPE: CPT

## 2024-12-10 PROCEDURE — 6360000002 HC RX W HCPCS: Performed by: STUDENT IN AN ORGANIZED HEALTH CARE EDUCATION/TRAINING PROGRAM

## 2024-12-10 PROCEDURE — 6370000000 HC RX 637 (ALT 250 FOR IP): Performed by: STUDENT IN AN ORGANIZED HEALTH CARE EDUCATION/TRAINING PROGRAM

## 2024-12-10 PROCEDURE — 96372 THER/PROPH/DIAG INJ SC/IM: CPT

## 2024-12-10 PROCEDURE — APPSS30 APP SPLIT SHARED TIME 16-30 MINUTES: Performed by: NURSE PRACTITIONER

## 2024-12-10 PROCEDURE — 97530 THERAPEUTIC ACTIVITIES: CPT

## 2024-12-10 PROCEDURE — 80076 HEPATIC FUNCTION PANEL: CPT

## 2024-12-10 PROCEDURE — 80061 LIPID PANEL: CPT

## 2024-12-10 PROCEDURE — 97165 OT EVAL LOW COMPLEX 30 MIN: CPT

## 2024-12-10 PROCEDURE — 97535 SELF CARE MNGMENT TRAINING: CPT

## 2024-12-10 PROCEDURE — 83735 ASSAY OF MAGNESIUM: CPT

## 2024-12-10 PROCEDURE — 82962 GLUCOSE BLOOD TEST: CPT

## 2024-12-10 PROCEDURE — 82570 ASSAY OF URINE CREATININE: CPT

## 2024-12-10 PROCEDURE — 96374 THER/PROPH/DIAG INJ IV PUSH: CPT

## 2024-12-10 PROCEDURE — 99232 SBSQ HOSP IP/OBS MODERATE 35: CPT | Performed by: SPECIALIST

## 2024-12-10 PROCEDURE — G0378 HOSPITAL OBSERVATION PER HR: HCPCS

## 2024-12-10 PROCEDURE — 80048 BASIC METABOLIC PNL TOTAL CA: CPT

## 2024-12-10 PROCEDURE — 2580000003 HC RX 258: Performed by: STUDENT IN AN ORGANIZED HEALTH CARE EDUCATION/TRAINING PROGRAM

## 2024-12-10 PROCEDURE — 6370000000 HC RX 637 (ALT 250 FOR IP): Performed by: SPECIALIST

## 2024-12-10 PROCEDURE — 97162 PT EVAL MOD COMPLEX 30 MIN: CPT

## 2024-12-10 PROCEDURE — 6370000000 HC RX 637 (ALT 250 FOR IP): Performed by: NURSE PRACTITIONER

## 2024-12-10 PROCEDURE — 84300 ASSAY OF URINE SODIUM: CPT

## 2024-12-10 RX ORDER — FUROSEMIDE 40 MG/1
40 TABLET ORAL DAILY
Qty: 60 TABLET | Refills: 3 | Status: SHIPPED | OUTPATIENT
Start: 2024-12-11

## 2024-12-10 RX ORDER — METOPROLOL SUCCINATE 50 MG/1
50 TABLET, EXTENDED RELEASE ORAL DAILY
Qty: 30 TABLET | Refills: 3 | Status: SHIPPED | OUTPATIENT
Start: 2024-12-11

## 2024-12-10 RX ORDER — ATORVASTATIN CALCIUM 20 MG/1
40 TABLET, FILM COATED ORAL NIGHTLY
Status: DISCONTINUED | OUTPATIENT
Start: 2024-12-10 | End: 2024-12-10 | Stop reason: HOSPADM

## 2024-12-10 RX ORDER — ASPIRIN 81 MG/1
81 TABLET ORAL DAILY
Qty: 30 TABLET | Refills: 3 | Status: SHIPPED | OUTPATIENT
Start: 2024-12-11

## 2024-12-10 RX ORDER — ATORVASTATIN CALCIUM 40 MG/1
40 TABLET, FILM COATED ORAL NIGHTLY
Qty: 30 TABLET | Refills: 3 | Status: SHIPPED | OUTPATIENT
Start: 2024-12-10

## 2024-12-10 RX ORDER — LOSARTAN POTASSIUM 25 MG/1
25 TABLET ORAL DAILY
Status: DISCONTINUED | OUTPATIENT
Start: 2024-12-10 | End: 2024-12-10 | Stop reason: HOSPADM

## 2024-12-10 RX ORDER — LOSARTAN POTASSIUM 25 MG/1
25 TABLET ORAL DAILY
Qty: 30 TABLET | Refills: 3 | Status: SHIPPED | OUTPATIENT
Start: 2024-12-11

## 2024-12-10 RX ORDER — FUROSEMIDE 40 MG/1
40 TABLET ORAL DAILY
Status: DISCONTINUED | OUTPATIENT
Start: 2024-12-11 | End: 2024-12-10 | Stop reason: HOSPADM

## 2024-12-10 RX ADMIN — SODIUM CHLORIDE, PRESERVATIVE FREE 10 ML: 5 INJECTION INTRAVENOUS at 08:49

## 2024-12-10 RX ADMIN — METOPROLOL SUCCINATE 50 MG: 50 TABLET, EXTENDED RELEASE ORAL at 08:49

## 2024-12-10 RX ADMIN — LOSARTAN POTASSIUM 25 MG: 25 TABLET, FILM COATED ORAL at 09:40

## 2024-12-10 RX ADMIN — ENOXAPARIN SODIUM 30 MG: 100 INJECTION SUBCUTANEOUS at 08:49

## 2024-12-10 RX ADMIN — INSULIN LISPRO 1 UNITS: 100 INJECTION, SOLUTION INTRAVENOUS; SUBCUTANEOUS at 08:49

## 2024-12-10 RX ADMIN — LEVOTHYROXINE SODIUM 75 MCG: 0.07 TABLET ORAL at 05:46

## 2024-12-10 RX ADMIN — AMLODIPINE BESYLATE 5 MG: 5 TABLET ORAL at 08:49

## 2024-12-10 RX ADMIN — EMPAGLIFLOZIN 10 MG: 10 TABLET, FILM COATED ORAL at 12:39

## 2024-12-10 RX ADMIN — ASPIRIN 81 MG: 81 TABLET, COATED ORAL at 08:48

## 2024-12-10 RX ADMIN — FUROSEMIDE 20 MG: 10 INJECTION, SOLUTION INTRAMUSCULAR; INTRAVENOUS at 08:49

## 2024-12-10 NOTE — PLAN OF CARE
[facility]    -PAC Inpatient Mobility Raw Score : 19  AM-PAC Inpatient T-Scale Score : 45.44     Cutoff score <=171,2,3 had higher odds of discharging home with home health or need of SNF/IPR.    1. Lauren Lam, Holly Chow, Ernesto Johns, Kathya Lopez, Ed Miranda, Robi Lam.  Validity of the AM-PAC “6-Clicks” Inpatient Daily Activity and Basic Mobility Short Forms. Physical Therapy Mar 2014, [phone]; DOI: 10.2522/ptj.[id number]  2. Mj JENNINGS, Elizabeth BAKER, Savannah BAKER, Catracho J. Association of AM-PAC \"6-Clicks\" Basic Mobility and Daily Activity Scores With Discharge Destination. Phys Ther. 2021 Apr 4;101(4):rzug891. doi: 10.1093/ptj/girb835. PMID: 78341830.  3. Lorna BAKER, Sommer VALLE, Lynnette S, Seema K, Malinda S. Activity Measure for Post-Acute Care \"6-Clicks\" Basic Mobility Scores Predict Discharge Destination After Acute Care Hospitalization in Select Patient Groups: A Retrospective, Observational Study. Arch Rehabil Res Clin Transl. 2022 Jul 16;4(3):813024. doi: 10.1016/j.arrct.2022.324220. PMID: 94033272; PMCID: VTJ1946011.  4. Carole DOLL, Irene FROST, Dario W, Trisha P. AM-PAC Short Forms Manual 4.0. Revised 2/2020.                                                                                                                                                                                                                               Pain Rating:  No pain reported    Activity Tolerance:   Fair , requires rest breaks, and SpO2 stable on room air  Rated gait as moderate level of exertion    After treatment:   Patient left in no apparent distress sitting up in chair, Call bell within reach, and Bed/ chair alarm activated    COMMUNICATION/EDUCATION:   The patient's plan of care was discussed with: occupational therapist and registered nurse    Patient Education  Education Given To: Patient  Education Provided: Role of Therapy;Plan of Care;Energy Conservation;Transfer

## 2024-12-10 NOTE — LETTER
[employer], an artificial intelligence clinical decision support software, has identified that Your patient, DEVONTE BUNCH (07/08/1931), meets the criteria for Moderate and/or Severe Aortic Stenosis based on the echo performed on 12/09/2024. Per* ACC/AHA's Valvular Heart disease guidelines, an intervention may be indicated. A referral requires a multifactorial decision outside the purview of the algorithm. For determining appropriate referral, please evaluate the patientâ€™s record.  If you find a referral is necessary and the patient does not already have a cardiologist, Cate Kapoor, the [facility] Structural Heart Valve Coordinator will be happy to assist your office in connecting this patient with the [facility] multidisciplinary heart team for further evaluation. To do so, please either send an [employer] referral to Dr. Griffin Palm, an EPIC message to Cate Kapoor or you can call [phone].  We appreciate your partnership in providing our patients the best cardiac care possible.  You are receiving this notification as part of a quality initiative using the power of artificial intelligence and the electronic health record to improve patient outcomes. Note: It is the clinicianâ€™s decision and ultimate responsibility whether to not refer if clinically indicated to do so. [employer] Next is intended for use by a qualified healthcare professional.

## 2024-12-10 NOTE — DISCHARGE INSTRUCTIONS
HOSPITALIST DISCHARGE INSTRUCTIONS  NAME:  Tiera Iraheta   :  1931   MRN:  [id number]     Date/Time:  12/10/2024 11:46 AM    ADMIT DATE: 2024     DISCHARGE DATE: 12/10/2024     DISCHARGE DIAGNOSIS:  Heart failure     DISCHARGE INSTRUCTIONS:  Thank you for allowing us to participate in your care. Your discharging Hospitalist is Tobi Mccord MD. You were admitted for evaluation and treatment of the above.     #SOB  #CHF   #Moderate AS  TTE 24 - LVEF 50-55%   Echo 24 - LVEF 50-55% , Aortic Valve: Trileaflet valve. Moderately calcified cusps. Moderate stenosis of the aortic valve. AV mean gradient is 20 mmHg. AV peak gradient is 37 mmHg. AV peak velocity is 3.0 m/s. AV area by continuity VTI is 0.9 cm2.   TTE : Left Ventricle: Mildly reduced left ventricular systolic function with a visually estimated EF of 40 - 45%. Left ventricle size is normal. Normal wall thickness. Mild global hypokinesis present.   PE: notable for murmur, however TTE didn't show abnormality    CXR: clear   Plan  Continue Jardiance, losartan, Lasix, metoprolol  Follow-up with cardiology outpatient     #Mildly elevated troponin in settings of CKD 3B  Most likely demand ischemia from underlying medical conditions, no further intervention required     #CKD 3B   Per chart review, Cr is around baseline   GFR today 35, in 2024 36   Plan  Avoid nephrotoxic medications, monitor with diuresis      # QT prolongation  # Complete Heart Block/Medtronic dual chamber Pacemaker   Cardiology on board     #Type 2 diabetes mellitus with peripheral vascular disease (HCC)  Continue home meds     #HTN  As above     #HLD  Chol : 208  T  LDL: 122   - Will start statin if tolerated by the patient      #Hypothyroidism  Continue home levothyroxine dose   TSH T4 WNL      #Central Retinal artery occlusion    - On  -  Patient has a central retinal artery occlusion in left eye - she has lost 90% of vision in left eye   - Echo 18 -

## 2024-12-10 NOTE — PROGRESS NOTES
0700: Bedside and Verbal shift change report given to JOLLY Scott (oncoming nurse) by JOLLY Conrad (offgoing nurse). Report included the following information Nurse Handoff Report, Adult Overview, Recent Results, Cardiac Rhythm av paced, and Neuro Assessment.     1040: Pacemaker interrogated  
1817: Dr. Mccord notified of /73. Gave order to recheck in 30 minutes.    1852: f/u /67.  
Future Appointments   Date Time Provider Department Center   12/26/2024 10:00 AM Sekou Dyson MD [facility] AMB   2/10/2025  1:30 PM [facility] ECHO 1 [facility] AMB   2/10/2025  2:40 PM Sekou Dyson MD [facility] AMB   8/11/2025 10:20 AM PACEMAKER, [facility] AMB   8/11/2025 10:40 AM Venice Rowe, APRN - NP [facility] AMB    Have appt now with Dr Dyson on 12-26-24 for hospital follow up, please let pt know at dc  
OCCUPATIONAL THERAPY EVALUATION/DISCHARGE  Patient: Tiera Iraheta (89 y.o. female)  Date: 12/10/2024  Primary Diagnosis: Shortness of breath [R06.02]  Heart failure (HCC) [I50.9]  Elevated troponin [R79.89]  Congestive heart failure, unspecified HF chronicity, unspecified heart failure type (HCC) [I50.9]         Precautions: Fall Risk    ASSESSMENT :  Based on the objective data below, the patient presents with good overall activity tolerance following admission on 12/9/24 for CHF and elevated troponin with c/o chest pain and SOB.  Patient     Functional Outcome Measure:  The patient scored 24/24 on the AM-PAC outcome measure.      Further skilled acute occupational therapy is not indicated at this time.     PLAN :    Recommendation for discharge: (in order for the patient to meet his/her long term goals):   No skilled occupational therapy    Other factors to consider for discharge: no additional factors    IF patient discharges home will need the following DME: none     SUBJECTIVE:   Patient agreeable to OT evaluation.      OBJECTIVE DATA SUMMARY:     Past Medical History:   Diagnosis Date    Central retinal artery occlusion     12/18 - lost 90% of vision of left eye    Coronary atherosclerosis of native coronary artery     adeno card 2008: fixed inf defect, EF 47%. Echo 2008: EF 55%, mild MR.    Diabetes (HCC)     HTN (hypertension)     Mild aortic stenosis     Mobitz (type) II atrioventricular block     Pure hypercholesterolemia      Past Surgical History:   Procedure Laterality Date    PACEMAKER  2009    Ventricular lead failure. No access through stenosed left subclavian. No access from right side due to stenosis of SVC-RA junction.     PACEMAKER  2008    DDD, V lead Fx 2009.    PACEMAKER  2008    Gen Change       Prior Level of Function/Environment/Context:  Receives Help From: Family, Prior Level of Assist for ADLs: Needs assistance, [place]: Supervision (shower chair),  ,  ,  ,  , Prior Level of Assist for 
hernias  Lymph:  No cervical or inguinal adenopathy  Musc:  No cyanosis or clubbing  Skin:   No rashes or ulcers, skin turgor is good  Neuro:  Cranial nerves are grossly intact, no focal motor weakness, follows commands appropriately  Psych:  Good insight, oriented to person, place and time, alert    Medications Reviewed: (see below)    Lab Data Reviewed: (see below)    ______________________________________________________________________    Medications:     Current Facility-Administered Medications   Medication Dose Route Frequency    losartan (COZAAR) tablet 25 mg  25 mg Oral Daily    [START ON 12/11/2024] furosemide (LASIX) tablet 40 mg  40 mg Oral Daily    sodium chloride flush 0.9 % injection 5-40 mL  5-40 mL IntraVENous 2 times per day    sodium chloride flush 0.9 % injection 5-40 mL  5-40 mL IntraVENous PRN    0.9 % sodium chloride infusion   IntraVENous PRN    polyethylene glycol (GLYCOLAX) packet 17 g  17 g Oral Daily PRN    acetaminophen (TYLENOL) tablet 650 mg  650 mg Oral Q6H PRN    Or    acetaminophen (TYLENOL) suppository 650 mg  650 mg Rectal Q6H PRN    enoxaparin Sodium (LOVENOX) injection 30 mg  30 mg SubCUTAneous Daily    metoprolol succinate (TOPROL XL) extended release tablet 50 mg  50 mg Oral Daily    levothyroxine (SYNTHROID) tablet 75 mcg  75 mcg Oral QAM AC    aspirin EC tablet 81 mg  81 mg Oral Daily    insulin lispro (HUMALOG,ADMELOG) injection vial 0-4 Units  0-4 Units SubCUTAneous 4x Daily AC & HS            Lab Review:     Recent Labs     12/09/24  1027   WBC 6.5   HGB 12.4   HCT 35.5        Recent Labs     12/09/24  1027 12/10/24  0220    139   K 4.2 4.1    107   CO2 23 26   BUN 24* 28*   MG 2.0 1.9   ALT 19 21     No components found for: \"GLPOC\"      
needed.  STOMACH: Unremarkable.  SMALL BOWEL: No dilatation or wall thickening.  COLON: No dilatation or wall thickening. Marked sigmoid diverticulosis.  Increased stool in the colon.  APPENDIX: Normal on axial image 55  PERITONEUM: No ascites or pneumoperitoneum.  RETROPERITONEUM: No lymphadenopathy or aortic aneurysm.  REPRODUCTIVE ORGANS:  URINARY BLADDER: No mass or calculus.  BONES: No destructive bone lesion. S-shaped scoliosis of the thoracolumbar  spine. Disc desiccation at all levels except for L5-S1. Multifactorial central  canal stenosis L1-2 and L2-3 and L3-4 and L4-5 measuring about 5 mm. Bilateral  neural foraminal stenosis, more marked on the right. On the right at L2-3, L3-4,  L4-5 and L5-S1 and moderate severe on the left at L2-3 and severe on the left at  L4-5 and L5-S1.    ABDOMINAL WALL: No mass or hernia.  ADDITIONAL COMMENTS: N/A    Impression  Marked sigmoid diverticulosis.  Lumbar scoliosis with multilevel central stenosis and multilevel neural  foraminal stenosis.  No renal, ureteral, or bladder calculus.    Electronically signed by Candy Washington      Lab Results   Component Value Date/Time     12/10/2024 02:20 AM    K 4.1 12/10/2024 02:20 AM     12/10/2024 02:20 AM    CO2 26 12/10/2024 02:20 AM    BUN 28 12/10/2024 02:20 AM    CREATININE 1.46 12/10/2024 02:20 AM    GLUCOSE 139 12/10/2024 02:20 AM    CALCIUM 9.1 12/10/2024 02:20 AM    LABGLOM 33 12/10/2024 02:20 AM      No results found for: \"BNP\"  Lab Results   Component Value Date    WBC 6.5 12/09/2024    HGB 12.4 12/09/2024    HCT 35.5 12/09/2024    MCV 78.5 (L) 12/09/2024     12/09/2024     Recent Labs     12/10/24  0220   CHOL 208*       Current meds:    Current Facility-Administered Medications:     sodium chloride flush 0.9 % injection 5-40 mL, 5-40 mL, IntraVENous, 2 times per day, Tobi Mccord MD, 10 mL at 12/10/24 0849    sodium chloride flush 0.9 % injection 5-40 mL, 5-40 mL, IntraVENous, PRN, Tobi Mccord,

## 2024-12-10 NOTE — DISCHARGE SUMMARY
CONTINUE these medications which have CHANGED    Details   aspirin 81 MG EC tablet Take 1 tablet by mouth daily  Qty: 30 tablet, Refills: 3      metoprolol succinate (TOPROL XL) 50 MG extended release tablet Take 1 tablet by mouth daily  Qty: 30 tablet, Refills: 3           CONTINUE these medications which have NOT CHANGED    Details   NONFORMULARY 1 capsule daily Prebiotic and probiotic      Metamucil Fiber CHEW Take 2-3 tablets by mouth daily      vitamin D 25 MCG (1000 UT) CAPS Take 1 capsule by mouth daily Usually 1-2 times per week      levothyroxine (SYNTHROID) 50 MCG tablet Take by mouth every morning (before breakfast)      metFORMIN (GLUCOPHAGE) 500 MG tablet Take 1 tablet by mouth daily (with breakfast)           STOP taking these medications       naproxen (NAPROSYN) 250 MG tablet Comments:   Reason for Stopping:         amLODIPine (NORVASC) 5 MG tablet Comments:   Reason for Stopping:         fluticasone (FLONASE) 50 MCG/ACT nasal spray Comments:   Reason for Stopping:             Activity: activity as tolerated  Diet: cardiac diet  Wound Care: none needed    Follow-up with Cliff Orourke MD in 1 week.  Follow-up tests/labs follow-up on echo    Approximate time spent in patient care on day of discharge: 50 min    Signed:  Tobi Mccord MD  12/10/2024  11:48 AM

## 2024-12-12 LAB
EKG ATRIAL RATE: 71 BPM
EKG DIAGNOSIS: NORMAL
EKG P AXIS: 90 DEGREES
EKG P-R INTERVAL: 140 MS
EKG Q-T INTERVAL: 494 MS
EKG QRS DURATION: 200 MS
EKG QTC CALCULATION (BAZETT): 536 MS
EKG R AXIS: 90 DEGREES
EKG T AXIS: 270 DEGREES
EKG VENTRICULAR RATE: 71 BPM

## 2024-12-12 PROCEDURE — 93010 ELECTROCARDIOGRAM REPORT: CPT | Performed by: SPECIALIST

## 2024-12-26 ENCOUNTER — OFFICE VISIT (OUTPATIENT)
Age: 88
End: 2024-12-26
Payer: MEDICARE

## 2024-12-26 VITALS
SYSTOLIC BLOOD PRESSURE: 126 MMHG | HEIGHT: 62 IN | DIASTOLIC BLOOD PRESSURE: 76 MMHG | OXYGEN SATURATION: 91 % | BODY MASS INDEX: 26.35 KG/M2 | HEART RATE: 73 BPM | WEIGHT: 143.2 LBS

## 2024-12-26 DIAGNOSIS — I35.0 NONRHEUMATIC AORTIC VALVE STENOSIS: ICD-10-CM

## 2024-12-26 DIAGNOSIS — I50.33 ACUTE ON CHRONIC DIASTOLIC HEART FAILURE (HCC): ICD-10-CM

## 2024-12-26 DIAGNOSIS — R06.02 SOB (SHORTNESS OF BREATH): Primary | ICD-10-CM

## 2024-12-26 PROCEDURE — 1159F MED LIST DOCD IN RCRD: CPT | Performed by: SPECIALIST

## 2024-12-26 PROCEDURE — G8419 CALC BMI OUT NRM PARAM NOF/U: HCPCS | Performed by: SPECIALIST

## 2024-12-26 PROCEDURE — G8427 DOCREV CUR MEDS BY ELIG CLIN: HCPCS | Performed by: SPECIALIST

## 2024-12-26 PROCEDURE — 1123F ACP DISCUSS/DSCN MKR DOCD: CPT | Performed by: SPECIALIST

## 2024-12-26 PROCEDURE — 1090F PRES/ABSN URINE INCON ASSESS: CPT | Performed by: SPECIALIST

## 2024-12-26 PROCEDURE — 1036F TOBACCO NON-USER: CPT | Performed by: SPECIALIST

## 2024-12-26 PROCEDURE — 99215 OFFICE O/P EST HI 40 MIN: CPT | Performed by: SPECIALIST

## 2024-12-26 PROCEDURE — G8484 FLU IMMUNIZE NO ADMIN: HCPCS | Performed by: SPECIALIST

## 2024-12-26 PROCEDURE — 1160F RVW MEDS BY RX/DR IN RCRD: CPT | Performed by: SPECIALIST

## 2024-12-26 PROCEDURE — 1126F AMNT PAIN NOTED NONE PRSNT: CPT | Performed by: SPECIALIST

## 2024-12-26 RX ORDER — LOSARTAN POTASSIUM 50 MG/1
50 TABLET ORAL DAILY
Qty: 90 TABLET | Refills: 3 | Status: SHIPPED | OUTPATIENT
Start: 2024-12-26

## 2024-12-26 NOTE — PROGRESS NOTES
Seoku Dyson MD. [facility]          Patient: Tiera Iraheta  : 1931      Today's Date: 2024        HISTORY OF PRESENT ILLNESS:     History of Present Illness:    A little SOB at times.  Taking lasix 40 mg daily.    No CP or lightheadedness.   SBP usually 130's.        PAST MEDICAL HISTORY:     Past Medical History:   Diagnosis Date    Central retinal artery occlusion      - lost 90% of vision of left eye    Coronary atherosclerosis of native coronary artery     adeno card : fixed inf defect, EF 47%. Echo : EF 55%, mild MR.    Diabetes (HCC)     HTN (hypertension)     Mild aortic stenosis     Mobitz (type) II atrioventricular block     Pure hypercholesterolemia        Past Surgical History:   Procedure Laterality Date    PACEMAKER      Ventricular lead failure. No access through stenosed left subclavian. No access from right side due to stenosis of SVC-RA junction.     PACEMAKER      DDD, V lead Fx .    PACEMAKER      Gen Change             CURRENT MEDICATIONS:    .  Current Outpatient Medications   Medication Sig Dispense Refill    aspirin 81 MG EC tablet Take 1 tablet by mouth daily 30 tablet 3    empagliflozin (JARDIANCE) 10 MG tablet Take 1 tablet by mouth daily 30 tablet 3    metoprolol succinate (TOPROL XL) 50 MG extended release tablet Take 1 tablet by mouth daily 30 tablet 3    furosemide (LASIX) 40 MG tablet Take 1 tablet by mouth daily 60 tablet 3    losartan (COZAAR) 25 MG tablet Take 1 tablet by mouth daily 30 tablet 3    NONFORMULARY 1 capsule daily Prebiotic and probiotic      vitamin D 25 MCG (1000 UT) CAPS Take 1 capsule by mouth daily Usually 1-2 times per week      levothyroxine (SYNTHROID) 50 MCG tablet Take by mouth every morning (before breakfast)      metFORMIN (GLUCOPHAGE) 500 MG tablet Take 1 tablet by mouth daily (with breakfast)      atorvastatin (LIPITOR) 40 MG tablet Take 1 tablet by mouth nightly 30 tablet 3    Metamucil Fiber CHEW Take 2-3 tablets

## 2024-12-26 NOTE — TELEPHONE ENCOUNTER
Called pt,  Mailbox full; unable to LVM.    Per Dr. Dyson,   \"Can you please tell her forget Entresto -- have to avoid given angioedema on ACE-I.     Will go back to losartan but at 50 mg daily.  Will see how this works \"    Spoke to pt,  She had read the package insert and had decided she was not going to take them.  She confirmed instruction to not start Entresto, but to instead increase Losartan to 50 mg daily.

## 2024-12-26 NOTE — PROGRESS NOTES
Chief Complaint   Patient presents with    chol    Hypertension    Diabetes     Vitals:    12/26/24 1104   BP: 126/76   Site: Left Upper Arm   Position: Sitting   Pulse: 73   SpO2: 91%   Weight: 65 kg (143 lb 3.2 oz)   Height: 1.575 m (5' 2\")       Chest pain NO     ER, urgent care, or hospitalized outside of [facility] since your last visit?  NO     Refills NO   Sob

## 2025-01-27 ENCOUNTER — TELEPHONE (OUTPATIENT)
Age: 89
End: 2025-01-27

## 2025-01-27 NOTE — TELEPHONE ENCOUNTER
Pacer check was normal- no concerns. Asked her to give me a recent BP reading. She will try to get one within an hour. No edema in feet/legs or fullness in abdomin.     Please advise  
Patient having discomfort for past couple of weeks feeling like pacemaker battery needs to be changed and she would like a nurse to follow up with her at [phone] and she is trying to be seen earlier and her prior pacemaker appointments and provider appointments were cancelled, trying to get earlier slot also   
Pt states she has had chest discomfort and shortness of breath x 2 weeks when trying to do activities. No edema. She is concerned that her pacer is end of life. She will send a download to clinic and I will call her back with results.   
R/t call to pt,    BP x3:   144/66, p83  132/56 p 71  128/52 p 69  Confirmed pt sits, waits 1-2 min prior to 1st reading  Waits 5 minutes sitting, 2nd reading  3-4 minutes.    Asked her to sit 3-5 minutes prior to checking BP & only need to check once.  She expressed understanding.    Relayed recommendation:Per JANAE Cummings NP: \"  Can we call and see how she sounds? Can take extra dose 40mg lasix for 3 days   \"    She wanted to let us know that she d/c Jardiance d/t extreme dizziness, extremely thirsty.  Let her know I added it to allergy list.    Confirmed NOV.  Future Appointments   Date Time Provider Department Center   2/28/2025 10:00 AM [facility] ECHO 3 [facility] AM[facility]   2/28/2025 11:00 AM Sekou Dyson MD [facility] AMB   8/11/2025 10:20 AM PACEMAKER, [facility] AM[facility]   8/11/2025 10:40 AM Venice Rowe APRN - [facility] AMB       
Transmission received, device functioning appropriately as programmed, no events. 18 months battery longevity.   
 / Black

## 2025-02-24 ENCOUNTER — HOSPITAL ENCOUNTER (EMERGENCY)
Facility: HOSPITAL | Age: 89
Discharge: HOME | End: 2025-02-24
Attending: EMERGENCY MEDICINE
Payer: MEDICARE

## 2025-02-24 ENCOUNTER — APPOINTMENT (OUTPATIENT)
Facility: HOSPITAL | Age: 89
End: 2025-02-24
Payer: MEDICARE

## 2025-02-24 VITALS
BODY MASS INDEX: 25.95 KG/M2 | RESPIRATION RATE: 16 BRPM | DIASTOLIC BLOOD PRESSURE: 91 MMHG | HEART RATE: 87 BPM | HEIGHT: 62 IN | WEIGHT: 141 LBS | SYSTOLIC BLOOD PRESSURE: 169 MMHG | OXYGEN SATURATION: 92 % | TEMPERATURE: 98.2 F

## 2025-02-24 DIAGNOSIS — J40 BRONCHITIS: Primary | ICD-10-CM

## 2025-02-24 DIAGNOSIS — R05.9 COUGH, UNSPECIFIED TYPE: ICD-10-CM

## 2025-02-24 LAB
EKG ATRIAL RATE: 78 BPM
EKG DIAGNOSIS: NORMAL
EKG P AXIS: 76 DEGREES
EKG P-R INTERVAL: 136 MS
EKG Q-T INTERVAL: 498 MS
EKG QRS DURATION: 202 MS
EKG QTC CALCULATION (BAZETT): 567 MS
EKG R AXIS: 11 DEGREES
EKG T AXIS: 248 DEGREES
EKG VENTRICULAR RATE: 78 BPM

## 2025-02-24 PROCEDURE — 93005 ELECTROCARDIOGRAM TRACING: CPT | Performed by: EMERGENCY MEDICINE

## 2025-02-24 PROCEDURE — 93010 ELECTROCARDIOGRAM REPORT: CPT | Performed by: INTERNAL MEDICINE

## 2025-02-24 PROCEDURE — 99284 EMERGENCY DEPT VISIT MOD MDM: CPT

## 2025-02-24 PROCEDURE — 71046 X-RAY EXAM CHEST 2 VIEWS: CPT

## 2025-02-24 RX ORDER — ALBUTEROL SULFATE 90 UG/1
2 INHALANT RESPIRATORY (INHALATION) 4 TIMES DAILY PRN
Qty: 18 G | Refills: 0 | Status: SHIPPED
Start: 2025-02-24 | End: 2025-02-24

## 2025-02-24 RX ORDER — ALBUTEROL SULFATE 90 UG/1
2 INHALANT RESPIRATORY (INHALATION) 4 TIMES DAILY PRN
Qty: 18 G | Refills: 0 | Status: SHIPPED | OUTPATIENT
Start: 2025-02-24

## 2025-02-24 ASSESSMENT — PAIN DESCRIPTION - FREQUENCY: FREQUENCY: CONTINUOUS

## 2025-02-24 ASSESSMENT — PAIN DESCRIPTION - PAIN TYPE: TYPE: CHRONIC PAIN

## 2025-02-24 ASSESSMENT — PAIN DESCRIPTION - LOCATION: LOCATION: CHEST

## 2025-02-24 ASSESSMENT — PAIN - FUNCTIONAL ASSESSMENT: PAIN_FUNCTIONAL_ASSESSMENT: 0-10

## 2025-02-24 ASSESSMENT — PAIN DESCRIPTION - DESCRIPTORS: DESCRIPTORS: ACHING

## 2025-02-24 NOTE — ED TRIAGE NOTES
Patient reports cough, congestion, and shortness of breath x10 days. Reports constant chest pain of 6/10 and has a pacemaker.

## 2025-02-24 NOTE — DISCHARGE INSTRUCTIONS
You were seen in the emergency department today for cough for the past 10 days.  We did a chest x-ray which showed no pneumonia.  Given the length of time of your symptoms, your lack of fever and your physical exam, we do not suspect any sort of bacterial infection I do not believe you need any antibiotics at this time.  Your symptoms are consistent with a viral bronchitis.  We recommend you continue taking your Mucinex and your Delsym regularly to help control the cough.  We are adding on an inhaler to help break up the bronchospasm.  You can use that 2 puffs every 4 hours as needed for cough.  Please call your primary care doctor and make a follow-up appointment.

## 2025-02-24 NOTE — ED PROVIDER NOTES
LEVOTHYROXINE (SYNTHROID) 50 MCG TABLET    Take by mouth every morning (before breakfast)    LOSARTAN (COZAAR) 50 MG TABLET    Take 1 tablet by mouth daily    METAMUCIL FIBER CHEW    Take 2-3 tablets by mouth daily    METFORMIN (GLUCOPHAGE) 500 MG TABLET    Take 1 tablet by mouth daily (with breakfast)    METOPROLOL SUCCINATE (TOPROL XL) 50 MG EXTENDED RELEASE TABLET    Take 1 tablet by mouth daily    NONFORMULARY    1 capsule daily Prebiotic and probiotic    VITAMIN D 25 MCG (1000 UT) CAPS    Take 1 capsule by mouth daily Usually 1-2 times per week       ALLERGIES     Jardiance [empagliflozin], Simvastatin, and Lisinopril    FAMILY HISTORY       Family History   Problem Relation Age of Onset    Hypertension Mother           SOCIAL HISTORY       Social History     Socioeconomic History    Marital status:    Tobacco Use    Smoking status: Former    Smokeless tobacco: Never   Substance and Sexual Activity    Alcohol use: Yes     Comment: occassionally    Drug use: No     Social Determinants of Health     Food Insecurity: Patient Declined (12/9/2024)    Hunger Vital Sign     Worried About Running Out of Food in the Last Year: Patient declined     Ran Out of Food in the Last Year: Patient declined   Transportation Needs: Patient Declined (12/9/2024)    PRAPARE - Transportation     Lack of Transportation (Medical): Patient declined     Lack of Transportation (Non-Medical): Patient declined   Housing Stability: Patient Declined (12/9/2024)    Housing Stability Vital Sign     Unable to Pay for Housing in the Last Year: Patient declined     Number of Times Moved in the Last Year: 0     Homeless in the Last Year: Patient declined           PHYSICAL EXAM    (up to 7 for level 4, 8 or more for level 5)     ED Triage Vitals   BP Systolic BP Percentile Diastolic BP Percentile Temp Temp Source Pulse Respirations SpO2   02/24/25 0725 -- -- 02/24/25 0717 02/24/25 0717 02/24/25 0717 02/24/25 0717 02/24/25 0717   (!) 173/73

## 2025-05-05 ENCOUNTER — HOSPITAL ENCOUNTER (INPATIENT)
Facility: HOSPITAL | Age: 89
LOS: 3 days | Discharge: HOME | DRG: 291 | End: 2025-05-08
Attending: EMERGENCY MEDICINE | Admitting: INTERNAL MEDICINE
Payer: MEDICARE

## 2025-05-05 ENCOUNTER — APPOINTMENT (OUTPATIENT)
Facility: HOSPITAL | Age: 89
DRG: 291 | End: 2025-05-05
Payer: MEDICARE

## 2025-05-05 DIAGNOSIS — I50.21 ACUTE SYSTOLIC CONGESTIVE HEART FAILURE (HCC): ICD-10-CM

## 2025-05-05 DIAGNOSIS — I50.9 ACUTE EXACERBATION OF CHRONIC HEART FAILURE (HCC): Primary | ICD-10-CM

## 2025-05-05 LAB
ALBUMIN SERPL-MCNC: 4.4 G/DL (ref 3.5–5.2)
ALBUMIN/GLOB SERPL: 1.5 (ref 1.1–2.2)
ALP SERPL-CCNC: 86 U/L (ref 35–104)
ALT SERPL-CCNC: 74 U/L (ref 10–35)
ANION GAP SERPL CALC-SCNC: 15 MMOL/L (ref 2–12)
AST SERPL-CCNC: 64 U/L (ref 10–35)
BASE DEFICIT BLDV-SCNC: 2.9 MMOL/L
BASOPHILS # BLD: 0.11 K/UL (ref 0–0.1)
BASOPHILS NFR BLD: 1.5 % (ref 0–1)
BILIRUB SERPL-MCNC: 1.1 MG/DL (ref 0.2–1)
BUN SERPL-MCNC: 32 MG/DL (ref 8–23)
BUN/CREAT SERPL: 24 (ref 12–20)
CALCIUM SERPL-MCNC: 9.4 MG/DL (ref 8.2–9.6)
CHLORIDE SERPL-SCNC: 106 MMOL/L (ref 98–107)
CO2 SERPL-SCNC: 20 MMOL/L (ref 22–29)
COMMENT:: NORMAL
CREAT SERPL-MCNC: 1.35 MG/DL (ref 0.5–0.9)
DIFFERENTIAL METHOD BLD: ABNORMAL
EOSINOPHIL # BLD: 0.06 K/UL (ref 0–0.4)
EOSINOPHIL NFR BLD: 0.8 % (ref 0–7)
ERYTHROCYTE [DISTWIDTH] IN BLOOD BY AUTOMATED COUNT: 14.7 % (ref 11.5–14.5)
EST. AVERAGE GLUCOSE BLD GHB EST-MCNC: 217 MG/DL
GLOBULIN SER CALC-MCNC: 2.9 G/DL (ref 2–4)
GLUCOSE BLD STRIP.AUTO-MCNC: 327 MG/DL (ref 65–117)
GLUCOSE SERPL-MCNC: 304 MG/DL (ref 65–100)
HBA1C MFR BLD: 9.2 % (ref 4–5.6)
HCO3 BLDV-SCNC: 22.3 MMOL/L (ref 23–28)
HCT VFR BLD AUTO: 33.4 % (ref 35–47)
HGB BLD-MCNC: 11.5 G/DL (ref 11.5–16)
IMM GRANULOCYTES # BLD AUTO: 0.07 K/UL (ref 0–0.04)
IMM GRANULOCYTES NFR BLD AUTO: 1 % (ref 0–0.5)
LYMPHOCYTES # BLD: 1.12 K/UL (ref 0.8–3.5)
LYMPHOCYTES NFR BLD: 15.4 % (ref 12–49)
MCH RBC QN AUTO: 26.6 PG (ref 26–34)
MCHC RBC AUTO-ENTMCNC: 34.4 G/DL (ref 30–36.5)
MCV RBC AUTO: 77.3 FL (ref 80–99)
MONOCYTES # BLD: 0.61 K/UL (ref 0–1)
MONOCYTES NFR BLD: 8.3 % (ref 5–13)
NEUTS SEG # BLD: 5.33 K/UL (ref 1.8–8)
NEUTS SEG NFR BLD: 73 % (ref 32–75)
NRBC # BLD: 0 K/UL (ref 0–0.01)
NRBC BLD-RTO: 0 PER 100 WBC
NT PRO BNP: ABNORMAL PG/ML
PCO2 BLDV: 39.2 MMHG (ref 41–51)
PH BLDV: 7.36 (ref 7.32–7.42)
PLATELET # BLD AUTO: 183 K/UL (ref 150–400)
PLATELET COMMENT: ABNORMAL
PO2 BLDV: 43 MMHG (ref 25–40)
POTASSIUM SERPL-SCNC: 4.3 MMOL/L (ref 3.5–5.1)
PROT SERPL-MCNC: 7.3 G/DL (ref 6.4–8.3)
RBC # BLD AUTO: 4.32 M/UL (ref 3.8–5.2)
RBC MORPH BLD: ABNORMAL
SAO2 % BLDV: 77.2 % (ref 65–88)
SERVICE CMNT-IMP: ABNORMAL
SODIUM SERPL-SCNC: 141 MMOL/L (ref 136–145)
SPECIMEN HOLD: NORMAL
SPECIMEN TYPE: ABNORMAL
TROPONIN T SERPL HS-MCNC: 36.4 NG/L (ref 0–14)
TROPONIN T SERPL HS-MCNC: 41.9 NG/L (ref 0–14)
WBC # BLD AUTO: 7.3 K/UL (ref 3.6–11)

## 2025-05-05 PROCEDURE — 6360000002 HC RX W HCPCS: Performed by: EMERGENCY MEDICINE

## 2025-05-05 PROCEDURE — 6360000002 HC RX W HCPCS: Performed by: INTERNAL MEDICINE

## 2025-05-05 PROCEDURE — 6370000000 HC RX 637 (ALT 250 FOR IP): Performed by: EMERGENCY MEDICINE

## 2025-05-05 PROCEDURE — 83880 ASSAY OF NATRIURETIC PEPTIDE: CPT

## 2025-05-05 PROCEDURE — 6370000000 HC RX 637 (ALT 250 FOR IP): Performed by: INTERNAL MEDICINE

## 2025-05-05 PROCEDURE — 83036 HEMOGLOBIN GLYCOSYLATED A1C: CPT

## 2025-05-05 PROCEDURE — 85025 COMPLETE CBC W/AUTO DIFF WBC: CPT

## 2025-05-05 PROCEDURE — 80053 COMPREHEN METABOLIC PANEL: CPT

## 2025-05-05 PROCEDURE — 99285 EMERGENCY DEPT VISIT HI MDM: CPT

## 2025-05-05 PROCEDURE — 82962 GLUCOSE BLOOD TEST: CPT

## 2025-05-05 PROCEDURE — 36415 COLL VENOUS BLD VENIPUNCTURE: CPT

## 2025-05-05 PROCEDURE — 71046 X-RAY EXAM CHEST 2 VIEWS: CPT

## 2025-05-05 PROCEDURE — 6360000004 HC RX CONTRAST MEDICATION: Performed by: EMERGENCY MEDICINE

## 2025-05-05 PROCEDURE — 2500000003 HC RX 250 WO HCPCS: Performed by: INTERNAL MEDICINE

## 2025-05-05 PROCEDURE — 84484 ASSAY OF TROPONIN QUANT: CPT

## 2025-05-05 PROCEDURE — 82803 BLOOD GASES ANY COMBINATION: CPT

## 2025-05-05 PROCEDURE — 96374 THER/PROPH/DIAG INJ IV PUSH: CPT

## 2025-05-05 PROCEDURE — 2060000000 HC ICU INTERMEDIATE R&B

## 2025-05-05 PROCEDURE — 71275 CT ANGIOGRAPHY CHEST: CPT

## 2025-05-05 RX ORDER — ACETAMINOPHEN 650 MG/1
650 SUPPOSITORY RECTAL EVERY 6 HOURS PRN
Status: DISCONTINUED | OUTPATIENT
Start: 2025-05-05 | End: 2025-05-08 | Stop reason: HOSPADM

## 2025-05-05 RX ORDER — SODIUM CHLORIDE 9 MG/ML
INJECTION, SOLUTION INTRAVENOUS PRN
Status: DISCONTINUED | OUTPATIENT
Start: 2025-05-05 | End: 2025-05-08 | Stop reason: HOSPADM

## 2025-05-05 RX ORDER — DEXTROSE MONOHYDRATE 100 MG/ML
INJECTION, SOLUTION INTRAVENOUS CONTINUOUS PRN
Status: DISCONTINUED | OUTPATIENT
Start: 2025-05-05 | End: 2025-05-08 | Stop reason: HOSPADM

## 2025-05-05 RX ORDER — POLYETHYLENE GLYCOL 3350 17 G/17G
17 POWDER, FOR SOLUTION ORAL DAILY PRN
Status: DISCONTINUED | OUTPATIENT
Start: 2025-05-05 | End: 2025-05-08 | Stop reason: HOSPADM

## 2025-05-05 RX ORDER — HYDRALAZINE HYDROCHLORIDE 20 MG/ML
5 INJECTION INTRAMUSCULAR; INTRAVENOUS EVERY 6 HOURS PRN
Status: DISCONTINUED | OUTPATIENT
Start: 2025-05-05 | End: 2025-05-08 | Stop reason: HOSPADM

## 2025-05-05 RX ORDER — ONDANSETRON 4 MG/1
4 TABLET, ORALLY DISINTEGRATING ORAL EVERY 8 HOURS PRN
Status: DISCONTINUED | OUTPATIENT
Start: 2025-05-05 | End: 2025-05-08 | Stop reason: HOSPADM

## 2025-05-05 RX ORDER — FUROSEMIDE 10 MG/ML
40 INJECTION INTRAMUSCULAR; INTRAVENOUS ONCE
Status: COMPLETED | OUTPATIENT
Start: 2025-05-05 | End: 2025-05-05

## 2025-05-05 RX ORDER — ALBUTEROL SULFATE 0.83 MG/ML
2.5 SOLUTION RESPIRATORY (INHALATION) EVERY 4 HOURS PRN
Status: DISCONTINUED | OUTPATIENT
Start: 2025-05-05 | End: 2025-05-08 | Stop reason: HOSPADM

## 2025-05-05 RX ORDER — FUROSEMIDE 10 MG/ML
40 INJECTION INTRAMUSCULAR; INTRAVENOUS 2 TIMES DAILY
Status: DISCONTINUED | OUTPATIENT
Start: 2025-05-06 | End: 2025-05-05

## 2025-05-05 RX ORDER — FUROSEMIDE 10 MG/ML
20 INJECTION INTRAMUSCULAR; INTRAVENOUS 2 TIMES DAILY
Status: DISCONTINUED | OUTPATIENT
Start: 2025-05-05 | End: 2025-05-05

## 2025-05-05 RX ORDER — LEVOTHYROXINE SODIUM 75 UG/1
75 TABLET ORAL
Status: DISCONTINUED | OUTPATIENT
Start: 2025-05-06 | End: 2025-05-08 | Stop reason: HOSPADM

## 2025-05-05 RX ORDER — ONDANSETRON 2 MG/ML
4 INJECTION INTRAMUSCULAR; INTRAVENOUS EVERY 6 HOURS PRN
Status: DISCONTINUED | OUTPATIENT
Start: 2025-05-05 | End: 2025-05-08 | Stop reason: HOSPADM

## 2025-05-05 RX ORDER — FUROSEMIDE 10 MG/ML
40 INJECTION INTRAMUSCULAR; INTRAVENOUS 2 TIMES DAILY
Status: DISCONTINUED | OUTPATIENT
Start: 2025-05-05 | End: 2025-05-08

## 2025-05-05 RX ORDER — SODIUM CHLORIDE 0.9 % (FLUSH) 0.9 %
5-40 SYRINGE (ML) INJECTION EVERY 12 HOURS SCHEDULED
Status: DISCONTINUED | OUTPATIENT
Start: 2025-05-05 | End: 2025-05-08 | Stop reason: HOSPADM

## 2025-05-05 RX ORDER — ENOXAPARIN SODIUM 100 MG/ML
30 INJECTION SUBCUTANEOUS DAILY
Status: DISCONTINUED | OUTPATIENT
Start: 2025-05-05 | End: 2025-05-08 | Stop reason: HOSPADM

## 2025-05-05 RX ORDER — SODIUM CHLORIDE 0.9 % (FLUSH) 0.9 %
5-40 SYRINGE (ML) INJECTION PRN
Status: DISCONTINUED | OUTPATIENT
Start: 2025-05-05 | End: 2025-05-08 | Stop reason: HOSPADM

## 2025-05-05 RX ORDER — IOPAMIDOL 755 MG/ML
100 INJECTION, SOLUTION INTRAVASCULAR
Status: COMPLETED | OUTPATIENT
Start: 2025-05-05 | End: 2025-05-05

## 2025-05-05 RX ORDER — FUROSEMIDE 10 MG/ML
20 INJECTION INTRAMUSCULAR; INTRAVENOUS 2 TIMES DAILY
Status: DISCONTINUED | OUTPATIENT
Start: 2025-05-06 | End: 2025-05-05

## 2025-05-05 RX ORDER — LOSARTAN POTASSIUM 50 MG/1
50 TABLET ORAL DAILY
Status: DISCONTINUED | OUTPATIENT
Start: 2025-05-05 | End: 2025-05-07

## 2025-05-05 RX ORDER — ASPIRIN 81 MG/1
81 TABLET, CHEWABLE ORAL DAILY
Status: DISCONTINUED | OUTPATIENT
Start: 2025-05-05 | End: 2025-05-08 | Stop reason: HOSPADM

## 2025-05-05 RX ORDER — ACETAMINOPHEN 325 MG/1
650 TABLET ORAL EVERY 6 HOURS PRN
Status: DISCONTINUED | OUTPATIENT
Start: 2025-05-05 | End: 2025-05-08 | Stop reason: HOSPADM

## 2025-05-05 RX ORDER — METOPROLOL SUCCINATE 50 MG/1
50 TABLET, EXTENDED RELEASE ORAL DAILY
Status: DISCONTINUED | OUTPATIENT
Start: 2025-05-05 | End: 2025-05-08 | Stop reason: HOSPADM

## 2025-05-05 RX ORDER — GUAIFENESIN 600 MG/1
600 TABLET, EXTENDED RELEASE ORAL 2 TIMES DAILY
Status: DISCONTINUED | OUTPATIENT
Start: 2025-05-05 | End: 2025-05-08 | Stop reason: HOSPADM

## 2025-05-05 RX ORDER — INSULIN LISPRO 100 [IU]/ML
0-8 INJECTION, SOLUTION INTRAVENOUS; SUBCUTANEOUS
Status: DISCONTINUED | OUTPATIENT
Start: 2025-05-05 | End: 2025-05-08 | Stop reason: HOSPADM

## 2025-05-05 RX ADMIN — GUAIFENESIN 600 MG: 600 TABLET, EXTENDED RELEASE ORAL at 21:36

## 2025-05-05 RX ADMIN — ENOXAPARIN SODIUM 30 MG: 100 INJECTION SUBCUTANEOUS at 15:44

## 2025-05-05 RX ADMIN — NITROGLYCERIN 0.5 INCH: 20 OINTMENT TOPICAL at 12:53

## 2025-05-05 RX ADMIN — INSULIN LISPRO 6 UNITS: 100 INJECTION, SOLUTION INTRAVENOUS; SUBCUTANEOUS at 21:35

## 2025-05-05 RX ADMIN — ACETAMINOPHEN 650 MG: 325 TABLET ORAL at 15:16

## 2025-05-05 RX ADMIN — FUROSEMIDE 40 MG: 10 INJECTION, SOLUTION INTRAMUSCULAR; INTRAVENOUS at 21:35

## 2025-05-05 RX ADMIN — METOPROLOL SUCCINATE 50 MG: 50 TABLET, EXTENDED RELEASE ORAL at 15:44

## 2025-05-05 RX ADMIN — SODIUM CHLORIDE, PRESERVATIVE FREE 10 ML: 5 INJECTION INTRAVENOUS at 21:38

## 2025-05-05 RX ADMIN — FUROSEMIDE 40 MG: 10 INJECTION, SOLUTION INTRAMUSCULAR; INTRAVENOUS at 12:48

## 2025-05-05 RX ADMIN — LOSARTAN POTASSIUM 50 MG: 50 TABLET, FILM COATED ORAL at 15:44

## 2025-05-05 RX ADMIN — ASPIRIN 81 MG: 81 TABLET, CHEWABLE ORAL at 21:36

## 2025-05-05 RX ADMIN — IOPAMIDOL 100 ML: 755 INJECTION, SOLUTION INTRAVENOUS at 12:39

## 2025-05-05 ASSESSMENT — PAIN DESCRIPTION - DESCRIPTORS
DESCRIPTORS: CRAMPING;SPASM
DESCRIPTORS: ACHING;SORE

## 2025-05-05 ASSESSMENT — PAIN - FUNCTIONAL ASSESSMENT
PAIN_FUNCTIONAL_ASSESSMENT: PREVENTS OR INTERFERES SOME ACTIVE ACTIVITIES AND ADLS
PAIN_FUNCTIONAL_ASSESSMENT: 0-10

## 2025-05-05 ASSESSMENT — PAIN SCALES - GENERAL
PAINLEVEL_OUTOF10: 5
PAINLEVEL_OUTOF10: 3
PAINLEVEL_OUTOF10: 0

## 2025-05-05 ASSESSMENT — PAIN DESCRIPTION - ORIENTATION: ORIENTATION: RIGHT;LEFT

## 2025-05-05 ASSESSMENT — ENCOUNTER SYMPTOMS
SHORTNESS OF BREATH: 1
GASTROINTESTINAL NEGATIVE: 1
EYES NEGATIVE: 1

## 2025-05-05 ASSESSMENT — PAIN DESCRIPTION - LOCATION
LOCATION: GENERALIZED
LOCATION: LEG

## 2025-05-05 NOTE — H&P
[facility]  [address] [place]  [address] [phone]    Admission History and Physical      NAME:  Tiera Iraheta   :   1931   MRN:  [id number]     PCP:  Cliff Orourke MD     Date/Time:  2025         Subjective:     CHIEF COMPLAINT: \"I am SOB\"     HISTORY OF PRESENT ILLNESS:     Ms. Iraheta is a 89 y.o.   female with PMH of DM, HTN, AS, XOL admitted for dyspnea. Per pt, has noted dyspnea, LE edema and abdominal distension as well as cough productive of blood tinged sputum. Already feels improved after one dose of 40mg IV lasix in the ED. Denies fevers/chills. No h/o asthma. Only briefly smoked in college.       Past Medical History:   Diagnosis Date    Central retinal artery occlusion      - lost 90% of vision of left eye    Coronary atherosclerosis of native coronary artery     adeno card 2008: fixed inf defect, EF 47%. Echo 2008: EF 55%, mild MR.    Diabetes (HCC)     HTN (hypertension)     Mild aortic stenosis     Mobitz (type) II atrioventricular block     Pure hypercholesterolemia         Past Surgical History:   Procedure Laterality Date    PACEMAKER  2009    Ventricular lead failure. No access through stenosed left subclavian. No access from right side due to stenosis of SVC-RA junction.     PACEMAKER  2008    DDD, V lead Fx 2009.    PACEMAKER  2008    Gen Change       Social History     Tobacco Use    Smoking status: Former    Smokeless tobacco: Never   Substance Use Topics    Alcohol use: Yes     Comment: occassionally        Family History   Problem Relation Age of Onset    Hypertension Mother         Allergies   Allergen Reactions    Jardiance [Empagliflozin]      Dizziness, extreme thirst    Simvastatin      Other reaction(s): Unknown (comments)  Dry lips and mouth    Lisinopril Angioedema     Face swelling        Prior to Admission medications    Medication Sig Start Date End Date Taking? Authorizing Provider   albuterol

## 2025-05-05 NOTE — ED NOTES
Patient states she now remembers that she forgot to take her daily medications. Cozaar and Toprol XL given at this time. Will continue to monitor BP.

## 2025-05-05 NOTE — ED NOTES
Patient declined 2 RN skin assessment. Patient out the door at this time with [facility] crew. No infusions running at this time.

## 2025-05-05 NOTE — ED PROVIDER NOTES
Worried About Running Out of Food in the Last Year: Patient declined     Ran Out of Food in the Last Year: Patient declined   Transportation Needs: Patient Declined (12/9/2024)    PRAPARE - Transportation     Lack of Transportation (Medical): Patient declined     Lack of Transportation (Non-Medical): Patient declined   Housing Stability: Patient Declined (12/9/2024)    Housing Stability Vital Sign     Unable to Pay for Housing in the Last Year: Patient declined     Number of Times Moved in the Last Year: 0     Homeless in the Last Year: Patient declined           PHYSICAL EXAM    (up to 7 for level 4, 8 or more for level 5)     ED Triage Vitals [05/05/25 1049]   BP Systolic BP Percentile Diastolic BP Percentile Temp Temp Source Pulse Respirations SpO2   (!) 145/92 -- -- 97.3 °F (36.3 °C) Oral 90 17 95 %      Height Weight         -- --             There is no height or weight on file to calculate BMI.    Physical Exam  Vitals and nursing note reviewed.   Constitutional:       General: She is not in acute distress.     Appearance: She is ill-appearing.   HENT:      Head: Normocephalic and atraumatic.      Nose: Nose normal.      Mouth/Throat:      Mouth: Mucous membranes are moist.   Eyes:      Pupils: Pupils are equal, round, and reactive to light.   Cardiovascular:      Rate and Rhythm: Normal rate and regular rhythm.   Pulmonary:      Effort: Respiratory distress present.      Breath sounds: Normal breath sounds.      Comments: Mild increased work of breathing, mildly tachypneic.  Rales appreciated in the bilateral lung bases  Abdominal:      General: There is no distension.      Palpations: Abdomen is soft.      Tenderness: There is no abdominal tenderness.   Musculoskeletal:      Cervical back: Normal range of motion.   Skin:     General: Skin is warm and dry.   Neurological:      General: No focal deficit present.      Mental Status: She is alert and oriented to person, place, and time.   Psychiatric:

## 2025-05-05 NOTE — ED NOTES
TRANSFER - OUT REPORT:    Verbal report given to April RN on Tiera Iraheta  being transferred to [facility] for routine progression of patient care       Report consisted of patient's Situation, Background, Assessment and   Recommendations(SBAR).     Information from the following report(s) Nurse Handoff Report, ED SBAR, Intake/Output, MAR, Recent Results, Cardiac Rhythm Atrial Paced, and Neuro Assessment was reviewed with the receiving nurse.    [place] Fall Assessment:    Presents to emergency department  because of falls (Syncope, seizure, or loss of consciousness): No  Age > 70: Yes  Altered Mental Status, Intoxication with alcohol or substance confusion (Disorientation, impaired judgment, poor safety awaremess, or inability to follow instructions): No  Impaired Mobility: Ambulates or transfers with assistive devices or assistance; Unable to ambulate or transer.: No  Nursing Judgement: Yes          Lines:   Peripheral IV 05/05/25 Distal;Posterior;Right Forearm (Active)   Site Assessment Clean, dry & intact;Ecchymotic 05/05/25 1127   Line Status Normal saline locked 05/05/25 1127   Phlebitis Assessment No symptoms 05/05/25 1127   Infiltration Assessment 0 05/05/25 1127   Alcohol Cap Used Yes 05/05/25 1127   Dressing Status Clean, dry & intact;New dressing applied 05/05/25 1127   Dressing Type Transparent 05/05/25 1127   Dressing Intervention New 05/05/25 1127        Opportunity for questions and clarification was provided.      Patient transported with:  Monitor

## 2025-05-05 NOTE — ED NOTES
Patient up to bedside commode with standby assist, continent void with standby assist, back to bed with min assist. Patient noted to be extremely dyspneic on exertion with activity however Sp02 intact.

## 2025-05-05 NOTE — ED TRIAGE NOTES
Pt reports to ED w/ cc of cough and congestion that has been ongoing since being dx w/ bronchitis. Said in her phlegm she sees streaks of blood. Pt also endorses fatigue.

## 2025-05-05 NOTE — ED NOTES
Patient reporting frustration at this time with admission process citing long wait time for a bed. Patient states she ate a [employer] sandwich and lemonade brought by her daughter. Educated on fluid and sodium restriction. Patient updated on plan of care to transfer to [facility] with transport at 1820.

## 2025-05-06 LAB
ANION GAP SERPL CALC-SCNC: 9 MMOL/L (ref 2–12)
BASOPHILS # BLD: 0.11 K/UL (ref 0–0.1)
BASOPHILS NFR BLD: 1.6 % (ref 0–1)
BUN SERPL-MCNC: 34 MG/DL (ref 6–20)
BUN/CREAT SERPL: 23 (ref 12–20)
CALCIUM SERPL-MCNC: 8.8 MG/DL (ref 8.5–10.1)
CHLORIDE SERPL-SCNC: 107 MMOL/L (ref 97–108)
CO2 SERPL-SCNC: 24 MMOL/L (ref 21–32)
CREAT SERPL-MCNC: 1.45 MG/DL (ref 0.55–1.02)
DIFFERENTIAL METHOD BLD: ABNORMAL
EOSINOPHIL # BLD: 0.18 K/UL (ref 0–0.4)
EOSINOPHIL NFR BLD: 2.7 % (ref 0–7)
ERYTHROCYTE [DISTWIDTH] IN BLOOD BY AUTOMATED COUNT: 14.8 % (ref 11.5–14.5)
GLUCOSE BLD STRIP.AUTO-MCNC: 146 MG/DL (ref 65–117)
GLUCOSE BLD STRIP.AUTO-MCNC: 192 MG/DL (ref 65–117)
GLUCOSE BLD STRIP.AUTO-MCNC: 228 MG/DL (ref 65–117)
GLUCOSE BLD STRIP.AUTO-MCNC: 358 MG/DL (ref 65–117)
GLUCOSE SERPL-MCNC: 167 MG/DL (ref 65–100)
HCT VFR BLD AUTO: 32.9 % (ref 35–47)
HGB BLD-MCNC: 11.3 G/DL (ref 11.5–16)
IMM GRANULOCYTES # BLD AUTO: 0.06 K/UL (ref 0–0.04)
IMM GRANULOCYTES NFR BLD AUTO: 0.9 % (ref 0–0.5)
LYMPHOCYTES # BLD: 1 K/UL (ref 0.8–3.5)
LYMPHOCYTES NFR BLD: 14.9 % (ref 12–49)
MAGNESIUM SERPL-MCNC: 2.3 MG/DL (ref 1.6–2.4)
MCH RBC QN AUTO: 26.3 PG (ref 26–34)
MCHC RBC AUTO-ENTMCNC: 34.3 G/DL (ref 30–36.5)
MCV RBC AUTO: 76.7 FL (ref 80–99)
MONOCYTES # BLD: 0.77 K/UL (ref 0–1)
MONOCYTES NFR BLD: 11.5 % (ref 5–13)
NEUTS SEG # BLD: 4.58 K/UL (ref 1.8–8)
NEUTS SEG NFR BLD: 68.4 % (ref 32–75)
NRBC # BLD: 0 K/UL (ref 0–0.01)
NRBC BLD-RTO: 0 PER 100 WBC
PLATELET # BLD AUTO: 175 K/UL (ref 150–400)
PMV BLD AUTO: 12.7 FL (ref 8.9–12.9)
POTASSIUM SERPL-SCNC: 4 MMOL/L (ref 3.5–5.1)
RBC # BLD AUTO: 4.29 M/UL (ref 3.8–5.2)
SERVICE CMNT-IMP: ABNORMAL
SODIUM SERPL-SCNC: 140 MMOL/L (ref 136–145)
TROPONIN I SERPL HS-MCNC: 98 NG/L (ref 0–51)
WBC # BLD AUTO: 6.7 K/UL (ref 3.6–11)

## 2025-05-06 PROCEDURE — 6370000000 HC RX 637 (ALT 250 FOR IP): Performed by: INTERNAL MEDICINE

## 2025-05-06 PROCEDURE — 2060000000 HC ICU INTERMEDIATE R&B

## 2025-05-06 PROCEDURE — 2580000003 HC RX 258: Performed by: INTERNAL MEDICINE

## 2025-05-06 PROCEDURE — 2500000003 HC RX 250 WO HCPCS: Performed by: INTERNAL MEDICINE

## 2025-05-06 PROCEDURE — 97161 PT EVAL LOW COMPLEX 20 MIN: CPT

## 2025-05-06 PROCEDURE — 6370000000 HC RX 637 (ALT 250 FOR IP): Performed by: NURSE PRACTITIONER

## 2025-05-06 PROCEDURE — 2700000000 HC OXYGEN THERAPY PER DAY

## 2025-05-06 PROCEDURE — 97116 GAIT TRAINING THERAPY: CPT

## 2025-05-06 PROCEDURE — 97165 OT EVAL LOW COMPLEX 30 MIN: CPT

## 2025-05-06 PROCEDURE — 83735 ASSAY OF MAGNESIUM: CPT

## 2025-05-06 PROCEDURE — 97530 THERAPEUTIC ACTIVITIES: CPT

## 2025-05-06 PROCEDURE — 84484 ASSAY OF TROPONIN QUANT: CPT

## 2025-05-06 PROCEDURE — 94761 N-INVAS EAR/PLS OXIMETRY MLT: CPT

## 2025-05-06 PROCEDURE — 82962 GLUCOSE BLOOD TEST: CPT

## 2025-05-06 PROCEDURE — 85025 COMPLETE CBC W/AUTO DIFF WBC: CPT

## 2025-05-06 PROCEDURE — 6360000002 HC RX W HCPCS: Performed by: INTERNAL MEDICINE

## 2025-05-06 PROCEDURE — 80048 BASIC METABOLIC PNL TOTAL CA: CPT

## 2025-05-06 RX ORDER — INSULIN LISPRO 100 [IU]/ML
1 INJECTION, SOLUTION INTRAVENOUS; SUBCUTANEOUS ONCE
Status: COMPLETED | OUTPATIENT
Start: 2025-05-06 | End: 2025-05-06

## 2025-05-06 RX ORDER — DIAZEPAM 5 MG/1
5 TABLET ORAL ONCE
Status: COMPLETED | OUTPATIENT
Start: 2025-05-06 | End: 2025-05-06

## 2025-05-06 RX ADMIN — LOSARTAN POTASSIUM 50 MG: 50 TABLET, FILM COATED ORAL at 09:47

## 2025-05-06 RX ADMIN — GUAIFENESIN 600 MG: 600 TABLET, EXTENDED RELEASE ORAL at 20:49

## 2025-05-06 RX ADMIN — SODIUM CHLORIDE, PRESERVATIVE FREE 10 ML: 5 INJECTION INTRAVENOUS at 20:50

## 2025-05-06 RX ADMIN — METOPROLOL SUCCINATE 50 MG: 50 TABLET, EXTENDED RELEASE ORAL at 09:48

## 2025-05-06 RX ADMIN — INSULIN LISPRO 8 UNITS: 100 INJECTION, SOLUTION INTRAVENOUS; SUBCUTANEOUS at 20:49

## 2025-05-06 RX ADMIN — INSULIN LISPRO 2 UNITS: 100 INJECTION, SOLUTION INTRAVENOUS; SUBCUTANEOUS at 09:44

## 2025-05-06 RX ADMIN — FUROSEMIDE 40 MG: 10 INJECTION, SOLUTION INTRAMUSCULAR; INTRAVENOUS at 09:47

## 2025-05-06 RX ADMIN — SODIUM CHLORIDE, PRESERVATIVE FREE 10 ML: 5 INJECTION INTRAVENOUS at 09:49

## 2025-05-06 RX ADMIN — DIAZEPAM 5 MG: 5 TABLET ORAL at 00:20

## 2025-05-06 RX ADMIN — INSULIN LISPRO 2 UNITS: 100 INJECTION, SOLUTION INTRAVENOUS; SUBCUTANEOUS at 12:11

## 2025-05-06 RX ADMIN — DOXYCYCLINE 100 MG: 100 INJECTION, POWDER, LYOPHILIZED, FOR SOLUTION INTRAVENOUS at 16:42

## 2025-05-06 RX ADMIN — INSULIN LISPRO 1 UNITS: 100 INJECTION, SOLUTION INTRAVENOUS; SUBCUTANEOUS at 20:49

## 2025-05-06 RX ADMIN — FUROSEMIDE 40 MG: 10 INJECTION, SOLUTION INTRAMUSCULAR; INTRAVENOUS at 18:48

## 2025-05-06 RX ADMIN — ASPIRIN 81 MG: 81 TABLET, CHEWABLE ORAL at 09:53

## 2025-05-06 RX ADMIN — GUAIFENESIN 600 MG: 600 TABLET, EXTENDED RELEASE ORAL at 09:47

## 2025-05-06 RX ADMIN — LEVOTHYROXINE SODIUM 75 MCG: 0.07 TABLET ORAL at 09:53

## 2025-05-06 RX ADMIN — DOXYCYCLINE 100 MG: 100 INJECTION, POWDER, LYOPHILIZED, FOR SOLUTION INTRAVENOUS at 04:31

## 2025-05-06 RX ADMIN — ENOXAPARIN SODIUM 30 MG: 100 INJECTION SUBCUTANEOUS at 09:45

## 2025-05-06 NOTE — PLAN OF CARE
Problem: Physical Therapy - Adult  Goal: By Discharge: Performs mobility at highest level of function for planned discharge setting.  See evaluation for individualized goals.  Description: FUNCTIONAL STATUS PRIOR TO ADMISSION: Patient was modified independent using a rollator for functional mobility.  Dtr assisted with SBA with bathing.    HOME SUPPORT PRIOR TO ADMISSION: The patient lived with dtr and required stand-by assistance for bathing.    Physical Therapy Goals  Initiated 5/6/2025  1.  Patient will move from supine to sit and sit to supine in bed with modified independence within 7 day(s).    2.  Patient will perform sit to stand with supervision/set-up within 7 day(s).  3.  Patient will transfer from bed to chair and chair to bed with contact guard assist using the least restrictive device within 7 day(s).  4.  Patient will ambulate with contact guard assist for 150 feet with the least restrictive device within 7 day(s).   5.  Patient will ascend/descend 4 stairs with  handrail(s) with contact guard assist within 7 day(s).  Outcome: Progressing   PHYSICAL THERAPY EVALUATION    Patient: Tiera Iraheta (89 y.o. female)  Date: 5/6/2025  Primary Diagnosis: Acute exacerbation of chronic heart failure (HCC) [I50.9]       Precautions: Restrictions/Precautions  Restrictions/Precautions: Fall Risk            ASSESSMENT :   DEFICITS/IMPAIRMENTS:   The patient is limited by decreased functional mobility, independence in ADLs, strength, body mechanics, activity tolerance, endurance, safety awareness, coordination, balance.  Pt admitted due to acute CHF exacerbation.    Based on the impairments listed above pt received on room air with O2 sats in high 90's.  CGA to EOB.  Good sitting balance with high guard for donning of socks.  She reports RLE weakness due to tibial fx  from childhood, yet no knee buckling noted at this time. Sit to stand with CGA with RW.  Gait of 50' with noted path deviations and Min A.  Placed in

## 2025-05-06 NOTE — CARE COORDINATION
Care Management Initial Assessment  5/6/2025 10:20 AM  If patient is discharged prior to next notation, then this note serves as note for discharge by case management.    Reason for Admission:   Acute exacerbation of chronic heart failure (HCC) [I50.9]       Patient Admission Status: Inpatient  Date Admitted to INP: 5/5/2025  RUR: Readmission Risk Score: 15.2    Hospitalization in the last 30 days (Readmission):  No        Advance Care Planning:  Code Status: Full Code  Primary Healthcare Decision Maker: (P) Legal Next of Kin   Advance Directive: has NO advanced directive - not interested in additional information     __________________________________________________________________________  Assessment:      05/06/25 1012   Service Assessment   Patient Orientation Person;Place  (patient was sleeping soundly, upon waking was not sure whether it was day or night. she seemed slightly confused however was aware that she was in [facility].)   Cognition Alert   History Provided By Child/Family  (daughter, Roque)   Primary Caregiver Family   Support Systems Children   Patient's Healthcare Decision Maker is: Legal Next of Kin   PCP Verified by CM Yes  (Dr. Orourke)   Last Visit to PCP Within last 3 months   Prior Functional Level Assistance with the following:;Bathing;Cooking;Housework;Shopping;Mobility   Current Functional Level Assistance with the following:;Bathing;Toileting;Cooking;Housework;Shopping;Mobility   Can patient return to prior living arrangement Unknown at present   Ability to make needs known: Fair   Family able to assist with home care needs: Yes   Would you like for me to discuss the discharge plan with any other family members/significant others, and if so, who? Yes  (daughter, Roque)   Financial Resources Medicare   Social/Functional History   Lives With Family;Daughter   Type of Home House   Home Layout Two level  (bedroom is on 2nd flr. has 6 steps and landing then 7 additional steps. family have

## 2025-05-06 NOTE — PLAN OF CARE
Problem: Occupational Therapy - Adult  Goal: By Discharge: Performs self-care activities at highest level of function for planned discharge setting.  See evaluation for individualized goals.  Description: FUNCTIONAL STATUS PRIOR TO ADMISSION:  Patient lives with her daughter and family, and is Mod I with rollator or cane at baseline. Her daughter supervises for showering    Occupational Therapy Goals:  Initiated 5/6/2025  1.  Patient will perform grooming in standing with Modified [place] within 7 day(s).  2.  Patient will perform bathing with Modified [place] within 7 day(s).  3.  Patient will perform lower body dressing with Modified [place] within 7 day(s).  4.  Patient will perform toilet transfers with Modified [place]  within 7 day(s).  5.  Patient will perform all aspects of toileting with Modified [place] within 7 day(s).  6.  Patient will participate in upper extremity therapeutic exercise/activities with Ferry for 5 minutes within 7 day(s).    7.  Patient will utilize energy conservation techniques during functional activities without cues within 7 day(s).    Outcome: Progressing    OCCUPATIONAL THERAPY EVALUATION    Patient: Tiera Iraheta (89 y.o. female)  Date: 5/6/2025  Primary Diagnosis: Acute exacerbation of chronic heart failure (HCC) [I50.9]         Precautions: Fall Risk                  ASSESSMENT :  The patient is limited by decreased functional mobility, independence in ADLs, strength, coordination, balance after presenting for cough, congestion and dyspnea, patient admitted for CHF exacerbation.    Patient received in bed on RA, satting 94-96%. Patient has a history of incontinence, and was wet, therefore completed toileting and lower body dressing tasks in supine prior to coming to sit EOB. She required SBA with bed mobility, and Min A with toileting and lower body dressing tasks. Likely patient requires CGA-Min A at most with other ADLs. Patient completed

## 2025-05-07 ENCOUNTER — APPOINTMENT (OUTPATIENT)
Facility: HOSPITAL | Age: 89
DRG: 291 | End: 2025-05-07
Attending: HOSPITALIST
Payer: MEDICARE

## 2025-05-07 LAB
ANION GAP SERPL CALC-SCNC: 10 MMOL/L (ref 2–12)
BASOPHILS # BLD: 0.1 K/UL (ref 0–0.1)
BASOPHILS NFR BLD: 1.3 % (ref 0–1)
BUN SERPL-MCNC: 33 MG/DL (ref 6–20)
BUN/CREAT SERPL: 24 (ref 12–20)
CALCIUM SERPL-MCNC: 8.8 MG/DL (ref 8.5–10.1)
CHLORIDE SERPL-SCNC: 106 MMOL/L (ref 97–108)
CO2 SERPL-SCNC: 24 MMOL/L (ref 21–32)
CREAT SERPL-MCNC: 1.37 MG/DL (ref 0.55–1.02)
DIFFERENTIAL METHOD BLD: ABNORMAL
ECHO AV MEAN GRADIENT: 21 MMHG
ECHO AV MEAN VELOCITY: 2.2 M/S
ECHO AV PEAK GRADIENT: 34 MMHG
ECHO AV PEAK VELOCITY: 2.9 M/S
ECHO AV VELOCITY RATIO: 0.41
ECHO AV VTI: 61.7 CM
ECHO BSA: 1.83 M2
ECHO EST RA PRESSURE: 15 MMHG
ECHO LV E' LATERAL VELOCITY: 5.49 CM/S
ECHO LV E' SEPTAL VELOCITY: 2.84 CM/S
ECHO LV EDV A2C: 46 ML
ECHO LV EDV A4C: 65 ML
ECHO LV EDV BP: 62 ML (ref 56–104)
ECHO LV EDV INDEX A4C: 37 ML/M2
ECHO LV EDV INDEX BP: 35 ML/M2
ECHO LV EDV NDEX A2C: 26 ML/M2
ECHO LV EF PHYSICIAN: 35 %
ECHO LV EJECTION FRACTION A2C: 32 %
ECHO LV EJECTION FRACTION A4C: 25 %
ECHO LV ESV A2C: 32 ML
ECHO LV ESV A4C: 49 ML
ECHO LV ESV BP: 44 ML (ref 19–49)
ECHO LV ESV INDEX A2C: 18 ML/M2
ECHO LV ESV INDEX A4C: 28 ML/M2
ECHO LV ESV INDEX BP: 25 ML/M2
ECHO LVOT AV VTI INDEX: 0.47
ECHO LVOT MEAN GRADIENT: 4 MMHG
ECHO LVOT PEAK GRADIENT: 6 MMHG
ECHO LVOT PEAK VELOCITY: 1.2 M/S
ECHO LVOT VTI: 28.9 CM
ECHO MV A VELOCITY: 0.95 M/S
ECHO MV E DECELERATION TIME (DT): 271.3 MS
ECHO MV E VELOCITY: 0.8 M/S
ECHO MV E/A RATIO: 0.84
ECHO MV E/E' LATERAL: 14.57
ECHO MV E/E' RATIO (AVERAGED): 21.37
ECHO MV E/E' SEPTAL: 28.17
ECHO RIGHT VENTRICULAR SYSTOLIC PRESSURE (RVSP): 47 MMHG
ECHO RV FREE WALL PEAK S': 10.6 CM/S
ECHO TV REGURGITANT MAX VELOCITY: 2.83 M/S
ECHO TV REGURGITANT PEAK GRADIENT: 32 MMHG
EOSINOPHIL # BLD: 0.35 K/UL (ref 0–0.4)
EOSINOPHIL NFR BLD: 4.7 % (ref 0–7)
ERYTHROCYTE [DISTWIDTH] IN BLOOD BY AUTOMATED COUNT: 14.7 % (ref 11.5–14.5)
GLUCOSE BLD STRIP.AUTO-MCNC: 182 MG/DL (ref 65–117)
GLUCOSE BLD STRIP.AUTO-MCNC: 182 MG/DL (ref 65–117)
GLUCOSE BLD STRIP.AUTO-MCNC: 215 MG/DL (ref 65–117)
GLUCOSE BLD STRIP.AUTO-MCNC: 232 MG/DL (ref 65–117)
GLUCOSE SERPL-MCNC: 129 MG/DL (ref 65–100)
HCT VFR BLD AUTO: 32.9 % (ref 35–47)
HGB BLD-MCNC: 11.5 G/DL (ref 11.5–16)
IMM GRANULOCYTES # BLD AUTO: 0.06 K/UL (ref 0–0.04)
IMM GRANULOCYTES NFR BLD AUTO: 0.8 % (ref 0–0.5)
LYMPHOCYTES # BLD: 1.75 K/UL (ref 0.8–3.5)
LYMPHOCYTES NFR BLD: 23.4 % (ref 12–49)
MCH RBC QN AUTO: 26.5 PG (ref 26–34)
MCHC RBC AUTO-ENTMCNC: 35 G/DL (ref 30–36.5)
MCV RBC AUTO: 75.8 FL (ref 80–99)
MONOCYTES # BLD: 0.96 K/UL (ref 0–1)
MONOCYTES NFR BLD: 12.8 % (ref 5–13)
NEUTS SEG # BLD: 4.27 K/UL (ref 1.8–8)
NEUTS SEG NFR BLD: 57 % (ref 32–75)
NRBC # BLD: 0 K/UL (ref 0–0.01)
NRBC BLD-RTO: 0 PER 100 WBC
NT PRO BNP: 5973 PG/ML
PLATELET # BLD AUTO: 188 K/UL (ref 150–400)
PMV BLD AUTO: 12.4 FL (ref 8.9–12.9)
POTASSIUM SERPL-SCNC: 3.7 MMOL/L (ref 3.5–5.1)
RBC # BLD AUTO: 4.34 M/UL (ref 3.8–5.2)
SERVICE CMNT-IMP: ABNORMAL
SODIUM SERPL-SCNC: 140 MMOL/L (ref 136–145)
WBC # BLD AUTO: 7.5 K/UL (ref 3.6–11)

## 2025-05-07 PROCEDURE — 2580000003 HC RX 258: Performed by: INTERNAL MEDICINE

## 2025-05-07 PROCEDURE — 82962 GLUCOSE BLOOD TEST: CPT

## 2025-05-07 PROCEDURE — 2500000003 HC RX 250 WO HCPCS: Performed by: INTERNAL MEDICINE

## 2025-05-07 PROCEDURE — 6370000000 HC RX 637 (ALT 250 FOR IP): Performed by: INTERNAL MEDICINE

## 2025-05-07 PROCEDURE — 2580000003 HC RX 258: Performed by: HOSPITALIST

## 2025-05-07 PROCEDURE — 80048 BASIC METABOLIC PNL TOTAL CA: CPT

## 2025-05-07 PROCEDURE — 97116 GAIT TRAINING THERAPY: CPT

## 2025-05-07 PROCEDURE — 2060000000 HC ICU INTERMEDIATE R&B

## 2025-05-07 PROCEDURE — 6370000000 HC RX 637 (ALT 250 FOR IP): Performed by: SPECIALIST

## 2025-05-07 PROCEDURE — 99222 1ST HOSP IP/OBS MODERATE 55: CPT | Performed by: SPECIALIST

## 2025-05-07 PROCEDURE — 94761 N-INVAS EAR/PLS OXIMETRY MLT: CPT

## 2025-05-07 PROCEDURE — 83880 ASSAY OF NATRIURETIC PEPTIDE: CPT

## 2025-05-07 PROCEDURE — APPSS45 APP SPLIT SHARED TIME 31-45 MINUTES: Performed by: NURSE PRACTITIONER

## 2025-05-07 PROCEDURE — 85025 COMPLETE CBC W/AUTO DIFF WBC: CPT

## 2025-05-07 PROCEDURE — 93306 TTE W/DOPPLER COMPLETE: CPT

## 2025-05-07 PROCEDURE — 6360000002 HC RX W HCPCS: Performed by: INTERNAL MEDICINE

## 2025-05-07 PROCEDURE — 6360000002 HC RX W HCPCS: Performed by: HOSPITALIST

## 2025-05-07 PROCEDURE — 36415 COLL VENOUS BLD VENIPUNCTURE: CPT

## 2025-05-07 PROCEDURE — 97110 THERAPEUTIC EXERCISES: CPT

## 2025-05-07 RX ORDER — LOSARTAN POTASSIUM 50 MG/1
50 TABLET ORAL 2 TIMES DAILY
Status: DISCONTINUED | OUTPATIENT
Start: 2025-05-07 | End: 2025-05-08 | Stop reason: HOSPADM

## 2025-05-07 RX ORDER — SPIRONOLACTONE 25 MG/1
12.5 TABLET ORAL DAILY
Status: DISCONTINUED | OUTPATIENT
Start: 2025-05-07 | End: 2025-05-08 | Stop reason: HOSPADM

## 2025-05-07 RX ADMIN — LOSARTAN POTASSIUM 50 MG: 50 TABLET, FILM COATED ORAL at 22:08

## 2025-05-07 RX ADMIN — GUAIFENESIN 600 MG: 600 TABLET, EXTENDED RELEASE ORAL at 09:09

## 2025-05-07 RX ADMIN — INSULIN LISPRO 2 UNITS: 100 INJECTION, SOLUTION INTRAVENOUS; SUBCUTANEOUS at 09:10

## 2025-05-07 RX ADMIN — LEVOTHYROXINE SODIUM 75 MCG: 0.07 TABLET ORAL at 06:21

## 2025-05-07 RX ADMIN — ENOXAPARIN SODIUM 30 MG: 100 INJECTION SUBCUTANEOUS at 09:09

## 2025-05-07 RX ADMIN — INSULIN LISPRO 2 UNITS: 100 INJECTION, SOLUTION INTRAVENOUS; SUBCUTANEOUS at 22:08

## 2025-05-07 RX ADMIN — FUROSEMIDE 40 MG: 10 INJECTION, SOLUTION INTRAMUSCULAR; INTRAVENOUS at 17:34

## 2025-05-07 RX ADMIN — SODIUM CHLORIDE, PRESERVATIVE FREE 10 ML: 5 INJECTION INTRAVENOUS at 09:10

## 2025-05-07 RX ADMIN — INSULIN LISPRO 2 UNITS: 100 INJECTION, SOLUTION INTRAVENOUS; SUBCUTANEOUS at 17:34

## 2025-05-07 RX ADMIN — SPIRONOLACTONE 12.5 MG: 25 TABLET ORAL at 18:50

## 2025-05-07 RX ADMIN — METOPROLOL SUCCINATE 50 MG: 50 TABLET, EXTENDED RELEASE ORAL at 09:09

## 2025-05-07 RX ADMIN — INSULIN LISPRO 2 UNITS: 100 INJECTION, SOLUTION INTRAVENOUS; SUBCUTANEOUS at 12:34

## 2025-05-07 RX ADMIN — LOSARTAN POTASSIUM 50 MG: 50 TABLET, FILM COATED ORAL at 09:09

## 2025-05-07 RX ADMIN — DOXYCYCLINE 100 MG: 100 INJECTION, POWDER, LYOPHILIZED, FOR SOLUTION INTRAVENOUS at 17:43

## 2025-05-07 RX ADMIN — ASPIRIN 81 MG: 81 TABLET, CHEWABLE ORAL at 09:09

## 2025-05-07 RX ADMIN — GUAIFENESIN 600 MG: 600 TABLET, EXTENDED RELEASE ORAL at 22:08

## 2025-05-07 RX ADMIN — DOXYCYCLINE 100 MG: 100 INJECTION, POWDER, LYOPHILIZED, FOR SOLUTION INTRAVENOUS at 04:22

## 2025-05-07 RX ADMIN — FUROSEMIDE 40 MG: 10 INJECTION, SOLUTION INTRAMUSCULAR; INTRAVENOUS at 09:09

## 2025-05-07 NOTE — CONSULTS
[facility] CARDIOLOGY                    Cardiology Care Note     [x]Initial Encounter     []Follow-up    Patient Name: Tiera Iraheta - :1931 - MRN:[id number]  Primary Cardiologist: Sekou Dyson MD  Consulting Cardiologist: Sekou Dyson MD     Reason for encounter: CHF     HPI:       Tiera Iraheta is a 89 y.o. female with PMH significant for DM, HTN, AS, XOL admitted for dyspnea. Per pt, has noted dyspnea, LE edema and abdominal distension as well as cough productive of blood tinged sputum. Already feels improved after one dose of 40mg IV lasix in the ED. Tells me she wsa so SOB she couldn't even brush her teeth .     Subjective:      Tiera Iraheta reports dyspnea is much better, was able to wash up in bathroom this am      Assessment and Plan       1) Acute on chronic  HFmrEF  - Admitted 24 with SOB   - admitted  with SOB  - cont IV diuretics lasix 40 mg IV BID  -on losartan due to ACE-I allergy.   - Cont Jardiance    - Cont Toprol XL   - ECHO  - ischemic eval pending clinical course   - strict I/O    2) Moderate AS   - Echo 24 - LVEF 50-55%, Moderate stenosis of the aortic valve. AV mean gradient is 20 mmHg. AV area by continuity VTI is 0.9 cm2.  - Echo 24 - TDS - suboptimal study -  LVEF 40-45%, mod AS (mean PG 18 mmHg), grade 1 diastology  - Echo 24 - TDS - suboptimal study -  LVEF 40-45%, mod AS (mean PG 18 mmHg), grade 1 diastology   - ECHO today pending      3) HTN  - hypertensive on admission   - Meds as above      4) Carotid Artery disease - mild    - Carotid Doppler 18 - bilat 10-49% stenosis        5) Dyslipidemia   - cont atorvastatin        6) History of high grade Av block s/p pacer    -OP sees EP      7) Central Retinal artery occlusion   - On  - Dr. Olivo from Optho called.   Patient has a central retinal artery occlusion in left eye - she has lost 90% of vision in left eye  - Echo 18 - with stable findings (see above)   - Carotid Doppler

## 2025-05-07 NOTE — PLAN OF CARE
Problem: Physical Therapy - Adult  Goal: By Discharge: Performs mobility at highest level of function for planned discharge setting.  See evaluation for individualized goals.  Description: FUNCTIONAL STATUS PRIOR TO ADMISSION: Patient was modified independent using a rollator for functional mobility.  Dtr assisted with SBA with bathing.    HOME SUPPORT PRIOR TO ADMISSION: The patient lived with dtr and required stand-by assistance for bathing.    Physical Therapy Goals  Initiated 5/6/2025  1.  Patient will move from supine to sit and sit to supine in bed with modified independence within 7 day(s).    2.  Patient will perform sit to stand with supervision/set-up within 7 day(s).  3.  Patient will transfer from bed to chair and chair to bed with contact guard assist using the least restrictive device within 7 day(s).  4.  Patient will ambulate with contact guard assist for 150 feet with the least restrictive device within 7 day(s).   5.  Patient will ascend/descend 4 stairs with  handrail(s) with contact guard assist within 7 day(s).  5/7/2025 1643 by Conrado Romero, PT  Outcome: Progressing  5/7/2025 1643 by Conrado Romero, PT  Outcome: Progressing   PHYSICAL THERAPY TREATMENT    Patient: Tiera Iraheta (89 y.o. female)  Date: 5/7/2025  Diagnosis: Acute exacerbation of chronic heart failure (HCC) [I50.9] Acute exacerbation of chronic heart failure (HCC)      Precautions: Restrictions/Precautions  Restrictions/Precautions: Fall Risk            ASSESSMENT:  Patient continues to benefit from skilled PT services and is progressing towards goals. Discussed case with nurse and cleared for treatment. Patient able to complete bed mobility with supervision, sit to stand and gait with rolling walker stand by assist. Patient walked to the door and returned to chair. Verbal cues for pushing through bed not RW for sit to stand and to stand up straight while walking. Patient completed 1x10 ankle pumps and 1x10 heel

## 2025-05-08 VITALS
TEMPERATURE: 97.7 F | SYSTOLIC BLOOD PRESSURE: 134 MMHG | BODY MASS INDEX: 29.87 KG/M2 | HEART RATE: 57 BPM | DIASTOLIC BLOOD PRESSURE: 55 MMHG | WEIGHT: 162.3 LBS | RESPIRATION RATE: 16 BRPM | HEIGHT: 62 IN | OXYGEN SATURATION: 94 %

## 2025-05-08 LAB
ANION GAP SERPL CALC-SCNC: 8 MMOL/L (ref 2–12)
BUN SERPL-MCNC: 30 MG/DL (ref 6–20)
BUN/CREAT SERPL: 24 (ref 12–20)
CALCIUM SERPL-MCNC: 8.9 MG/DL (ref 8.5–10.1)
CHLORIDE SERPL-SCNC: 108 MMOL/L (ref 97–108)
CO2 SERPL-SCNC: 26 MMOL/L (ref 21–32)
CREAT SERPL-MCNC: 1.24 MG/DL (ref 0.55–1.02)
GLUCOSE BLD STRIP.AUTO-MCNC: 169 MG/DL (ref 65–117)
GLUCOSE BLD STRIP.AUTO-MCNC: 241 MG/DL (ref 65–117)
GLUCOSE SERPL-MCNC: 121 MG/DL (ref 65–100)
POTASSIUM SERPL-SCNC: 3.7 MMOL/L (ref 3.5–5.1)
SERVICE CMNT-IMP: ABNORMAL
SERVICE CMNT-IMP: ABNORMAL
SODIUM SERPL-SCNC: 142 MMOL/L (ref 136–145)

## 2025-05-08 PROCEDURE — 6370000000 HC RX 637 (ALT 250 FOR IP): Performed by: HOSPITALIST

## 2025-05-08 PROCEDURE — 94761 N-INVAS EAR/PLS OXIMETRY MLT: CPT

## 2025-05-08 PROCEDURE — 80048 BASIC METABOLIC PNL TOTAL CA: CPT

## 2025-05-08 PROCEDURE — 6370000000 HC RX 637 (ALT 250 FOR IP): Performed by: INTERNAL MEDICINE

## 2025-05-08 PROCEDURE — APPSS30 APP SPLIT SHARED TIME 16-30 MINUTES: Performed by: NURSE PRACTITIONER

## 2025-05-08 PROCEDURE — 6370000000 HC RX 637 (ALT 250 FOR IP): Performed by: SPECIALIST

## 2025-05-08 PROCEDURE — 82962 GLUCOSE BLOOD TEST: CPT

## 2025-05-08 PROCEDURE — 6360000002 HC RX W HCPCS: Performed by: INTERNAL MEDICINE

## 2025-05-08 RX ORDER — FUROSEMIDE 40 MG/1
40 TABLET ORAL 2 TIMES DAILY
Qty: 60 TABLET | Refills: 3 | Status: SHIPPED | OUTPATIENT
Start: 2025-05-08

## 2025-05-08 RX ORDER — DOXYCYCLINE HYCLATE 100 MG
100 TABLET ORAL EVERY 12 HOURS SCHEDULED
Qty: 10 TABLET | Refills: 0 | Status: SHIPPED | OUTPATIENT
Start: 2025-05-08 | End: 2025-05-13

## 2025-05-08 RX ORDER — SPIRONOLACTONE 25 MG/1
12.5 TABLET ORAL DAILY
Qty: 30 TABLET | Refills: 3 | Status: SHIPPED | OUTPATIENT
Start: 2025-05-09

## 2025-05-08 RX ORDER — ALBUTEROL SULFATE 90 UG/1
2 INHALANT RESPIRATORY (INHALATION) EVERY 4 HOURS PRN
Qty: 18 G | Refills: 1 | Status: SHIPPED | OUTPATIENT
Start: 2025-05-08

## 2025-05-08 RX ORDER — DOXYCYCLINE HYCLATE 100 MG
100 TABLET ORAL EVERY 12 HOURS SCHEDULED
Status: DISCONTINUED | OUTPATIENT
Start: 2025-05-08 | End: 2025-05-08 | Stop reason: HOSPADM

## 2025-05-08 RX ORDER — FUROSEMIDE 40 MG/1
40 TABLET ORAL 2 TIMES DAILY
Status: DISCONTINUED | OUTPATIENT
Start: 2025-05-08 | End: 2025-05-08 | Stop reason: HOSPADM

## 2025-05-08 RX ADMIN — FUROSEMIDE 40 MG: 40 TABLET ORAL at 08:37

## 2025-05-08 RX ADMIN — METOPROLOL SUCCINATE 50 MG: 50 TABLET, EXTENDED RELEASE ORAL at 08:37

## 2025-05-08 RX ADMIN — SPIRONOLACTONE 12.5 MG: 25 TABLET ORAL at 08:37

## 2025-05-08 RX ADMIN — GUAIFENESIN 600 MG: 600 TABLET, EXTENDED RELEASE ORAL at 08:37

## 2025-05-08 RX ADMIN — ENOXAPARIN SODIUM 30 MG: 100 INJECTION SUBCUTANEOUS at 08:36

## 2025-05-08 RX ADMIN — INSULIN LISPRO 2 UNITS: 100 INJECTION, SOLUTION INTRAVENOUS; SUBCUTANEOUS at 12:21

## 2025-05-08 RX ADMIN — LEVOTHYROXINE SODIUM 75 MCG: 0.07 TABLET ORAL at 07:04

## 2025-05-08 RX ADMIN — LOSARTAN POTASSIUM 50 MG: 50 TABLET, FILM COATED ORAL at 08:37

## 2025-05-08 RX ADMIN — ASPIRIN 81 MG: 81 TABLET, CHEWABLE ORAL at 08:37

## 2025-05-08 RX ADMIN — DOXYCYCLINE HYCLATE 100 MG: 100 TABLET, COATED ORAL at 08:37

## 2025-05-08 ASSESSMENT — PAIN SCALES - GENERAL
PAINLEVEL_OUTOF10: 0
PAINLEVEL_OUTOF10: 0

## 2025-05-08 NOTE — PLAN OF CARE
Problem: Chronic Conditions and Co-morbidities  Goal: Patient's chronic conditions and co-morbidity symptoms are monitored and maintained or improved  Outcome: Adequate for Discharge     Problem: Discharge Planning  Goal: Discharge to home or other facility with appropriate resources  Outcome: Adequate for Discharge     Problem: Pain  Goal: Verbalizes/displays adequate comfort level or baseline comfort level  Outcome: Adequate for Discharge     Problem: Safety - Adult  Goal: Free from fall injury  Outcome: Adequate for Discharge     Problem: ABCDS Injury Assessment  Goal: Absence of physical injury  Outcome: Adequate for Discharge     Problem: Skin/Tissue Integrity  Goal: Skin integrity remains intact  Description: 1.  Monitor for areas of redness and/or skin breakdown2.  Assess vascular access sites hourly3.  Every 4-6 hours minimum:  Change oxygen saturation probe site4.  Every 4-6 hours:  If on nasal continuous positive airway pressure, respiratory therapy assess nares and determine need for appliance change or resting period  Outcome: Adequate for Discharge

## 2025-05-08 NOTE — DISCHARGE SUMMARY
into the accessory hemiazygos vein in the azygos vein. The azygos vein reconstitutes the superior vena cava which is patent below the level of the azygos vein to the level of the right atrium. There are pericardial collaterals. There are collateral blood flow through the left intercostal veins at several rib levels. There is collateral blood flow through the liver as seen with increased blood flow in the medial segment of the left lobe of the liver. This is a good quality study for the evaluation of pulmonary embolism to the first subsegmental arterial level. There is no pulmonary embolism to this level. The main pulmonary artery measures 30 mm. This is enlarged and could be due to pulmonary hypertension MEDIASTINUM: No mass or lymphadenopathy. LISETTE: No mass or lymphadenopathy. THORACIC AORTA: There is aortic valve calcification present. This can be seen as a normal aging process but could cause aortic stenosis. Clinical correlation is suggested.. HEART: There is moderate cardiomegaly. There is enlargement of the all chambers of the heart especially the right atrium and left ventricle. The wall of the left ventricle is thick which could be due to left ventricular hypertrophy. Clinical correlation is suggested. Coronary artery calcifications present. There is a left subclavian pacemaker present. There is also an epicardial pacer lead at the level of the right ventricular outflow tract. There is reflux of contrast material into the inferior vena cava and hepatic veins. This can be seen with elevated right heart pressures or tricuspid regurgitation. ESOPHAGUS: No wall thickening or dilatation. TRACHEA/BRONCHI: Patent. PLEURA: There is a small right pleural effusion.. There is a small left pleural effusion. Right pleural effusion is larger than the left pleural effusion and both are small. LUNGS: There are mild changes of emphysema. Mild linear areas of scarring are noted bilaterally. No pneumonia or pulmonary edema.

## 2025-05-08 NOTE — DISCHARGE INSTRUCTIONS
Patient Discharge Instructions    Tiera MASTERSON Esequiel / [id number] : 1931    Admitted 2025 Discharged: 2025       Discharge Medications:   It is important that you take the medication exactly as they are prescribed.   Keep your medication in the bottles provided by the pharmacist and keep a list of the medication names, dosages, and times to be taken in your wallet.   Do not take other medications without consulting your doctor.   Call your PCP for medication refills      What to do at Home    Take medications as prescribed and follow up with PCP and Cardiology.  Call your PCP for refills of your medications.      Recommended Diet: cardiac diet ADULT DIET; Regular; Low Sodium (2 gm)       Recommended Activity: Activity as tolerated      **If you experience any of the following symptoms chest pain, shortness of breath, fevers, chills, and bleeding, please follow up with PCP or go to the nearest ER.**       Félix Bautista MD     May 8, 2025

## 2025-05-08 NOTE — CARE COORDINATION
1:33 PM  05/08/25    Care Management Progress Note    Reason for Admission:   Acute exacerbation of chronic heart failure (HCC) [I50.9]         Patient Admission Status: Inpatient  Date Admitted to INP:  5/5/25  RUR: Readmission Risk Score: 13.6    Hospitalization in the last 30 days (Readmission):  No        Transition of care plan:  Pt discussed during IDR- cardiology following.  Anticipated discharge plan: home with family assistance and home health; CM met face to face with pt- she is agreeable to PT/OT home health and has no agency preference. CM sent referral to [facility] via [employer] and they accepted; AVS updated and CM will sent dc summary when available.  Date IM given: 5/8  Outpatient follow-up.  Discharge transport: Family    CM will follow.    ONEIL Dubois

## 2025-05-08 NOTE — PROGRESS NOTES
[facility]  [address] [place]  [address] [phone]         Hospitalist Progress Note        NAME: Tiera Iraheta   :  1931   MRN:  [id number]    Date:  2025     Patient PCP: Cliff Orourke MD    Code Status: Full Code     Isolation Precautions: No active isolations    Barrier(s) to discharge:  Currently not medically stable for discharge  Estimated date of discharge:  2-3 days  Discharge disposition:  Home with family    Assessment/Plan:    Ms. Iraheta is a pleasant 89 yo AAM with PMH of CHF with rEF (last Echo was 40-45%) in , HTN, hypothyroidism and DM admitted for dyspnea      Acute systolic CHF  Acute dyspnea secondary to CHF dyspnea - likely 2/2 CHF exacerbation considering elevated proBNP, wheezing however with blood tinged sputum likely underlying bronchitis as well (CXR did NOT show PNA)  Improving  Continue with IV diuresis  Daily weights with strict I's and O's  Trend CE's  Continue with Doxy and Mucinex  Continue as needed bronchodilators.     DM type II, poorly controlled A1c 9.2 on 2025  Blood sugar checks with insulin sliding scale coverage  A1c 9.2    Hypothyroidism   Check TSH  Continue levothyroxine      HTN   Continue on ARB, metoprolol     Class 1 obesity (30.0 - 34.9): Body mass index is 31.15 kg/m².:    Weight loss advised      ADULT DIET; Regular; Low Sodium (2 gm)     Prophylaxis:  SCD's    Consults:   None    Plan discussed:  Pt's condition, Imaging findings, Lab findings, Assessment, and Care Plan with Patient, RN, and Care Manager    Risk of deterioration:  Moderate         Subjective:     REASON FOR VISIT:  Recheck SOB     HPI & INTERVAL HISTORY:     : Patient seen and examined up in chair reports her breathing has been improving with diuresis.  Denies chest pain, fevers, and chills.      Objective:      Vitals:    25 0732   BP: (!) 147/77   Pulse: 68   Resp: 14   Temp: 97.5 °F (36.4 °C)   SpO2: 96% 
  [facility]  [address] [place]  [address] [phone]         Hospitalist Progress Note        NAME: Tiera Iraheta   :  1931   MRN:  [id number]    Date:  2025     Patient PCP: Cliff Orourke MD    Code Status: Full Code     Isolation Precautions: No active isolations    Barrier(s) to discharge:  Currently not medically stable for discharge  Estimated date of discharge:  2-3 days  Discharge disposition:  Home with family    Assessment/Plan:    Ms. Iraheta is a pleasant 89 yo AAM with PMH of CHF with rEF (last Echo was 40-45%) in , HTN, hypothyroidism and DM admitted for dyspnea      Acute systolic CHF  Acute dyspnea secondary to CHF dyspnea - likely 2/2 CHF exacerbation considering elevated proBNP, wheezing however with blood tinged sputum likely underlying bronchitis as well (CXR did NOT show PNA)  Improving clinically and symptomatically  BNP has gone from 17.8 K on admission down to 5.9K today  Continue with IV diuresis  Daily weights with strict I's and O's  Trend CE's  Continue with Doxy and Mucinex  Continue as needed bronchodilators.  Will consult cardiology to optimize therapy and follow-up as outpatient with Dr. Candelaria her primary cardiologist     DM type II, poorly controlled A1c 9.2 on 2025  Blood sugar checks with insulin sliding scale coverage  A1c 9.2    Hypothyroidism   Check TSH  Continue levothyroxine      HTN   Continue on ARB, metoprolol     Class 1 obesity (30.0 - 34.9): Body mass index is 31.06 kg/m².:    Weight loss advised      ADULT DIET; Regular; Low Sodium (2 gm)     Prophylaxis:  SCD's    Consults:   None    Plan discussed:  Pt's condition, Imaging findings, Lab findings, Assessment, and Care Plan with Patient, RN, and Care Manager    Risk of deterioration:  Moderate         Subjective:     REASON FOR VISIT:  Recheck SOB     HPI & INTERVAL HISTORY:     : Patient seen and examined up in chair reports her breathing has 
0700:Bedside and Verbal shift change report given to Sherrie RN and Irene RN (oncoming nurse) by Nazario RN (offgoing nurse). Report included the following information Nurse Handoff Report.     1355:Reviewed discharge paperwork with patient. Allowed time for questions and clarification of which none were noted. Agreeable to discharge. IV removed.    
insulin lispro (HUMALOG,ADMELOG) injection vial 0-8 Units, 0-8 Units, SubCUTAneous, 4x Daily AC & HS, Bailey Bridges MD, 2 Units at 05/07/25 2208    levothyroxine (SYNTHROID) tablet 75 mcg, 75 mcg, Oral, QAM AC, Bailey Bridges MD, 75 mcg at 05/08/25 0704    metoprolol succinate (TOPROL XL) extended release tablet 50 mg, 50 mg, Oral, Daily, Bailey Bridges MD, 50 mg at 05/08/25 0837    hydrALAZINE (APRESOLINE) injection 5 mg, 5 mg, IntraVENous, Q6H PRN, Bailey Bridges MD    albuterol (PROVENTIL) (2.5 MG/3ML) 0.083% nebulizer solution 2.5 mg, 2.5 mg, Nebulization, Q4H PRN, Bailey Bridges MD    guaiFENesin (MUCINEX) extended release tablet 600 mg, 600 mg, Oral, BID, Bailey Bridges MD, 600 mg at 05/08/25 0837    aspirin chewable tablet 81 mg, 81 mg, Oral, Daily, Bailey Bridges MD, 81 mg at 05/08/25 0837    ERNESTINE Michaels - NP    [facility] Card[facility]  Call center: (P) [phone]  (F) [phone]      CC:Cliff Orourke MD

## 2025-05-23 ENCOUNTER — HOSPITAL ENCOUNTER (OUTPATIENT)
Facility: HOSPITAL | Age: 89
Setting detail: OBSERVATION
Discharge: HOME | End: 2025-05-24
Attending: EMERGENCY MEDICINE | Admitting: INTERNAL MEDICINE
Payer: MEDICARE

## 2025-05-23 ENCOUNTER — APPOINTMENT (OUTPATIENT)
Facility: HOSPITAL | Age: 89
End: 2025-05-23
Payer: MEDICARE

## 2025-05-23 DIAGNOSIS — I44.2 ATRIOVENTRICULAR BLOCK, COMPLETE (HCC): ICD-10-CM

## 2025-05-23 DIAGNOSIS — I44.2 HEART BLOCK AV COMPLETE (HCC): Primary | ICD-10-CM

## 2025-05-23 DIAGNOSIS — T82.111A PACEMAKER MALFUNCTION, INITIAL ENCOUNTER: ICD-10-CM

## 2025-05-23 DIAGNOSIS — R06.02 SHORTNESS OF BREATH: ICD-10-CM

## 2025-05-23 PROBLEM — R00.1 SYMPTOMATIC BRADYCARDIA: Status: ACTIVE | Noted: 2025-05-23

## 2025-05-23 LAB
ALBUMIN SERPL-MCNC: 4.2 G/DL (ref 3.5–5.2)
ALBUMIN/GLOB SERPL: 1.4 (ref 1.1–2.2)
ALP SERPL-CCNC: 64 U/L (ref 35–104)
ALT SERPL-CCNC: 10 U/L (ref 10–35)
ANION GAP SERPL CALC-SCNC: 15 MMOL/L (ref 2–12)
AST SERPL-CCNC: 23 U/L (ref 10–35)
BASOPHILS # BLD: 0.08 K/UL (ref 0–0.1)
BASOPHILS NFR BLD: 1.3 % (ref 0–1)
BILIRUB SERPL-MCNC: 0.6 MG/DL (ref 0.2–1)
BUN SERPL-MCNC: 30 MG/DL (ref 8–23)
BUN/CREAT SERPL: 20 (ref 12–20)
CALCIUM SERPL-MCNC: 9.2 MG/DL (ref 8.2–9.6)
CHLORIDE SERPL-SCNC: 100 MMOL/L (ref 98–107)
CO2 SERPL-SCNC: 22 MMOL/L (ref 22–29)
CREAT SERPL-MCNC: 1.48 MG/DL (ref 0.5–0.9)
DIFFERENTIAL METHOD BLD: ABNORMAL
ECHO BSA: 1.67 M2
ECHO BSA: 1.67 M2
EKG ATRIAL RATE: 38 BPM
EKG DIAGNOSIS: NORMAL
EKG Q-T INTERVAL: 666 MS
EKG QRS DURATION: 158 MS
EKG QTC CALCULATION (BAZETT): 529 MS
EKG R AXIS: -85 DEGREES
EKG T AXIS: 104 DEGREES
EKG VENTRICULAR RATE: 38 BPM
EOSINOPHIL # BLD: 0.18 K/UL (ref 0–0.4)
EOSINOPHIL NFR BLD: 3 % (ref 0–7)
ERYTHROCYTE [DISTWIDTH] IN BLOOD BY AUTOMATED COUNT: 15 % (ref 11.5–14.5)
EST. AVERAGE GLUCOSE BLD GHB EST-MCNC: 214 MG/DL
GLOBULIN SER CALC-MCNC: 2.9 G/DL (ref 2–4)
GLUCOSE SERPL-MCNC: 251 MG/DL (ref 65–100)
HBA1C MFR BLD: 9.1 % (ref 4–5.6)
HCT VFR BLD AUTO: 37.4 % (ref 35–47)
HGB BLD-MCNC: 13.1 G/DL (ref 11.5–16)
IMM GRANULOCYTES # BLD AUTO: 0.02 K/UL (ref 0–0.04)
IMM GRANULOCYTES NFR BLD AUTO: 0.3 % (ref 0–0.5)
LYMPHOCYTES # BLD: 1.55 K/UL (ref 0.8–3.5)
LYMPHOCYTES NFR BLD: 25.8 % (ref 12–49)
MAGNESIUM SERPL-MCNC: 1.8 MG/DL (ref 1.7–2.3)
MCH RBC QN AUTO: 26.8 PG (ref 26–34)
MCHC RBC AUTO-ENTMCNC: 35 G/DL (ref 30–36.5)
MCV RBC AUTO: 76.6 FL (ref 80–99)
MONOCYTES # BLD: 0.45 K/UL (ref 0–1)
MONOCYTES NFR BLD: 7.5 % (ref 5–13)
NEUTS SEG # BLD: 3.72 K/UL (ref 1.8–8)
NEUTS SEG NFR BLD: 62.1 % (ref 32–75)
NRBC # BLD: 0 K/UL (ref 0–0.01)
NRBC BLD-RTO: 0 PER 100 WBC
NT PRO BNP: 4624 PG/ML
PLATELET # BLD AUTO: 185 K/UL (ref 150–400)
PMV BLD AUTO: 12.4 FL (ref 8.9–12.9)
POTASSIUM SERPL-SCNC: 4.9 MMOL/L (ref 3.5–5.1)
PROT SERPL-MCNC: 7.1 G/DL (ref 6.4–8.3)
RBC # BLD AUTO: 4.88 M/UL (ref 3.8–5.2)
SODIUM SERPL-SCNC: 137 MMOL/L (ref 136–145)
TROPONIN T SERPL HS-MCNC: 71 NG/L (ref 0–14)
TROPONIN T SERPL HS-MCNC: 77.6 NG/L (ref 0–14)
TSH SERPL DL<=0.05 MIU/L-ACNC: 1.84 UIU/ML (ref 0.27–4.2)
WBC # BLD AUTO: 6 K/UL (ref 3.6–11)

## 2025-05-23 PROCEDURE — 94761 N-INVAS EAR/PLS OXIMETRY MLT: CPT

## 2025-05-23 PROCEDURE — 2060000000 HC ICU INTERMEDIATE R&B

## 2025-05-23 PROCEDURE — 99152 MOD SED SAME PHYS/QHP 5/>YRS: CPT | Performed by: INTERNAL MEDICINE

## 2025-05-23 PROCEDURE — 80053 COMPREHEN METABOLIC PANEL: CPT

## 2025-05-23 PROCEDURE — 84443 ASSAY THYROID STIM HORMONE: CPT

## 2025-05-23 PROCEDURE — C1760 CLOSURE DEV, VASC: HCPCS | Performed by: INTERNAL MEDICINE

## 2025-05-23 PROCEDURE — 2709999900 HC NON-CHARGEABLE SUPPLY: Performed by: INTERNAL MEDICINE

## 2025-05-23 PROCEDURE — 2500000003 HC RX 250 WO HCPCS: Performed by: INTERNAL MEDICINE

## 2025-05-23 PROCEDURE — 83880 ASSAY OF NATRIURETIC PEPTIDE: CPT

## 2025-05-23 PROCEDURE — C1786 PMKR, SINGLE, RATE-RESP: HCPCS | Performed by: INTERNAL MEDICINE

## 2025-05-23 PROCEDURE — 6360000002 HC RX W HCPCS: Performed by: INTERNAL MEDICINE

## 2025-05-23 PROCEDURE — 2500000003 HC RX 250 WO HCPCS

## 2025-05-23 PROCEDURE — 83036 HEMOGLOBIN GLYCOSYLATED A1C: CPT

## 2025-05-23 PROCEDURE — 83735 ASSAY OF MAGNESIUM: CPT

## 2025-05-23 PROCEDURE — 99285 EMERGENCY DEPT VISIT HI MDM: CPT

## 2025-05-23 PROCEDURE — 71045 X-RAY EXAM CHEST 1 VIEW: CPT

## 2025-05-23 PROCEDURE — 99222 1ST HOSP IP/OBS MODERATE 55: CPT | Performed by: SPECIALIST

## 2025-05-23 PROCEDURE — 33210 INSERT ELECTRD/PM CATH SNGL: CPT | Performed by: INTERNAL MEDICINE

## 2025-05-23 PROCEDURE — 76937 US GUIDE VASCULAR ACCESS: CPT | Performed by: INTERNAL MEDICINE

## 2025-05-23 PROCEDURE — 6370000000 HC RX 637 (ALT 250 FOR IP): Performed by: INTERNAL MEDICINE

## 2025-05-23 PROCEDURE — 84484 ASSAY OF TROPONIN QUANT: CPT

## 2025-05-23 PROCEDURE — 36005 INJECTION EXT VENOGRAPHY: CPT | Performed by: INTERNAL MEDICINE

## 2025-05-23 PROCEDURE — 99223 1ST HOSP IP/OBS HIGH 75: CPT | Performed by: INTERNAL MEDICINE

## 2025-05-23 PROCEDURE — C1769 GUIDE WIRE: HCPCS | Performed by: INTERNAL MEDICINE

## 2025-05-23 PROCEDURE — 36415 COLL VENOUS BLD VENIPUNCTURE: CPT

## 2025-05-23 PROCEDURE — 85025 COMPLETE CBC W/AUTO DIFF WBC: CPT

## 2025-05-23 PROCEDURE — 93005 ELECTROCARDIOGRAM TRACING: CPT

## 2025-05-23 PROCEDURE — 33274 TCAT INSJ/RPL PERM LDLS PM: CPT | Performed by: INTERNAL MEDICINE

## 2025-05-23 PROCEDURE — 99153 MOD SED SAME PHYS/QHP EA: CPT | Performed by: INTERNAL MEDICINE

## 2025-05-23 PROCEDURE — 75822 VEIN X-RAY ARMS/LEGS: CPT | Performed by: INTERNAL MEDICINE

## 2025-05-23 PROCEDURE — C1894 INTRO/SHEATH, NON-LASER: HCPCS | Performed by: INTERNAL MEDICINE

## 2025-05-23 DEVICE — BIPOLAR PACING CATHETER
Type: IMPLANTABLE DEVICE | Status: FUNCTIONAL
Brand: PACEL™

## 2025-05-23 DEVICE — TPS MC2AVR1 MICRA AV2 US
Type: IMPLANTABLE DEVICE | Status: FUNCTIONAL
Brand: MICRA™ AV2

## 2025-05-23 RX ORDER — ONDANSETRON 4 MG/1
4 TABLET, ORALLY DISINTEGRATING ORAL EVERY 8 HOURS PRN
Status: DISCONTINUED | OUTPATIENT
Start: 2025-05-23 | End: 2025-05-24 | Stop reason: HOSPADM

## 2025-05-23 RX ORDER — ENOXAPARIN SODIUM 100 MG/ML
30 INJECTION SUBCUTANEOUS DAILY
Status: DISCONTINUED | OUTPATIENT
Start: 2025-05-23 | End: 2025-05-23

## 2025-05-23 RX ORDER — ENOXAPARIN SODIUM 100 MG/ML
30 INJECTION SUBCUTANEOUS DAILY
Status: DISCONTINUED | OUTPATIENT
Start: 2025-05-24 | End: 2025-05-24 | Stop reason: HOSPADM

## 2025-05-23 RX ORDER — ACETAMINOPHEN 325 MG/1
650 TABLET ORAL EVERY 6 HOURS PRN
Status: DISCONTINUED | OUTPATIENT
Start: 2025-05-23 | End: 2025-05-24 | Stop reason: HOSPADM

## 2025-05-23 RX ORDER — SODIUM CHLORIDE 9 MG/ML
INJECTION, SOLUTION INTRAVENOUS PRN
Status: DISCONTINUED | OUTPATIENT
Start: 2025-05-23 | End: 2025-05-24 | Stop reason: HOSPADM

## 2025-05-23 RX ORDER — FENTANYL CITRATE 50 UG/ML
INJECTION, SOLUTION INTRAMUSCULAR; INTRAVENOUS PRN
Status: DISCONTINUED | OUTPATIENT
Start: 2025-05-23 | End: 2025-05-23 | Stop reason: HOSPADM

## 2025-05-23 RX ORDER — SODIUM CHLORIDE 0.9 % (FLUSH) 0.9 %
5-40 SYRINGE (ML) INJECTION PRN
Status: DISCONTINUED | OUTPATIENT
Start: 2025-05-23 | End: 2025-05-24 | Stop reason: HOSPADM

## 2025-05-23 RX ORDER — ONDANSETRON 2 MG/ML
4 INJECTION INTRAMUSCULAR; INTRAVENOUS EVERY 6 HOURS PRN
Status: DISCONTINUED | OUTPATIENT
Start: 2025-05-23 | End: 2025-05-24 | Stop reason: HOSPADM

## 2025-05-23 RX ORDER — ACETAMINOPHEN 325 MG/1
650 TABLET ORAL EVERY 4 HOURS PRN
Status: DISCONTINUED | OUTPATIENT
Start: 2025-05-23 | End: 2025-05-24 | Stop reason: HOSPADM

## 2025-05-23 RX ORDER — ASPIRIN 81 MG/1
81 TABLET, CHEWABLE ORAL DAILY
Status: DISCONTINUED | OUTPATIENT
Start: 2025-05-23 | End: 2025-05-23

## 2025-05-23 RX ORDER — POLYETHYLENE GLYCOL 3350 17 G/17G
17 POWDER, FOR SOLUTION ORAL DAILY PRN
Status: DISCONTINUED | OUTPATIENT
Start: 2025-05-23 | End: 2025-05-24 | Stop reason: HOSPADM

## 2025-05-23 RX ORDER — ACETAMINOPHEN 650 MG/1
650 SUPPOSITORY RECTAL EVERY 6 HOURS PRN
Status: DISCONTINUED | OUTPATIENT
Start: 2025-05-23 | End: 2025-05-24 | Stop reason: HOSPADM

## 2025-05-23 RX ORDER — SODIUM CHLORIDE 0.9 % (FLUSH) 0.9 %
5-40 SYRINGE (ML) INJECTION EVERY 12 HOURS SCHEDULED
Status: DISCONTINUED | OUTPATIENT
Start: 2025-05-23 | End: 2025-05-24 | Stop reason: HOSPADM

## 2025-05-23 RX ORDER — LIDOCAINE HYDROCHLORIDE 10 MG/ML
INJECTION, SOLUTION INFILTRATION; PERINEURAL PRN
Status: DISCONTINUED | OUTPATIENT
Start: 2025-05-23 | End: 2025-05-23 | Stop reason: HOSPADM

## 2025-05-23 RX ORDER — MIDAZOLAM HYDROCHLORIDE 1 MG/ML
INJECTION, SOLUTION INTRAMUSCULAR; INTRAVENOUS PRN
Status: DISCONTINUED | OUTPATIENT
Start: 2025-05-23 | End: 2025-05-23 | Stop reason: HOSPADM

## 2025-05-23 RX ORDER — HEPARIN SODIUM 1000 [USP'U]/ML
INJECTION, SOLUTION INTRAVENOUS; SUBCUTANEOUS PRN
Status: DISCONTINUED | OUTPATIENT
Start: 2025-05-23 | End: 2025-05-23 | Stop reason: HOSPADM

## 2025-05-23 RX ORDER — LEVOTHYROXINE SODIUM 50 UG/1
50 TABLET ORAL
Status: DISCONTINUED | OUTPATIENT
Start: 2025-05-24 | End: 2025-05-24 | Stop reason: HOSPADM

## 2025-05-23 RX ORDER — ASPIRIN 81 MG/1
81 TABLET ORAL DAILY
Status: DISCONTINUED | OUTPATIENT
Start: 2025-05-23 | End: 2025-05-24 | Stop reason: HOSPADM

## 2025-05-23 RX ADMIN — ASPIRIN 81 MG: 81 TABLET, COATED ORAL at 08:23

## 2025-05-23 RX ADMIN — CEPHALEXIN 250 MG: 250 CAPSULE ORAL at 15:48

## 2025-05-23 RX ADMIN — CEPHALEXIN 250 MG: 250 CAPSULE ORAL at 21:06

## 2025-05-23 RX ADMIN — SODIUM CHLORIDE, PRESERVATIVE FREE 10 ML: 5 INJECTION INTRAVENOUS at 21:06

## 2025-05-23 RX ADMIN — SODIUM CHLORIDE, PRESERVATIVE FREE 10 ML: 5 INJECTION INTRAVENOUS at 21:07

## 2025-05-23 ASSESSMENT — ENCOUNTER SYMPTOMS
GASTROINTESTINAL NEGATIVE: 1
SHORTNESS OF BREATH: 1
EYES NEGATIVE: 1

## 2025-05-23 ASSESSMENT — PAIN - FUNCTIONAL ASSESSMENT: PAIN_FUNCTIONAL_ASSESSMENT: NONE - DENIES PAIN

## 2025-05-23 NOTE — ACP (ADVANCE CARE PLANNING)
Advance Care Planning (ACP) Provider Note - Comprehensive      Date of ACP Conversation:  05/23/25    Persons included in Conversation:  patient   Length of ACP Conversation in minutes:  16 minutes     Authorized Decision Maker (if patient is incapable of making informed decisions):   This person is: Ms. Iraheta       General ACP for ALL Patients with Decision Making Capacity:  Importance of advance care planning, including choosing a healthcare agent to communicate patient's healthcare decisions if patient lost the ability to make decisions.    Review of Existing Advance Directive:  Patient and family do not have an advance directive readily available for review.       Active Diagnoses:   Pacemaker malfunction    These active diagnoses are of sufficient risk that focused discussion on advance care planning is indicated in order to allow the patient to thoughtfully consider personal goals of care; and, if situations arise that prevent the ability to personally give input, to ensure appropriate representation of their personal desires for different levels and aggressiveness of care.      For Serious or Chronic Illness:  Understanding of medical condition     Reviewed pt's clinical status. Tiera Iraheta has multiple medical problems including DM, HTN, hyperlipidemia, bradycardia and is being admitted for symptomatic bradycardia. We reviewed our treatment plan and anticipated discharge plans. Further, we discussed pt's wishes on how she would like to proceed (aggressive/heroic resuscitation vs not intervening and allowing nature takes its course) if she were to suffer cardiopulmonary arrest.    Understanding of CPR, goals and expected outcomes, benefits and burdens discussed.  Information on CPR success provided (many factors lower a patient’s chance of survival, including advanced age, performance status, malignancy, and presence of multiple comorbidities); Individual asked to communicate understanding of information in

## 2025-05-23 NOTE — PROGRESS NOTES
11:00 am  TRANSFER - IN REPORT:    Verbal report received from Mague on Tiera Iraheta  being received from  for routine post-op      Report consisted of patient's Situation, Background, Assessment and   Recommendations(SBAR).     Information from the following report(s) Nurse Handoff Report was reviewed with the receiving nurse.    Opportunity for questions and clarification was provided.      Assessment completed upon patient's arrival to unit and care assumed.       3:10 PM  TRANSFER - OUT REPORT:    Verbal report given to Tatyana on Tiera Iraheta  being transferred to [facility] for routine post-op       Report consisted of patient's Situation, Background, Assessment and   Recommendations(SBAR).     Information from the following report(s) Nurse Handoff Report was reviewed with the receiving nurse.           Lines:   Peripheral IV 05/23/25 Posterior;Right Wrist (Active)   Site Assessment Clean, dry & intact 05/23/25 0419   Line Status Blood return noted 05/23/25 0419       Peripheral IV 05/23/25 Left;Posterior Forearm (Active)   Site Assessment Clean, dry & intact 05/23/25 0419   Line Status Blood return noted 05/23/25 0419        Opportunity for questions and clarification was provided.      Patient transported with:  Registered Nurse    KIMO and PCC reji at patient bedside to assess patient and site. Bilateral sites clean, dry, and intact. Patient did not say that she is a DM, but was in her chart, will verify.

## 2025-05-23 NOTE — PLAN OF CARE
Problem: Safety - Adult  Goal: Free from fall injury  Outcome: Progressing     Problem: Chronic Conditions and Co-morbidities  Goal: Patient's chronic conditions and co-morbidity symptoms are monitored and maintained or improved  Outcome: Progressing  Flowsheets (Taken 5/23/2025 1504)  Care Plan - Patient's Chronic Conditions and Co-Morbidity Symptoms are Monitored and Maintained or Improved: Monitor and assess patient's chronic conditions and comorbid symptoms for stability, deterioration, or improvement     Problem: Discharge Planning  Goal: Discharge to home or other facility with appropriate resources  Outcome: Progressing  Flowsheets (Taken 5/23/2025 1504)  Discharge to home or other facility with appropriate resources: Identify barriers to discharge with patient and caregiver

## 2025-05-23 NOTE — ED NOTES
Bedside shift change report given to Austyn AZEVEDO (oncoming nurse) by Karla (offgoing nurse). Report included the following information ED Encounter Summary, ED SBAR, Adult Overview, MAR, Recent Results, and Med Rec Status.

## 2025-05-23 NOTE — H&P
[facility]  [address] [place]  [address] [phone]    Hospital Medicine Admission History and Physical      NAME:  Tiera Iraheta   :   1931   MRN:  [id number]     PCP:  Cliff Orourke MD     Date of service:  2025         Subjective:     CHIEF COMPLAINT: Dizziness, syncope    HISTORY OF PRESENT ILLNESS:     Ms. Iraheta is a 89 y.o.   female who is admitted with symptomatic bradycardia.  Ms. Iraheta with past medical history of ALS, heart block s/p pacemaker, DM, HTN presented to ER complaining of dizziness and syncope, which happened this morning.  Recently patient has been feeling dizzy, lightheaded and fall.  Today, patient continued to be fatigued and started to have chest pain and shortness of breath.  In ER, it was noted to be her pacemaker is malfunctioning.    Past Medical History:   Diagnosis Date    Aortic stenosis     Central retinal artery occlusion      - lost 90% of vision of left eye    Coronary atherosclerosis of native coronary artery     adeno card 2008: fixed inf defect, EF 47%. Echo 2008: EF 55%, mild MR.    Diabetes (HCC)     HFrEF (heart failure with reduced ejection fraction) (HCC)     HTN (hypertension)     Mobitz (type) II atrioventricular block     Pure hypercholesterolemia         Past Surgical History:   Procedure Laterality Date    PACEMAKER  2009    Ventricular lead failure. No access through stenosed left subclavian. No access from right side due to stenosis of SVC-RA junction.     PACEMAKER  2008    DDD, V lead Fx 2009.    PACEMAKER  2008    Gen Change       Social History     Tobacco Use    Smoking status: Former    Smokeless tobacco: Never   Substance Use Topics    Alcohol use: Yes     Comment: occassionally        Family History   Problem Relation Age of Onset    Hypertension Mother         Allergies   Allergen Reactions    Jardiance [Empagliflozin]      Dizziness, extreme thirst    Simvastatin

## 2025-05-23 NOTE — CONSULTS
[facility] CARDIOLOGY                     Cardiac EP Hospital Care Note     [x]Initial Encounter     [ ]Follow-up     Patient Name: Tiera Iraheta - :1931 - MRN:[id number]   Primary Cardiologist: Sekou Dyson MD   Consulting Cardiologist: Navdeep Villareal MD     Reason for encounter: complete AV block, RV lead fracture, failure to capture     HPI:       Tiera Iraheta is a 89 y.o. female with PMH significant for Mobitz type II AV block s/p medtronic dual chamber PPM, Mod AS, Chronic HFrEF(40-45% 2024), HTN, HLD initially presented to [facility] with complaints of intermittent shortness of breath and chest pain with dizziness over the past few days. On arrival to ED bradycardic in the 30's    EKG demonstrated sinus rhythm and third degree AV block with slow ventricular escape rate 38 bpm. RV pacing lead was checked by [employer] rep last night and it showed the RV pacing lead fractured with pacing impedance 9900 ohms and cannot pace. EP was consulted and I made multiple calls to [facility], spoke with ER attending, one call transfer center and [facility] cath lab to have the patient transferred to [facility] for procedure      Assessment and Plan     Complete AV block/Pacer failure to capture/ due to RV pacing Lead fracture   ventricular escape rate 38bpm    <2 months left battery   She has current epicardial lead fracture and previous endocardial RV lead also fractured and abandoned  Later it is known that she has no venous flow in upper chest to place new transvenous lead    Chronic HFrEF   - Echo 2025 (LVEF 35-40%).   Cannot have biventricular pacer due to lack of upper arm/chest venous accesses    Mod aortic stenosis  - monitored echos    Dyslipidemia   - continue atrovastatin     Future Appointments   Date Time Provider Department Center   2025 11:00 AM PACEMAKER, [facility] AMB   2025 10:00 AM eSkou Dyson MD [facility] AMB   2025 11:00 AM [facility] ECHO 1 [facility] AMB   9/10/2025  9:00 AM

## 2025-05-23 NOTE — PROCEDURES
PROCEDURE NOTE  Date: 5/23/2025   Name: Tiera Iraheta  YOB: 1931    Procedures    Cardiac Procedure Note   Patient: Tiera Iraheta  MRN: [id number]  SSN: [id number]   YOB: 1931 Age: 89 y.o.  Sex: female    Date of Procedure: 5/23/2025   Pre-procedure Diagnosis: pacemaker RV lead fracture, third degree av block  Battery of pacer 1-2 month left  Post-procedure Diagnosis: same  Procedure: venogram of both arms  Leadless pacemaker implant  :  Dr. Navdeep Villareal MD    Assistant(s):  None  Anesthesia: Moderate Sedation   Estimated Blood Loss: Less than 10 mL   Specimens Removed: None  Findings:   Left subclavian vein occluded  Right vein with occlusion when entering RA SVC  Epicardial lead is fractured  Old RV pacer lead was fractured  Old RA lead present    Leadless pacer was implanted  AV micra    Known LVEF 40-45% but no access for BIV pacer       Complications: None   Implants: Medtronic AV micra leadless pacemaker    Signed by:  Navdeep Villareal MD  5/23/2025  11:50 AM

## 2025-05-23 NOTE — ED TRIAGE NOTES
Pt arrived to facility with family members in facility wheelchair with cc of increased dizziness, feeling faint and having a fall this morning one hour prior to arrival. Patient reports she has a pacemaker and knows it is due to be changed and feels \" its fading out\"  and she doesn't feel it is working properly and believes this is contributing to her feeling badly. Reports the past several days she has been experiencing overall feeling weak and drained and she tires easily with minimal tasks. Reports she has been feeling short of breath easily as well. Reports left sided chest pain off and on for 2 to 3 days she reports as feeling pressure like. Patient reports this morning she was sitting on the toilet did not have a bowel movement but reports she passed out and fell off the toilet. Denies injury with falling. Denies knowingly hitting her head. No injuries reports or observed in triage.     Patient assisted to stretcher with standby assist. Patient weak with ambulation. Initial assessment shows HR in the 30s.

## 2025-05-23 NOTE — DISCHARGE INSTRUCTIONS
Leadless Pacemaker Implant  Discharge Instructions      Please make sure you have received your Temporary Pacemaker identification card with your discharge instructions      MEDICATIONS        Take only the medications prescribed to you at discharge.      WHAT TO EXPECT     The device was placed through your groin.     Mild to moderate, non-painful, bruising or mild swelling at the puncture site is not uncommon; it should resolve in 7 - 14 days, and may extend down your thigh as it heals. Application of ice to the site may help with any tenderness.    You have a small gauze dressing applied to the puncture site in your groin.  You may remove this the following morning.     ACTIVITY        Return to your normal activity, except as noted below.    Do not lift anything greater than 10 pounds for 7 days.  Avoid driving for 48 hours.   Rest quietly for the remainder of the day.  Limit bending at the puncture site and minimize use of stairs for at least 2 days.  You may remove the bandage and shower the morning after the procedure.  Do not take a bath for 3 days.      SHOWERING        It is important to keep the bandaged area clean and dry.  You may remove the bandage and shower the morning after the procedure. Thereafter, you may shower after the bandage is removed. Do not apply any lotions, powders, or perfumes to the incision line.    Avoid submerging your incision in water (tub baths, hot tubs, or swimming) for four weeks.       DISCHARGE PRECAUTIONS        You can have an MRI after 6 weeks.  You must be aware that any strong magnet or magnetic field can affect your Pacemaker.  In general, be careful of metal detectors, heavy machinery, and any area where arc-welding is performed.  When approaching a security checkpoint show your Pacemaker ID Card to security personnel.    Always tell your doctor or dentist that you have a Pacemaker.  In some cases, antibiotics may be prescribed before certain procedures.    Your

## 2025-05-23 NOTE — ED PROVIDER NOTES
[place] EMERGENCY DEPARTMENT  EMERGENCY DEPARTMENT ENCOUNTER      Pt Name: Tiera Iraheta  MRN: [id number]  Birthdate 7/8/1931  Date of evaluation: 5/23/2025  Provider: Seth Teixeira MD    CHIEF COMPLAINT       Chief Complaint   Patient presents with    Fall    Dizziness    Chest Pain         HISTORY OF PRESENT ILLNESS   (Location/Symptom, Timing/Onset, Context/Setting, Quality, Duration, Modifying Factors, Severity)  Note limiting factors.   89-year-old female with PMHx of Mobitz 2 AV block status post pacemaker placement, aortic stenosis, CAD, DM, and HTN presents to the emergency department complaining of intermittent shortness of breath and chest pain over the last few days and persistent presyncopal dizziness since tonight.  She has no additional complaints at this time    The history is provided by the patient and a relative.         Review of External Medical Records:     Nursing Notes were reviewed.    REVIEW OF SYSTEMS    (2-9 systems for level 4, 10 or more for level 5)     Review of Systems   Constitutional: Negative.    HENT: Negative.     Eyes: Negative.    Respiratory:  Positive for shortness of breath.    Cardiovascular:  Positive for chest pain.   Gastrointestinal: Negative.    Genitourinary: Negative.    Musculoskeletal: Negative.    Skin: Negative.    Neurological:  Positive for dizziness.   Psychiatric/Behavioral: Negative.         Except as noted above the remainder of the review of systems was reviewed and negative.       PAST MEDICAL HISTORY     Past Medical History:   Diagnosis Date    Central retinal artery occlusion     12/18 - lost 90% of vision of left eye    Coronary atherosclerosis of native coronary artery     adeno card 2008: fixed inf defect, EF 47%. Echo 2008: EF 55%, mild MR.    Diabetes (HCC)     HTN (hypertension)     Mild aortic stenosis     Mobitz (type) II atrioventricular block     Pure hypercholesterolemia          SURGICAL HISTORY       Past Surgical History:   Procedure

## 2025-05-23 NOTE — CONSULTS
[facility] CARDIOLOGY                       Cardiology Care Note     [x]Initial Encounter     []Follow-up    Patient Name: Tiera Iraheta - :1931 - MRN:[id number]  Primary Cardiologist: Sekou Dyson MD  Consulting Cardiologist: Sekou Dyson MD     Reason for encounter:        HPI:       Tiera Iraheta is a 89 y.o. female with PMH significant for                Assessment and Plan       Pacer Failure to capture   - History of pacer for high grade Av block   - p/w dizziness / near syncope / weakness   - EKG shows pacer failure to capture   ---> proceeded with pacer with EP --> See Dr. Villareal's notes       Chronic HFrEF  - Echo 25 - LVEF 35-40%  - HFrEF GDMT:  --> Resume GDMT by discharge       - ACE/ARB/ARNI:  losartan 50 mg (unable to use ARNI due to Angioedema on ACE-I)      - Beta Blocker: Toprol XL 50 mg daily       - MRA: aldactone 12.5 mg daily       - SGLT2i:  Allergy to Jardiance       - lasix 40 mg BID        Moderate AS  - Echo 25 - LVEF 35-40%, moderate Aortic stenosis  - follow OP         Dyslipidemia    - cont atorvastatin          I will sign off.  I see her back in clinic .   Defer EP management to Dr. Villareal.          ____________________________________________________________    Cardiac testing        EKG 25 - sinus, complete heart block, ventricular escape rhythm at 38 bpm, failure to capture       Prior Cardiac Workup Includes:    Echo 24 - TDS - suboptimal study -  LVEF 40-45%, mod AS (mean PG 18 mmHg), grade 1 diastology      Echo 25 - LVEF 35-40%, moderate Aortic stenosis         Past Medical History:  Past Medical History:   Diagnosis Date    Aortic stenosis     Central retinal artery occlusion      - lost 90% of vision of left eye    Coronary atherosclerosis of native coronary artery     adeno card : fixed inf defect, EF 47%. Echo : EF 55%, mild MR.    Diabetes (HCC)     HFrEF (heart failure  BPM    QRS Duration 158 ms    Q-T Interval 666 ms    QTc Calculation (Bazett) 529 ms    R Axis -85 degrees    T Axis 104 degrees    Diagnosis          CBC with Auto Differential    Collection Time: 05/23/25  4:17 AM   Result Value Ref Range    WBC 6.0 3.6 - 11.0 K/uL    RBC 4.88 3.80 - 5.20 M/uL    Hemoglobin 13.1 11.5 - 16.0 g/dL    Hematocrit 37.4 35.0 - 47.0 %    MCV 76.6 (L) 80.0 - 99.0 FL    MCH 26.8 26.0 - 34.0 PG    MCHC 35.0 30.0 - 36.5 g/dL    RDW 15.0 (H) 11.5 - 14.5 %    Platelets 185 150 - 400 K/uL    MPV 12.4 8.9 - 12.9 FL    Nucleated RBCs 0.0 0  WBC    nRBC 0.00 0.00 - 0.01 K/uL    Neutrophils % 62.1 32.0 - 75.0 %    Lymphocytes % 25.8 12.0 - 49.0 %    Monocytes % 7.5 5.0 - 13.0 %    Eosinophils % 3.0 0.0 - 7.0 %    Basophils % 1.3 (H) 0.0 - 1.0 %    Immature Granulocytes % 0.3 0 - 0.5 %    Neutrophils Absolute 3.72 1.80 - 8.00 K/UL    Lymphocytes Absolute 1.55 0.80 - 3.50 K/UL    Monocytes Absolute 0.45 0.00 - 1.00 K/UL    Eosinophils Absolute 0.18 0.00 - 0.40 K/UL    Basophils Absolute 0.08 0.00 - 0.10 K/UL    Immature Granulocytes Absolute 0.02 0.00 - 0.04 K/UL    Differential Type AUTOMATED     CMP    Collection Time: 05/23/25  4:17 AM   Result Value Ref Range    Sodium 137 136 - 145 mmol/L    Potassium 4.9 3.5 - 5.1 mmol/L    Chloride 100 98 - 107 mmol/L    CO2 22 22 - 29 mmol/L    Anion Gap 15 (H) 2 - 12 mmol/L    Glucose 251 (H) 65 - 100 mg/dL    BUN 30 (H) 8 - 23 MG/DL    Creatinine 1.48 (H) 0.50 - 0.90 MG/DL    BUN/Creatinine Ratio 20 12 - 20      Est, Glom Filt Rate 33 (L) >60 ml/min/1.73m2    Calcium 9.2 8.2 - 9.6 MG/DL    Total Bilirubin 0.6 0.2 - 1.0 MG/DL    ALT 10 10 - 35 U/L    AST 23 10 - 35 U/L    Alk Phosphatase 64 35 - 104 U/L    Total Protein 7.1 6.4 - 8.3 g/dL    Albumin 4.2 3.5 - 5.2 g/dL    Globulin 2.9 2.0 - 4.0 g/dL    Albumin/Globulin Ratio 1.4 1.1 - 2.2     Troponin T - [place]    Collection Time: 05/23/25  4:17 AM   Result Value Ref Range    Troponin T 77.6 (H) 0

## 2025-05-23 NOTE — ED NOTES
TRANSFER - OUT REPORT:    Verbal report given to Marianne Garza and  on Tiera Iraheta  being transferred to [facility] and the [facility]  for routine progression of patient care       Report given by Tiffany AZEVEDO to [facility] and Cath lab.     Report consisted of patient's Situation, Background, Assessment and   Recommendations(SBAR).     Information from the following report(s) Nurse Handoff Report, ED Encounter Summary, ED SBAR, Adult Overview, MAR, Recent Results, Med Rec Status, and Cardiac Rhythm Sinus Riccardo   was reviewed with the receiving nurse.    Egan Fall Assessment:    Presents to emergency department  because of falls (Syncope, seizure, or loss of consciousness): Yes  Age > 70: Yes  Altered Mental Status, Intoxication with alcohol or substance confusion (Disorientation, impaired judgment, poor safety awaremess, or inability to follow instructions): No  Impaired Mobility: Ambulates or transfers with assistive devices or assistance; Unable to ambulate or transer.: Yes  Nursing Judgement: No          Lines:   Peripheral IV 05/23/25 Posterior;Right Wrist (Active)   Site Assessment Clean, dry & intact 05/23/25 0419   Line Status Blood return noted 05/23/25 0419       Peripheral IV 05/23/25 Left;Posterior Forearm (Active)   Site Assessment Clean, dry & intact 05/23/25 0419   Line Status Blood return noted 05/23/25 0419        Opportunity for questions and clarification was provided.      Patient transported with:  Monitor

## 2025-05-23 NOTE — ED NOTES
Bedside and Verbal shift change report given to Austyn RN (oncoming nurse) by Karla RN (offgoing nurse). Report included the following information Nurse Handoff Report.

## 2025-05-24 ENCOUNTER — APPOINTMENT (OUTPATIENT)
Facility: HOSPITAL | Age: 89
End: 2025-05-24
Attending: INTERNAL MEDICINE
Payer: MEDICARE

## 2025-05-24 VITALS
DIASTOLIC BLOOD PRESSURE: 87 MMHG | OXYGEN SATURATION: 96 % | TEMPERATURE: 97.2 F | SYSTOLIC BLOOD PRESSURE: 170 MMHG | HEART RATE: 87 BPM | HEIGHT: 62 IN | RESPIRATION RATE: 20 BRPM | BODY MASS INDEX: 25.76 KG/M2 | WEIGHT: 140 LBS

## 2025-05-24 PROBLEM — R00.1 BRADYCARDIA: Status: ACTIVE | Noted: 2025-05-24

## 2025-05-24 LAB
ANION GAP SERPL CALC-SCNC: 6 MMOL/L (ref 2–12)
BASOPHILS # BLD: 0.04 K/UL (ref 0–0.1)
BASOPHILS NFR BLD: 0.7 % (ref 0–1)
BUN SERPL-MCNC: 25 MG/DL (ref 6–20)
BUN/CREAT SERPL: 20 (ref 12–20)
CALCIUM SERPL-MCNC: 8.9 MG/DL (ref 8.5–10.1)
CHLORIDE SERPL-SCNC: 108 MMOL/L (ref 97–108)
CHOLEST SERPL-MCNC: 166 MG/DL
CO2 SERPL-SCNC: 25 MMOL/L (ref 21–32)
CREAT SERPL-MCNC: 1.25 MG/DL (ref 0.55–1.02)
DIFFERENTIAL METHOD BLD: ABNORMAL
EOSINOPHIL # BLD: 0.18 K/UL (ref 0–0.4)
EOSINOPHIL NFR BLD: 3.1 % (ref 0–7)
ERYTHROCYTE [DISTWIDTH] IN BLOOD BY AUTOMATED COUNT: 14.9 % (ref 11.5–14.5)
GLUCOSE SERPL-MCNC: 254 MG/DL (ref 65–100)
HCT VFR BLD AUTO: 34.5 % (ref 35–47)
HDLC SERPL-MCNC: 51 MG/DL
HDLC SERPL: 3.3 (ref 0–5)
HGB BLD-MCNC: 12.1 G/DL (ref 11.5–16)
IMM GRANULOCYTES # BLD AUTO: 0.02 K/UL (ref 0–0.04)
IMM GRANULOCYTES NFR BLD AUTO: 0.3 % (ref 0–0.5)
LDLC SERPL CALC-MCNC: 75.8 MG/DL (ref 0–100)
LYMPHOCYTES # BLD: 1.32 K/UL (ref 0.8–3.5)
LYMPHOCYTES NFR BLD: 22.8 % (ref 12–49)
MCH RBC QN AUTO: 26.8 PG (ref 26–34)
MCHC RBC AUTO-ENTMCNC: 35.1 G/DL (ref 30–36.5)
MCV RBC AUTO: 76.5 FL (ref 80–99)
MONOCYTES # BLD: 0.57 K/UL (ref 0–1)
MONOCYTES NFR BLD: 9.9 % (ref 5–13)
NEUTS SEG # BLD: 3.65 K/UL (ref 1.8–8)
NEUTS SEG NFR BLD: 63.2 % (ref 32–75)
NRBC # BLD: 0 K/UL (ref 0–0.01)
NRBC BLD-RTO: 0 PER 100 WBC
PHOSPHATE SERPL-MCNC: 3.6 MG/DL (ref 2.6–4.7)
PLATELET # BLD AUTO: 174 K/UL (ref 150–400)
PMV BLD AUTO: 12.6 FL (ref 8.9–12.9)
POTASSIUM SERPL-SCNC: 4.4 MMOL/L (ref 3.5–5.1)
RBC # BLD AUTO: 4.51 M/UL (ref 3.8–5.2)
SODIUM SERPL-SCNC: 139 MMOL/L (ref 136–145)
TRIGL SERPL-MCNC: 196 MG/DL
VLDLC SERPL CALC-MCNC: 39.2 MG/DL
WBC # BLD AUTO: 5.8 K/UL (ref 3.6–11)

## 2025-05-24 PROCEDURE — 6370000000 HC RX 637 (ALT 250 FOR IP): Performed by: INTERNAL MEDICINE

## 2025-05-24 PROCEDURE — 84100 ASSAY OF PHOSPHORUS: CPT

## 2025-05-24 PROCEDURE — 94761 N-INVAS EAR/PLS OXIMETRY MLT: CPT

## 2025-05-24 PROCEDURE — 85025 COMPLETE CBC W/AUTO DIFF WBC: CPT

## 2025-05-24 PROCEDURE — 2500000003 HC RX 250 WO HCPCS: Performed by: INTERNAL MEDICINE

## 2025-05-24 PROCEDURE — 2500000003 HC RX 250 WO HCPCS

## 2025-05-24 PROCEDURE — 80061 LIPID PANEL: CPT

## 2025-05-24 PROCEDURE — 93005 ELECTROCARDIOGRAM TRACING: CPT

## 2025-05-24 PROCEDURE — 80048 BASIC METABOLIC PNL TOTAL CA: CPT

## 2025-05-24 PROCEDURE — 96372 THER/PROPH/DIAG INJ SC/IM: CPT

## 2025-05-24 PROCEDURE — G0378 HOSPITAL OBSERVATION PER HR: HCPCS

## 2025-05-24 RX ORDER — LOSARTAN POTASSIUM 50 MG/1
50 TABLET ORAL DAILY
Status: DISCONTINUED | OUTPATIENT
Start: 2025-05-24 | End: 2025-05-24 | Stop reason: HOSPADM

## 2025-05-24 RX ORDER — METOPROLOL SUCCINATE 50 MG/1
50 TABLET, EXTENDED RELEASE ORAL DAILY
Qty: 30 TABLET | Refills: 0 | Status: SHIPPED
Start: 2025-05-24

## 2025-05-24 RX ADMIN — SODIUM CHLORIDE, PRESERVATIVE FREE 10 ML: 5 INJECTION INTRAVENOUS at 08:03

## 2025-05-24 RX ADMIN — SODIUM CHLORIDE, PRESERVATIVE FREE 10 ML: 5 INJECTION INTRAVENOUS at 08:04

## 2025-05-24 RX ADMIN — LEVOTHYROXINE SODIUM 50 MCG: 0.05 TABLET ORAL at 08:03

## 2025-05-24 RX ADMIN — CEPHALEXIN 250 MG: 250 CAPSULE ORAL at 08:03

## 2025-05-24 RX ADMIN — ASPIRIN 81 MG: 81 TABLET, COATED ORAL at 08:03

## 2025-05-24 NOTE — DISCHARGE SUMMARY
Hospitalist Discharge Summary     Patient ID:    Tiera Iraheta  [id number]  89 y.o.  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]  Symptomatic bradycardia [R00.1]  Pacemaker malfunction, initial encounter [T82.111A]    Chronic Diagnoses:      Discharge Medications:   Current Discharge Medication List        CONTINUE these medications which have CHANGED    Details   metoprolol succinate (TOPROL XL) 50 MG extended release tablet Take 1 tablet by mouth daily  Qty: 30 tablet, Refills: 0           CONTINUE these medications which have NOT CHANGED    Details   albuterol sulfate HFA (VENTOLIN HFA) 108 (90 Base) MCG/ACT inhaler Inhale 2 puffs into the lungs every 4 hours as needed for Wheezing or Shortness of Breath (cough)  Qty: 18 g, Refills: 1      furosemide (LASIX) 40 MG tablet Take 1 tablet by mouth in the morning and 1 tablet in the evening.  Qty: 60 tablet, Refills: 3      spironolactone (ALDACTONE) 25 MG tablet Take 0.5 tablets by mouth daily  Qty: 30 tablet, Refills: 3      losartan (COZAAR) 50 MG tablet Take 1 tablet by mouth daily  Qty: 90 tablet, Refills: 3    Comments: Do NOT fill Entresto due to ACE-I allergy--- fill losartan instead      aspirin 81 MG EC tablet Take 1 tablet by mouth daily  Qty: 30 tablet, Refills: 3      NONFORMULARY 1 capsule daily Prebiotic and probiotic      levothyroxine (SYNTHROID) 50 MCG tablet Take by mouth every morning (before breakfast)      metFORMIN (GLUCOPHAGE) 500 MG tablet Take 1 tablet by mouth daily (with breakfast)           STOP taking these medications       atorvastatin (LIPITOR) 40 MG tablet Comments:   Reason for Stopping:         vitamin D 25 MCG (1000 UT) CAPS Comments:   Reason for Stopping:               Follow up Care:    1. No follow-up provider specified. in 1-2 weeks  2.   Patient's daughter called back. [facility] told her it was PCP responsibility to fill out Medical Necessity Form. Warm transfer to [place] to further discuss.

## 2025-05-24 NOTE — CARE COORDINATION
Care Management Initial Assessment  5/24/2025 10:59 AM  If patient is discharged prior to next notation, then this note serves as note for discharge by case management.    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]  Symptomatic bradycardia [R00.1]  Pacemaker malfunction, initial encounter [T82.111A]  Bradycardia [R00.1]  Procedure(s) (LRB):  Ultrasound guided vascular access (N/A)  Insert or replace transcath PPM leadless (N/A)  1 Day Post-Op    Patient Admission Status: Observation  Date Admitted to INP: 5/23/2025  RUR: Readmission Risk Score: 16.7    Hospitalization in the last 30 days (Readmission):  Yes        Advance Care Planning:  Code Status: Full Code  Primary Healthcare Decision Maker: (P) Legal Next of Kin (Roque Salazar (dtr) [phone])   Advance Directive: has an advanced directive - a copy HAS NOT been provided.     __________________________________________________________________________  Assessment:     Pt was re-admitted on 5/23/2025 for bradycardia. CM met with Pt to complete initial assessment. CM introduced self, CM role, and verified demographics.        05/24/25 1057   Service Assessment   Patient Orientation Alert and Oriented   Cognition Alert   History Provided By Patient   Primary Caregiver Self   Support Systems Children;Family Members  (Roque Salazar (dtr) [phone])   Patient's Healthcare Decision Maker is: Legal Next of Kin  (Roque Salazar (dtr) [phone])   PCP Verified by CM Yes  (Cliff Orourke MD [phone])   Prior Functional Level Independent in ADLs/IADLs   Current Functional Level Independent in ADLs/IADLs   Can patient return to prior living arrangement Yes   Ability to make needs known: Good   Family able to assist with home care needs: Yes   Would you like for me to discuss the discharge plan with any other family members/significant others, and if so,

## 2025-05-24 NOTE — PROGRESS NOTES
0700 Bedside and Verbal shift change report given to JOLLY Thompson (oncoming nurse) by JOLLY Chow (offgoing nurse). Report included the following information Nurse Handoff Report, Recent Results, Med Rec Status, and Cardiac Rhythm NSR .     1135   Discharge paperwork explained to patient, questions gone over & answered by this writer. IV removed. Patient agreeable to discharge.

## 2025-05-24 NOTE — PLAN OF CARE
Problem: Safety - Adult  Goal: Free from fall injury  5/24/2025 1136 by Donna Johnson RN  Outcome: Adequate for Discharge  5/24/2025 0733 by Donna Johnson RN  Outcome: Progressing  5/24/2025 0604 by Claudine Beasley RN  Outcome: Progressing     Problem: Chronic Conditions and Co-morbidities  Goal: Patient's chronic conditions and co-morbidity symptoms are monitored and maintained or improved  5/24/2025 1136 by Donna Johnson RN  Outcome: Adequate for Discharge  5/24/2025 0733 by Donna Johnson RN  Outcome: Progressing  5/24/2025 0604 by Claudine Beasley RN  Outcome: Progressing  Flowsheets (Taken 5/23/2025 2105)  Care Plan - Patient's Chronic Conditions and Co-Morbidity Symptoms are Monitored and Maintained or Improved: Monitor and assess patient's chronic conditions and comorbid symptoms for stability, deterioration, or improvement     Problem: Discharge Planning  Goal: Discharge to home or other facility with appropriate resources  5/24/2025 1136 by Donna Johnson RN  Outcome: Adequate for Discharge  5/24/2025 0733 by Donna Johnson RN  Outcome: Progressing  5/24/2025 0604 by Claudine Beasley RN  Outcome: Progressing  Flowsheets (Taken 5/23/2025 2105)  Discharge to home or other facility with appropriate resources:   Identify discharge learning needs (meds, wound care, etc)   Identify barriers to discharge with patient and caregiver     Problem: ABCDS Injury Assessment  Goal: Absence of physical injury  5/24/2025 1136 by Donna Johnson RN  Outcome: Adequate for Discharge  5/24/2025 0733 by Donna Johnson RN  Outcome: Progressing  5/24/2025 0604 by Claudine Beasley RN  Outcome: Progressing      monitoring., Disp: 1 each, Rfl: 3    Continuous Blood Gluc Sensor (DEXCOM G6 SENSOR) MISC, Change every 10 days-Use as directed for continuous glucose monitoring., Disp: 1 each, Rfl: 11    insulin glargine (LANTUS SOLOSTAR) 100 UNIT/ML injection pen, Inject 21 Units into the skin 2 times daily (Patient taking differently: Inject 20 Units into the skin 2 times daily ), Disp: 15 pen, Rfl: 3    cyclobenzaprine (FLEXERIL) 5 MG tablet, Take 5 mg by mouth as needed, Disp: , Rfl:     pravastatin (PRAVACHOL) 80 MG tablet, Take 1 tablet by mouth daily, Disp: 90 tablet, Rfl: 1    Misc. Devices (ROLLATOR ULTRA-LIGHT) MISC, Use Rollator for stability with ambulation. , Disp: 1 each, Rfl: 0    omeprazole (PRILOSEC) 40 MG delayed release capsule, Take 1 capsule by mouth once daily, Disp: 90 capsule, Rfl: 2    Blood Glucose Monitoring Suppl (ONE TOUCH ULTRA 2) w/Device KIT, 1 kit by Does not apply route daily, Disp: 1 kit, Rfl: 0    blood glucose monitor strips, One Touch Ultra Test Strips. Test 2 times a day and as needed for diabetic symptoms. DX: E11.22, Disp: 300 strip, Rfl: 5    acyclovir (ZOVIRAX) 800 MG tablet, TAKE 1 TABLET BY MOUTH AS NEEDED (HERPES LABIALIS), Disp: , Rfl:     sertraline (ZOLOFT) 25 MG tablet, Take 100 mg by mouth daily , Disp: , Rfl:     nitroGLYCERIN (NITROSTAT) 0.4 MG SL tablet, Place 1 tablet under the tongue every 5 minutes as needed for Chest pain, Disp: 25 tablet, Rfl: 3    aspirin 81 MG tablet, Take 81 mg by mouth daily, Disp: , Rfl:     insulin lispro, 1 Unit Dial, (HUMALOG KWIKPEN) 100 UNIT/ML SOPN, Inject 7 Units into the skin 3 times daily (before meals), Disp: 10 pen, Rfl: 3    metFORMIN (GLUCOPHAGE) 500 MG tablet, Take 1,000 mg by mouth 2 times daily, Disp: , Rfl:     CPAP Machine MISC, by Does not apply route Please change BIPAP pressure to 12/9 cm H20., Disp: 1 each, Rfl: 0    The patient is allergic to latex.     Past Medical History  Sue Buckner  has a past medical history of Anxiety, CAD (coronary artery disease), Depression, Diabetes mellitus ([address] Frequent headaches, Gastroparesis, GERD (gastroesophageal reflux disease), History of Chiari malformation, Hyperlipidemia, Hypertension, Kidney stone, Kidney stone, Migraine, Movement disorder, Pulmonary emboli ([facility] [address] Sleep apnea, and Tattoos. Past Surgical History  The patient  has a past surgical history that includes Tonsillectomy (1964); back surgery (2008); Colonoscopy (2017); Endoscopy, colon, diagnostic (2007); vascular surgery (2015); brain surgery (2008); Cholecystectomy (2009); fracture surgery (1964); Cardiac surgery (2007); shoulder surgery (Left, 08/16/2018); Nasal septum surgery (2006); Colonoscopy (N/A, 3/12/2020); Upper gastrointestinal endoscopy (Left, 3/12/2020); Upper gastrointestinal endoscopy (Left, 3/12/2020); and back surgery (08/25/2020). Family History  This patient's family history includes Cancer in his father; Diabetes in his brother, father, mother, and sister; Heart Disease in his mother; High Blood Pressure in his brother, father, mother, and sister; Kidney Disease in his mother. Social History  Denice Noel  reports that he quit smoking about 41 years ago. His smoking use included cigarettes. He started smoking about 44 years ago. He has a 4.50 pack-year smoking history. He has never used smokeless tobacco. He reports that he does not drink alcohol or use drugs.     Health Maintenance:    Health Maintenance   Topic Date Due    Diabetic retinal exam  Never done    COVID-19 Vaccine (1) Never done   [employer] Visit (AWV)  Never done    Shingles Vaccine (2 of 3) 11/17/2021 (Originally 1/23/2017)    A1C test (Diabetic or Prediabetic)  05/23/2021    Diabetic foot exam  07/14/2021    Diabetic microalbuminuria test  07/22/2021    Lipid screen  07/22/2021    Potassium monitoring  03/11/2022    Creatinine monitoring  03/11/2022    DTaP/Tdap/Td vaccine (2 - Td) 07/22/2025    Colon cancer screen colonoscopy  03/12/2030    Flu vaccine  Completed    Pneumococcal 0-64 years Vaccine  Completed    Hepatitis A vaccine  Aged Out    Hib vaccine  Aged Out    Meningococcal (ACWY) vaccine  Aged Out    Hepatitis C screen  Discontinued    HIV screen  Discontinued       Subjective:      Review of Systems   Constitutional: Negative for chills, fatigue and fever. HENT: Negative for sore throat and trouble swallowing. Eyes: Negative for visual disturbance. Respiratory: Negative for cough and shortness of breath. Cardiovascular: Negative for chest pain and leg swelling. Gastrointestinal: Negative for abdominal pain, constipation, diarrhea, nausea and vomiting. Genitourinary: Negative for difficulty urinating. Musculoskeletal: Negative for arthralgias and myalgias. Skin: Negative for rash and wound. Neurological: Negative for numbness. Objective:     /72 (Site: Left Upper Arm, Position: Sitting, Cuff Size: Large Adult)   Pulse 76   Temp 98.1 °F (36.7 °C) (Temporal)   Ht 5' 11\" (1.803 m)   Wt 210 lb (95.3 kg)   SpO2 98%   BMI 29.29 kg/m²     Physical Exam  Vitals signs reviewed. Constitutional:       General: He is not in acute distress. Appearance: He is well-developed. He is not diaphoretic. HENT:      Head: Normocephalic and atraumatic. Mouth/Throat:      Pharynx: No oropharyngeal exudate. Neck:      Musculoskeletal: Normal range of motion and neck supple. Thyroid: No thyromegaly. Cardiovascular:      Rate and Rhythm: Normal rate and regular rhythm. Heart sounds: Normal heart sounds. No murmur. No friction rub. No gallop. Pulmonary:      Effort: Pulmonary effort is normal. No respiratory distress. Breath sounds: Normal breath sounds. No wheezing or rales. Abdominal:      General: There is no distension. Palpations: Abdomen is soft. Tenderness: There is no abdominal tenderness. Musculoskeletal: Normal range of motion. General: No tenderness. Lymphadenopathy:      Cervical: No cervical adenopathy. Skin:     General: Skin is warm and dry. Coloration: Skin is not pale. Findings: No erythema or rash. Neurological:      Mental Status: He is alert and oriented to person, place, and time. Labs Reviewed 2/23/2021:    Lab Results   Component Value Date    WBC 8.3 03/11/2021    HGB 13.2 (L) 03/11/2021    HCT 40.4 (L) 03/11/2021     03/11/2021    CHOL 166 05/21/2016    TRIG 215 (H) 05/21/2016    HDL 44 07/22/2020    ALT 27 07/22/2020    AST 31 07/22/2020     03/11/2021    K 4.6 03/11/2021     03/11/2021    CREATININE 1.1 03/11/2021    BUN 16 03/11/2021    CO2 27 03/11/2021    TSH 1.020 10/29/2018    INR 1.34 (H) 03/11/2021    GLUF 146 (H) 11/14/2020    LABA1C 7.1 (H) 02/23/2021    LABMICR 250.22 07/22/2020       Assessment/Plan      1. Type 2 diabetes mellitus with hyperglycemia, with long-term current use of insulin (HCC)  A1C 7.1%  Glucose logs reviewed and discusse. Increase Metformin to 1000 mg am, 500 mg pm.   Continue current insulin. Glucose logs and labs in about 4 weeks, and may go up to 1000 mg twice daily then. Schedule dilated eye exam  Foot exam next visit  - POCT glycosylated hemoglobin (Hb A1C)  - Microalbumin / Creatinine Urine Ratio; Future  - LDL Cholesterol, Direct; Future  - Basic Metabolic Panel; Future    2. Microalbuminuria due to type 2 diabetes mellitus (HCC)  On Lisinopril 10 mg daily  - Microalbumin / Creatinine Urine Ratio; Future    3. Mixed hyperlipidemia  On statin therapy  - LDL Cholesterol, Direct; Future      Return in about 3 months (around 5/23/2021) for Diabetes. Patient given educational materials - see patient instructions. Discussed use, benefit, and side effects of prescribed medications. All patient questions answered. Pt voiced understanding.      Electronically signed RAFAEL Guerrero CNP on 2/23/21 at 10:36 AM EST

## 2025-05-24 NOTE — PROGRESS NOTES
Echo attempted. Patient just received breakfast. \"I want to eat my breakfast...because I am hungry!\" Will follow.

## 2025-05-24 NOTE — PLAN OF CARE
Problem: Safety - Adult  Goal: Free from fall injury  5/24/2025 0733 by Donna Johnson RN  Outcome: Progressing  5/24/2025 0604 by Claudine Beasley RN  Outcome: Progressing     Problem: Chronic Conditions and Co-morbidities  Goal: Patient's chronic conditions and co-morbidity symptoms are monitored and maintained or improved  5/24/2025 0733 by Donna Johnson RN  Outcome: Progressing  5/24/2025 0604 by Claudine Beasley RN  Outcome: Progressing  Flowsheets (Taken 5/23/2025 2105)  Care Plan - Patient's Chronic Conditions and Co-Morbidity Symptoms are Monitored and Maintained or Improved: Monitor and assess patient's chronic conditions and comorbid symptoms for stability, deterioration, or improvement     Problem: Discharge Planning  Goal: Discharge to home or other facility with appropriate resources  5/24/2025 0733 by Donna Johnson RN  Outcome: Progressing  5/24/2025 0604 by Claudine Beasley RN  Outcome: Progressing  Flowsheets (Taken 5/23/2025 2105)  Discharge to home or other facility with appropriate resources:   Identify discharge learning needs (meds, wound care, etc)   Identify barriers to discharge with patient and caregiver     Problem: ABCDS Injury Assessment  Goal: Absence of physical injury  5/24/2025 0733 by Donna Johnson RN  Outcome: Progressing  5/24/2025 0604 by Claudine Beasley RN  Outcome: Progressing

## 2025-05-24 NOTE — PLAN OF CARE
Problem: Safety - Adult  Goal: Free from fall injury  Outcome: Progressing     Problem: Chronic Conditions and Co-morbidities  Goal: Patient's chronic conditions and co-morbidity symptoms are monitored and maintained or improved  Outcome: Progressing  Flowsheets (Taken 5/23/2025 2105)  Care Plan - Patient's Chronic Conditions and Co-Morbidity Symptoms are Monitored and Maintained or Improved: Monitor and assess patient's chronic conditions and comorbid symptoms for stability, deterioration, or improvement     Problem: Discharge Planning  Goal: Discharge to home or other facility with appropriate resources  Outcome: Progressing  Flowsheets (Taken 5/23/2025 2105)  Discharge to home or other facility with appropriate resources:   Identify discharge learning needs (meds, wound care, etc)   Identify barriers to discharge with patient and caregiver     Problem: ABCDS Injury Assessment  Goal: Absence of physical injury  Outcome: Progressing

## 2025-05-26 LAB
ECHO BSA: 1.67 M2
ECHO BSA: 1.67 M2

## 2025-05-30 ENCOUNTER — TELEPHONE (OUTPATIENT)
Age: 89
End: 2025-05-30

## 2025-05-30 NOTE — TELEPHONE ENCOUNTER
Implanted Micra AV2 on 5-23-25. Will ask patient to send a manual transmission before upcoming appointment.    Placed a call to patient. ID verified x 2. Patient was advised of the above. Per patient she is not able to send a transmission due to not having power at her residence. Advised patient we will discuss more in detail at her appointment on Monday.     Future Appointments   Date Time Provider Department Center   6/2/2025 11:00 AM PACEMAKER, STFRAUDREY [facility] AMB   6/20/2025 10:00 AM Sekou Dyson MD [facility] AMB   6/20/2025 11:00 AM [facility] ECHO 1 [facility] AMB   9/10/2025  9:00 AM PACEMAKER, STFRAUDRE[facility] AMB   9/10/2025  9:20 AM Navdeep Villareal MD [facility] AMB

## 2025-06-02 ENCOUNTER — PROCEDURE VISIT (OUTPATIENT)
Age: 89
End: 2025-06-02

## 2025-06-02 DIAGNOSIS — Z95.0 PRESENCE OF CARDIAC PACEMAKER: Primary | ICD-10-CM

## 2025-06-14 PROCEDURE — 93294 REM INTERROG EVL PM/LDLS PM: CPT | Performed by: INTERNAL MEDICINE

## 2025-07-01 ENCOUNTER — APPOINTMENT (OUTPATIENT)
Facility: HOSPITAL | Age: 89
DRG: 280 | End: 2025-07-01
Payer: MEDICARE

## 2025-07-01 ENCOUNTER — HOSPITAL ENCOUNTER (INPATIENT)
Facility: HOSPITAL | Age: 89
LOS: 2 days | Discharge: HOME | DRG: 280 | End: 2025-07-03
Attending: EMERGENCY MEDICINE | Admitting: INTERNAL MEDICINE
Payer: MEDICARE

## 2025-07-01 DIAGNOSIS — R06.02 SHORTNESS OF BREATH: ICD-10-CM

## 2025-07-01 DIAGNOSIS — I50.9 ACUTE CONGESTIVE HEART FAILURE, UNSPECIFIED HEART FAILURE TYPE (HCC): ICD-10-CM

## 2025-07-01 DIAGNOSIS — I24.9 ACS (ACUTE CORONARY SYNDROME) (HCC): Primary | ICD-10-CM

## 2025-07-01 PROBLEM — I50.23 ACUTE ON CHRONIC HFREF (HEART FAILURE WITH REDUCED EJECTION FRACTION) (HCC): Status: ACTIVE | Noted: 2025-07-01

## 2025-07-01 PROBLEM — R07.9 CHEST PAIN: Status: ACTIVE | Noted: 2025-07-01

## 2025-07-01 PROBLEM — Z86.79 HISTORY OF COMPLETE AV BLOCK: Status: ACTIVE | Noted: 2025-07-01

## 2025-07-01 PROBLEM — E03.9 HYPOTHYROIDISM: Status: ACTIVE | Noted: 2025-07-01

## 2025-07-01 LAB
APTT PPP: 26.7 SEC (ref 22.1–31)
B PERT DNA SPEC QL NAA+PROBE: NOT DETECTED
BASOPHILS NFR BLD: 1.1 % (ref 0–1)
BORDETELLA PARAPERTUSSIS BY PCR: NOT DETECTED
BUN SERPL-MCNC: 26 MG/DL (ref 6–20)
BUN/CREAT SERPL: 18 (ref 12–20)
C PNEUM DNA SPEC QL NAA+PROBE: NOT DETECTED
CHLORIDE SERPL-SCNC: 108 MMOL/L (ref 97–108)
CO2 SERPL-SCNC: 21 MMOL/L (ref 21–32)
CREAT SERPL-MCNC: 1.42 MG/DL (ref 0.55–1.02)
DIFFERENTIAL METHOD BLD: ABNORMAL
EOSINOPHIL NFR BLD: 1.3 % (ref 0–7)
ERYTHROCYTE [DISTWIDTH] IN BLOOD BY AUTOMATED COUNT: 14.7 % (ref 11.5–14.5)
FLUAV SUBTYP SPEC NAA+PROBE: NOT DETECTED
FLUBV RNA SPEC QL NAA+PROBE: NOT DETECTED
GLUCOSE BLD STRIP.AUTO-MCNC: 165 MG/DL (ref 65–117)
GLUCOSE BLD STRIP.AUTO-MCNC: 207 MG/DL (ref 65–117)
GLUCOSE SERPL-MCNC: 154 MG/DL (ref 65–100)
HADV DNA SPEC QL NAA+PROBE: NOT DETECTED
HCOV 229E RNA SPEC QL NAA+PROBE: NOT DETECTED
HCOV HKU1 RNA SPEC QL NAA+PROBE: NOT DETECTED
HCOV NL63 RNA SPEC QL NAA+PROBE: NOT DETECTED
HCOV OC43 RNA SPEC QL NAA+PROBE: NOT DETECTED
HCT VFR BLD AUTO: 37.2 % (ref 35–47)
HGB BLD-MCNC: 13 G/DL (ref 11.5–16)
HMPV RNA SPEC QL NAA+PROBE: NOT DETECTED
HPIV1 RNA SPEC QL NAA+PROBE: NOT DETECTED
HPIV2 RNA SPEC QL NAA+PROBE: NOT DETECTED
HPIV3 RNA SPEC QL NAA+PROBE: NOT DETECTED
HPIV4 RNA SPEC QL NAA+PROBE: NOT DETECTED
IMM GRANULOCYTES # BLD AUTO: 0.04 K/UL (ref 0–0.04)
IMM GRANULOCYTES NFR BLD AUTO: 0.6 % (ref 0–0.5)
INR PPP: 1.1 (ref 0.9–1.1)
LYMPHOCYTES # BLD: 1.44 K/UL (ref 0.8–3.5)
M PNEUMO DNA SPEC QL NAA+PROBE: NOT DETECTED
MAGNESIUM SERPL-MCNC: 2.3 MG/DL (ref 1.6–2.4)
MCH RBC QN AUTO: 26.5 PG (ref 26–34)
MCHC RBC AUTO-ENTMCNC: 34.9 G/DL (ref 30–36.5)
MCV RBC AUTO: 75.9 FL (ref 80–99)
MONOCYTES # BLD: 0.54 K/UL (ref 0–1)
MONOCYTES NFR BLD: 8.8 % (ref 5–13)
NEUTS SEG # BLD: 4 K/UL (ref 1.8–8)
NEUTS SEG NFR BLD: 64.9 % (ref 32–75)
NRBC BLD-RTO: 0 PER 100 WBC
NT PRO BNP: 6608 PG/ML
PLATELET # BLD AUTO: 261 K/UL (ref 150–400)
PMV BLD AUTO: 12.2 FL (ref 8.9–12.9)
POTASSIUM SERPL-SCNC: 4.5 MMOL/L (ref 3.5–5.1)
PROTHROMBIN TIME: 11.4 SEC (ref 9.2–11.2)
RBC # BLD AUTO: 4.9 M/UL (ref 3.8–5.2)
RSV RNA SPEC QL NAA+PROBE: NOT DETECTED
RV+EV RNA SPEC QL NAA+PROBE: NOT DETECTED
SARS-COV-2 RNA RESP QL NAA+PROBE: NOT DETECTED
SERVICE CMNT-IMP: ABNORMAL
SERVICE CMNT-IMP: ABNORMAL
SODIUM SERPL-SCNC: 139 MMOL/L (ref 136–145)
THERAPEUTIC RANGE: NORMAL SECS (ref 58–77)
TROPONIN I SERPL HS-MCNC: 123 NG/L (ref 0–51)
UFH PPP CHRO-ACNC: 1.4 IU/ML

## 2025-07-01 PROCEDURE — 96374 THER/PROPH/DIAG INJ IV PUSH: CPT

## 2025-07-01 PROCEDURE — 6370000000 HC RX 637 (ALT 250 FOR IP): Performed by: INTERNAL MEDICINE

## 2025-07-01 PROCEDURE — 6360000002 HC RX W HCPCS: Performed by: EMERGENCY MEDICINE

## 2025-07-01 PROCEDURE — 80048 BASIC METABOLIC PNL TOTAL CA: CPT

## 2025-07-01 PROCEDURE — APPSS45 APP SPLIT SHARED TIME 31-45 MINUTES: Performed by: NURSE PRACTITIONER

## 2025-07-01 PROCEDURE — 71045 X-RAY EXAM CHEST 1 VIEW: CPT

## 2025-07-01 PROCEDURE — 83880 ASSAY OF NATRIURETIC PEPTIDE: CPT

## 2025-07-01 PROCEDURE — 85610 PROTHROMBIN TIME: CPT

## 2025-07-01 PROCEDURE — 99222 1ST HOSP IP/OBS MODERATE 55: CPT | Performed by: SPECIALIST

## 2025-07-01 PROCEDURE — 6360000004 HC RX CONTRAST MEDICATION: Performed by: EMERGENCY MEDICINE

## 2025-07-01 PROCEDURE — 94761 N-INVAS EAR/PLS OXIMETRY MLT: CPT

## 2025-07-01 PROCEDURE — 99285 EMERGENCY DEPT VISIT HI MDM: CPT

## 2025-07-01 PROCEDURE — 85520 HEPARIN ASSAY: CPT

## 2025-07-01 PROCEDURE — 0202U NFCT DS 22 TRGT SARS-COV-2: CPT

## 2025-07-01 PROCEDURE — 84484 ASSAY OF TROPONIN QUANT: CPT

## 2025-07-01 PROCEDURE — 2060000000 HC ICU INTERMEDIATE R&B

## 2025-07-01 PROCEDURE — 85730 THROMBOPLASTIN TIME PARTIAL: CPT

## 2025-07-01 PROCEDURE — 82962 GLUCOSE BLOOD TEST: CPT

## 2025-07-01 PROCEDURE — 6370000000 HC RX 637 (ALT 250 FOR IP): Performed by: SPECIALIST

## 2025-07-01 PROCEDURE — 36415 COLL VENOUS BLD VENIPUNCTURE: CPT

## 2025-07-01 PROCEDURE — 83735 ASSAY OF MAGNESIUM: CPT

## 2025-07-01 PROCEDURE — 93005 ELECTROCARDIOGRAM TRACING: CPT | Performed by: EMERGENCY MEDICINE

## 2025-07-01 PROCEDURE — 2500000003 HC RX 250 WO HCPCS: Performed by: INTERNAL MEDICINE

## 2025-07-01 PROCEDURE — 85025 COMPLETE CBC W/AUTO DIFF WBC: CPT

## 2025-07-01 PROCEDURE — 71275 CT ANGIOGRAPHY CHEST: CPT

## 2025-07-01 RX ORDER — HEPARIN SODIUM 1000 [USP'U]/ML
4000 INJECTION, SOLUTION INTRAVENOUS; SUBCUTANEOUS PRN
Status: DISCONTINUED | OUTPATIENT
Start: 2025-07-01 | End: 2025-07-02

## 2025-07-01 RX ORDER — ACETAMINOPHEN 325 MG/1
650 TABLET ORAL EVERY 6 HOURS PRN
Status: DISCONTINUED | OUTPATIENT
Start: 2025-07-01 | End: 2025-07-03 | Stop reason: HOSPADM

## 2025-07-01 RX ORDER — MORPHINE SULFATE 4 MG/ML
2 INJECTION, SOLUTION INTRAMUSCULAR; INTRAVENOUS EVERY 4 HOURS PRN
Refills: 0 | Status: DISCONTINUED | OUTPATIENT
Start: 2025-07-01 | End: 2025-07-03 | Stop reason: HOSPADM

## 2025-07-01 RX ORDER — INSULIN LISPRO 100 [IU]/ML
0-4 INJECTION, SOLUTION INTRAVENOUS; SUBCUTANEOUS
Status: DISCONTINUED | OUTPATIENT
Start: 2025-07-01 | End: 2025-07-03 | Stop reason: HOSPADM

## 2025-07-01 RX ORDER — HEPARIN SODIUM 1000 [USP'U]/ML
2000 INJECTION, SOLUTION INTRAVENOUS; SUBCUTANEOUS PRN
Status: DISCONTINUED | OUTPATIENT
Start: 2025-07-01 | End: 2025-07-02

## 2025-07-01 RX ORDER — ACETAMINOPHEN 650 MG/1
650 SUPPOSITORY RECTAL EVERY 6 HOURS PRN
Status: DISCONTINUED | OUTPATIENT
Start: 2025-07-01 | End: 2025-07-03 | Stop reason: HOSPADM

## 2025-07-01 RX ORDER — HEPARIN SODIUM 10000 [USP'U]/100ML
5-30 INJECTION, SOLUTION INTRAVENOUS CONTINUOUS
Status: DISCONTINUED | OUTPATIENT
Start: 2025-07-01 | End: 2025-07-02

## 2025-07-01 RX ORDER — LOSARTAN POTASSIUM 50 MG/1
50 TABLET ORAL DAILY
Status: DISCONTINUED | OUTPATIENT
Start: 2025-07-02 | End: 2025-07-03 | Stop reason: HOSPADM

## 2025-07-01 RX ORDER — ONDANSETRON 2 MG/ML
4 INJECTION INTRAMUSCULAR; INTRAVENOUS EVERY 6 HOURS PRN
Status: DISCONTINUED | OUTPATIENT
Start: 2025-07-01 | End: 2025-07-03 | Stop reason: HOSPADM

## 2025-07-01 RX ORDER — IOPAMIDOL 755 MG/ML
100 INJECTION, SOLUTION INTRAVASCULAR
Status: COMPLETED | OUTPATIENT
Start: 2025-07-01 | End: 2025-07-01

## 2025-07-01 RX ORDER — METOPROLOL SUCCINATE 50 MG/1
50 TABLET, EXTENDED RELEASE ORAL DAILY
Status: DISCONTINUED | OUTPATIENT
Start: 2025-07-01 | End: 2025-07-03 | Stop reason: HOSPADM

## 2025-07-01 RX ORDER — FUROSEMIDE 10 MG/ML
40 INJECTION INTRAMUSCULAR; INTRAVENOUS 2 TIMES DAILY
Status: DISCONTINUED | OUTPATIENT
Start: 2025-07-01 | End: 2025-07-01

## 2025-07-01 RX ORDER — SODIUM CHLORIDE 9 MG/ML
INJECTION, SOLUTION INTRAVENOUS PRN
Status: DISCONTINUED | OUTPATIENT
Start: 2025-07-01 | End: 2025-07-03 | Stop reason: HOSPADM

## 2025-07-01 RX ORDER — FUROSEMIDE 10 MG/ML
80 INJECTION INTRAMUSCULAR; INTRAVENOUS
Status: COMPLETED | OUTPATIENT
Start: 2025-07-01 | End: 2025-07-01

## 2025-07-01 RX ORDER — BUMETANIDE 0.25 MG/ML
1 INJECTION, SOLUTION INTRAMUSCULAR; INTRAVENOUS EVERY 12 HOURS
Status: DISCONTINUED | OUTPATIENT
Start: 2025-07-02 | End: 2025-07-02

## 2025-07-01 RX ORDER — SODIUM CHLORIDE 0.9 % (FLUSH) 0.9 %
5-40 SYRINGE (ML) INJECTION EVERY 12 HOURS SCHEDULED
Status: DISCONTINUED | OUTPATIENT
Start: 2025-07-01 | End: 2025-07-03 | Stop reason: HOSPADM

## 2025-07-01 RX ORDER — POLYETHYLENE GLYCOL 3350 17 G/17G
17 POWDER, FOR SOLUTION ORAL DAILY PRN
Status: DISCONTINUED | OUTPATIENT
Start: 2025-07-01 | End: 2025-07-03 | Stop reason: HOSPADM

## 2025-07-01 RX ORDER — HEPARIN SODIUM 1000 [USP'U]/ML
4000 INJECTION, SOLUTION INTRAVENOUS; SUBCUTANEOUS ONCE
Status: COMPLETED | OUTPATIENT
Start: 2025-07-01 | End: 2025-07-01

## 2025-07-01 RX ORDER — ONDANSETRON 4 MG/1
4 TABLET, ORALLY DISINTEGRATING ORAL EVERY 8 HOURS PRN
Status: DISCONTINUED | OUTPATIENT
Start: 2025-07-01 | End: 2025-07-03 | Stop reason: HOSPADM

## 2025-07-01 RX ORDER — ASPIRIN 325 MG
325 TABLET ORAL
Status: DISCONTINUED | OUTPATIENT
Start: 2025-07-01 | End: 2025-07-02

## 2025-07-01 RX ORDER — FUROSEMIDE 40 MG/1
40 TABLET ORAL 2 TIMES DAILY
Status: DISCONTINUED | OUTPATIENT
Start: 2025-07-01 | End: 2025-07-01

## 2025-07-01 RX ORDER — SODIUM CHLORIDE 0.9 % (FLUSH) 0.9 %
5-40 SYRINGE (ML) INJECTION PRN
Status: DISCONTINUED | OUTPATIENT
Start: 2025-07-01 | End: 2025-07-03 | Stop reason: HOSPADM

## 2025-07-01 RX ADMIN — FUROSEMIDE 80 MG: 10 INJECTION, SOLUTION INTRAMUSCULAR; INTRAVENOUS at 12:56

## 2025-07-01 RX ADMIN — HEPARIN SODIUM 12 UNITS/KG/HR: 10000 INJECTION, SOLUTION INTRAVENOUS at 14:07

## 2025-07-01 RX ADMIN — HEPARIN SODIUM 4000 UNITS: 1000 INJECTION INTRAVENOUS; SUBCUTANEOUS at 14:03

## 2025-07-01 RX ADMIN — IOPAMIDOL 78 ML: 755 INJECTION, SOLUTION INTRAVENOUS at 12:29

## 2025-07-01 RX ADMIN — SODIUM CHLORIDE, PRESERVATIVE FREE 10 ML: 5 INJECTION INTRAVENOUS at 21:41

## 2025-07-01 RX ADMIN — METOPROLOL SUCCINATE 50 MG: 50 TABLET, EXTENDED RELEASE ORAL at 17:58

## 2025-07-01 RX ADMIN — INSULIN LISPRO 1 UNITS: 100 INJECTION, SOLUTION INTRAVENOUS; SUBCUTANEOUS at 21:41

## 2025-07-01 ASSESSMENT — ENCOUNTER SYMPTOMS
VOMITING: 0
ABDOMINAL PAIN: 0
ABDOMINAL DISTENTION: 0
NAUSEA: 0
BLOOD IN STOOL: 0
DIARRHEA: 0
SHORTNESS OF BREATH: 1
COUGH: 1
ANAL BLEEDING: 0

## 2025-07-01 ASSESSMENT — PAIN - FUNCTIONAL ASSESSMENT: PAIN_FUNCTIONAL_ASSESSMENT: 0-10

## 2025-07-01 ASSESSMENT — PAIN SCALES - GENERAL
PAINLEVEL_OUTOF10: 4
PAINLEVEL_OUTOF10: 0

## 2025-07-01 NOTE — CARE COORDINATION
7/1/2025  4:40 PM      Care Management Initial Assessment  7/1/2025 4:40 PM  If patient is discharged prior to next notation, then this note serves as note for discharge by case management.    Reason for Admission:   Acute on chronic HFrEF (heart failure with reduced ejection fraction) (formerly [facility]) [I50.23]         Patient Admission Status: Inpatient  Date Admitted to INP: 7/1/25  RUR: Readmission Risk Score: 15.9    Hospitalization in the last 30 days (Readmission):  No        Advance Care Planning:  Code Status: Full Code  Primary Healthcare Decision Maker: (P) Legal Next of Kin (juan manuel Salazar (MAC) [phone])   Advance Directive: has NO advanced directive - not interested in additional information     __________________________________________________________________________  Assessment:      07/01/25 1635   Service Assessment   Patient Orientation Alert and Oriented   Cognition Alert   History Provided By Patient   Primary Caregiver Self   Support Systems Children;Family Members   Patient's Healthcare Decision Maker is: Legal Next of Kin  (juan manuel Salazar (MAC) [phone])   PCP Verified by CM Yes  (Cliff Orourke MD)   Last Visit to PCP Within last 3 months  (6/30/25)   Prior Functional Level Independent in ADLs/IADLs;Assistance with the following:;Shopping;Cooking;Housework;Other (see comment)  (rollator for mobility)   Current Functional Level Independent in ADLs/IADLs;Assistance with the following:;Cooking;Housework;Shopping;Mobility  (rollator for mobility)   Can patient return to prior living arrangement Yes   Ability to make needs known: Good   Family able to assist with home care needs: Yes   Financial Resources Medicare;Other (Comment)  (MedicareA/B, [employer])   Community Resources None   Social/Functional History   Lives With (S)  Daughter;Family  (Lives w/ Dtr and TOYA)   Type of Home House   Home Layout Two level   Home Access Stairs to enter with rails   Entrance Stairs - Number of

## 2025-07-01 NOTE — ED PROVIDER NOTES
[facility] EMERGENCY DEPARTMENT  EMERGENCY DEPARTMENT ENCOUNTER      Pt Name: Tiera Iraheta  MRN: [id number]  Birthdate 7/8/1931  Date of evaluation: 7/1/2025  Provider: Charles Ferris MD    CHIEF COMPLAINT       Chief Complaint   Patient presents with    Chest Pain         HISTORY OF PRESENT ILLNESS   (Location/Symptom, Timing/Onset, Context/Setting, Quality, Duration, Modifying Factors, Severity)  Note limiting factors.   89F w/ hx DM, complete heart block s/p PPM, HTN, CHF, PAD p/w 1d chest pain and sob. Pt reports left sided nonradiating constant chest pain starting 2am w/ SOB at rest. No palpitations, dizziness or syncope. Also reports dry cough and increased BLE swelling. No F/C, N/V, rectal bleeding. Taking meds as prescribed. ECHO 5/2025 showed moderate AS and THQI61-58%            Review of External Medical Records:     Nursing Notes were reviewed.    REVIEW OF SYSTEMS    (2-9 systems for level 4, 10 or more for level 5)     Review of Systems   Constitutional:  Negative for diaphoresis and fever.   HENT:  Negative for nosebleeds.    Eyes:  Negative for visual disturbance.   Respiratory:  Positive for cough and shortness of breath.    Cardiovascular:  Positive for chest pain. Negative for palpitations and leg swelling.   Gastrointestinal:  Negative for abdominal distention, abdominal pain, anal bleeding, blood in stool, diarrhea, nausea and vomiting.   Endocrine: Negative for polyuria.   Genitourinary:  Negative for difficulty urinating, dysuria, frequency and hematuria.   Musculoskeletal:  Negative for joint swelling.   Skin:  Negative for wound.   Allergic/Immunologic: Negative for immunocompromised state.   Neurological:  Negative for seizures and syncope.   Hematological:  Does not bruise/bleed easily.   Psychiatric/Behavioral:  Negative for confusion.        Except as noted above the remainder of the review of systems was reviewed and negative.       PAST MEDICAL HISTORY     Past

## 2025-07-01 NOTE — ED NOTES
TRANSFER - OUT REPORT:    Verbal report given to Karla AZEVEDO on Tiera Iraheta  being transferred to [facility] for routine progression of patient care       Report consisted of patient's Situation, Background, Assessment and   Recommendations(SBAR).     Information from the following report(s) Nurse Handoff Report, Index, ED Encounter Summary, ED SBAR, Adult Overview, MAR, and Recent Results was reviewed with the receiving nurse.    [place] Fall Assessment:    Presents to emergency department  because of falls (Syncope, seizure, or loss of consciousness): No  Age > 70: Yes  Altered Mental Status, Intoxication with alcohol or substance confusion (Disorientation, impaired judgment, poor safety awaremess, or inability to follow instructions): No  Impaired Mobility: Ambulates or transfers with assistive devices or assistance; Unable to ambulate or transer.: No  Nursing Judgement: Yes          Lines:   Peripheral IV 07/01/25 Right Antecubital (Active)   Site Assessment Clean, dry & intact 07/01/25 1101   Line Status Blood return noted 07/01/25 1101   Line Care Cap changed 07/01/25 1101   Phlebitis Assessment No symptoms 07/01/25 1101   Infiltration Assessment 0 07/01/25 1101   Dressing Status Clean, dry & intact;New dressing applied 07/01/25 1101   Dressing Type Transparent 07/01/25 1101   Dressing Intervention New 07/01/25 1101       Peripheral IV 07/01/25 Posterior;Right Hand (Active)   Site Assessment Clean, dry & intact 07/01/25 1102   Line Status Blood return noted 07/01/25 1102   Line Care Cap changed 07/01/25 1102   Phlebitis Assessment No symptoms 07/01/25 1102   Infiltration Assessment 0 07/01/25 1102   Dressing Status New dressing applied;Clean, dry & intact 07/01/25 1102   Dressing Type Transparent 07/01/25 1102   Dressing Intervention New 07/01/25 1102        Opportunity for questions and clarification was provided.      Patient transported with:  Tech

## 2025-07-01 NOTE — H&P
[facility]  [address] [place], [place]  [id number] [phone]    Hospital Medicine History and Physical      NAME:       Tiera Iraheta   :       1931   MRN:      [id number]     Date of service:   2025     Chief  Complaint:  Chest pain and SOB     History Of Presenting Illness:       Ms. Iraheta is a 89 y.o. female who is being admitted for Acute on chronic HFrEF (heart failure with reduced ejection fraction) with chest pain. Ms. Iraheta presented to our Emergency Department today complaining of chest pain and SOB which started earlier today. Chest discomfort was left sided, non radiating associated with SOB at rest. She had a dry cough but no fever. In the ED, a CXR and CTA chest done were as noted below.  A Hs troponin was 123 and pro-BNP was 6600. She will be admitted for further management.     Allergies   Allergen Reactions    Jardiance [Empagliflozin]      Dizziness, extreme thirst    Simvastatin      Other reaction(s): Unknown (comments)  Dry lips and mouth    Lisinopril Angioedema     Face swelling       Prior to Admission medications    Medication Sig Start Date End Date Taking? Authorizing Provider   metoprolol succinate (TOPROL XL) 50 MG extended release tablet Take 1 tablet by mouth daily 25   Miguel Solorzano MD   albuterol sulfate HFA (VENTOLIN HFA) 108 (90 Base) MCG/ACT inhaler Inhale 2 puffs into the lungs every 4 hours as needed for Wheezing or Shortness of Breath (cough) 25   Félix Bautista MD   furosemide (LASIX) 40 MG tablet Take 1 tablet by mouth in the morning and 1 tablet in the evening. 25   Félix Bautista MD   spironolactone (ALDACTONE) 25 MG tablet Take 0.5 tablets by mouth daily 25   Félix Bautista MD   losartan (COZAAR) 50 MG tablet Take 1 tablet by mouth daily 24   Sekou Dyson MD   aspirin 81 MG

## 2025-07-01 NOTE — ED TRIAGE NOTES
BIBEMS from home with c/o CP and SOB that started at approx 0300 today.  Took 324mg ASA.  On EMS o2 for comfort.

## 2025-07-01 NOTE — CONSULTS
[facility] CARDIOLOGY                    Cardiology Care Note     [x]Initial Encounter     []Follow-up    Patient Name: Tiera Iraheta - :1931 - MRN:[id number]  Primary Cardiologist: Sekou Dyson MD  Consulting Cardiologist: Sekou Dyson MD     Reason for encounter: chest pain     HPI:       Tiera Iraheta is a 89 y.o. female with PMH significant for 89F complete heart block s/p PPM, HTN, HF pEF, PAD DM, p/w 1d chest pain and sob. Pt reports left sided nonradiating constant chest pain starting 2am w/ SOB at rest. It woke her up from sleep. No recent med changes or omissions per paitent. No palpitations, dizziness or syncope. Also reports dry cough and increased BLE swelling. No NV or diaphoresis     Subjective:      Tiera Iraheta reports chest pressure/discomfort, dyspnea, and fatigue.     Assessment and Plan     1 A/C  HFrEF  - Echo 25 - LVEF 35-40%  - HFrEF GDMT:        - ACE/ARB/ARNI:  losartan 50 mg (unable to use ARNI due to Angioedema on ACE-I)      - Beta Blocker: Toprol XL 50 mg daily       - MRA: aldactone 12.5 mg daily       - SGLT2i:  Allergy to Jardiance       - s/p lasix 80 mg IV in ED      - start lasix 40 mg IV BID from pm   - repeat limited ECHO           2 Elevated troponin  - rule out ACS  - trend troponin   - heparin per protocol  - echo  - ischemic eval based on on clinical course         3. Moderate AS  - Echo 25 - LVEF 35-40%, moderate Aortic stenosis  - follow OP         4. Dyslipidemia    - cont atorvastatin        5 CHB s/p PPM micra placed 5.23  - V paced on tele       CARDIOLOGY ATTENDING  Patient personally seen and examined. All the elements of history and examination were personally performed. Assessment and plan was discussed and agree.      Hx of HFrEF, moderate AS, and PPM   P/w SOB, swelling and chest pain     NAD, Hrt RRR, 2/6 systolic murmur, abd soft, + LE edema     Acute on chronic HFrEF  - Echo 25 - LVEF 35-40%, moderate AS  - BNP 6608 (25)   - CXR no

## 2025-07-02 ENCOUNTER — APPOINTMENT (OUTPATIENT)
Facility: HOSPITAL | Age: 89
DRG: 280 | End: 2025-07-02
Payer: MEDICARE

## 2025-07-02 LAB
ANION GAP SERPL CALC-SCNC: 8 MMOL/L (ref 2–12)
BASOPHILS # BLD: 0.1 K/UL (ref 0–0.1)
BASOPHILS NFR BLD: 1.6 % (ref 0–1)
BUN SERPL-MCNC: 26 MG/DL (ref 6–20)
BUN/CREAT SERPL: 17 (ref 12–20)
CALCIUM SERPL-MCNC: 8.7 MG/DL (ref 8.5–10.1)
CHLORIDE SERPL-SCNC: 106 MMOL/L (ref 97–108)
CHOLEST SERPL-MCNC: 137 MG/DL
CO2 SERPL-SCNC: 22 MMOL/L (ref 21–32)
CREAT SERPL-MCNC: 1.51 MG/DL (ref 0.55–1.02)
CRP SERPL-MCNC: 1.44 MG/DL (ref 0–0.3)
DIFFERENTIAL METHOD BLD: ABNORMAL
ECHO BSA: 1.79 M2
ECHO LV EDV A2C: 60 ML
ECHO LV EDV A4C: 63 ML
ECHO LV EDV BP: 61 ML (ref 56–104)
ECHO LV EDV INDEX A4C: 36 ML/M2
ECHO LV EDV INDEX BP: 35 ML/M2
ECHO LV EDV NDEX A2C: 34 ML/M2
ECHO LV EF PHYSICIAN: 25 %
ECHO LV EJECTION FRACTION A2C: 33 %
ECHO LV EJECTION FRACTION A4C: 23 %
ECHO LV ESV A2C: 40 ML
ECHO LV ESV A4C: 48 ML
ECHO LV ESV BP: 44 ML (ref 19–49)
ECHO LV ESV INDEX A2C: 23 ML/M2
ECHO LV ESV INDEX A4C: 27 ML/M2
ECHO LV ESV INDEX BP: 25 ML/M2
ECHO LV FRACTIONAL SHORTENING: 13 % (ref 28–44)
ECHO LV INTERNAL DIMENSION DIASTOLE INDEX: 3.03 CM/M2
ECHO LV INTERNAL DIMENSION DIASTOLIC: 5.3 CM (ref 3.9–5.3)
ECHO LV INTERNAL DIMENSION SYSTOLIC INDEX: 2.63 CM/M2
ECHO LV INTERNAL DIMENSION SYSTOLIC: 4.6 CM
ECHO LV IVSD: 1 CM (ref 0.6–0.9)
ECHO LV MASS 2D: 200.4 G (ref 67–162)
ECHO LV MASS INDEX 2D: 114.5 G/M2 (ref 43–95)
ECHO LV POSTERIOR WALL DIASTOLIC: 1 CM (ref 0.6–0.9)
ECHO LV RELATIVE WALL THICKNESS RATIO: 0.38
EOSINOPHIL # BLD: 0.25 K/UL (ref 0–0.4)
EOSINOPHIL NFR BLD: 4.1 % (ref 0–7)
ERYTHROCYTE [DISTWIDTH] IN BLOOD BY AUTOMATED COUNT: 14.5 % (ref 11.5–14.5)
EST. AVERAGE GLUCOSE BLD GHB EST-MCNC: 183 MG/DL
GLUCOSE BLD STRIP.AUTO-MCNC: 169 MG/DL (ref 65–117)
GLUCOSE BLD STRIP.AUTO-MCNC: 172 MG/DL (ref 65–117)
GLUCOSE BLD STRIP.AUTO-MCNC: 177 MG/DL (ref 65–117)
GLUCOSE BLD STRIP.AUTO-MCNC: 190 MG/DL (ref 65–117)
GLUCOSE SERPL-MCNC: 177 MG/DL (ref 65–100)
HBA1C MFR BLD: 8 % (ref 4–5.6)
HCT VFR BLD AUTO: 33.3 % (ref 35–47)
HDLC SERPL-MCNC: 46 MG/DL
HDLC SERPL: 3 (ref 0–5)
HGB BLD-MCNC: 11.5 G/DL (ref 11.5–16)
IMM GRANULOCYTES # BLD AUTO: 0.04 K/UL (ref 0–0.04)
IMM GRANULOCYTES NFR BLD AUTO: 0.7 % (ref 0–0.5)
LDLC SERPL CALC-MCNC: 67.8 MG/DL (ref 0–100)
LYMPHOCYTES # BLD: 1.9 K/UL (ref 0.8–3.5)
LYMPHOCYTES NFR BLD: 31.3 % (ref 12–49)
MAGNESIUM SERPL-MCNC: 2.2 MG/DL (ref 1.6–2.4)
MCH RBC QN AUTO: 26.4 PG (ref 26–34)
MCHC RBC AUTO-ENTMCNC: 34.5 G/DL (ref 30–36.5)
MCV RBC AUTO: 76.6 FL (ref 80–99)
MONOCYTES # BLD: 0.75 K/UL (ref 0–1)
MONOCYTES NFR BLD: 12.3 % (ref 5–13)
NEUTS SEG # BLD: 3.04 K/UL (ref 1.8–8)
NEUTS SEG NFR BLD: 50 % (ref 32–75)
NRBC # BLD: 0 K/UL (ref 0–0.01)
NRBC BLD-RTO: 0 PER 100 WBC
NT PRO BNP: 8404 PG/ML
NT PRO BNP: ABNORMAL PG/ML
PHOSPHATE SERPL-MCNC: 3.5 MG/DL (ref 2.6–4.7)
PLATELET # BLD AUTO: 238 K/UL (ref 150–400)
PMV BLD AUTO: 11.6 FL (ref 8.9–12.9)
POTASSIUM SERPL-SCNC: 3.9 MMOL/L (ref 3.5–5.1)
RBC # BLD AUTO: 4.35 M/UL (ref 3.8–5.2)
SERVICE CMNT-IMP: ABNORMAL
SODIUM SERPL-SCNC: 136 MMOL/L (ref 136–145)
TRIGL SERPL-MCNC: 116 MG/DL
TROPONIN I SERPL HS-MCNC: 5585 NG/L (ref 0–51)
TROPONIN I SERPL HS-MCNC: 6079 NG/L (ref 0–51)
TSH SERPL DL<=0.05 MIU/L-ACNC: 2.6 UIU/ML (ref 0.36–3.74)
UFH PPP CHRO-ACNC: 0.29 IU/ML
UFH PPP CHRO-ACNC: 0.31 IU/ML
UFH PPP CHRO-ACNC: 0.32 IU/ML
VLDLC SERPL CALC-MCNC: 23.2 MG/DL
WBC # BLD AUTO: 6.1 K/UL (ref 3.6–11)

## 2025-07-02 PROCEDURE — 93308 TTE F-UP OR LMTD: CPT | Performed by: SPECIALIST

## 2025-07-02 PROCEDURE — 84484 ASSAY OF TROPONIN QUANT: CPT

## 2025-07-02 PROCEDURE — 6370000000 HC RX 637 (ALT 250 FOR IP): Performed by: SPECIALIST

## 2025-07-02 PROCEDURE — 83036 HEMOGLOBIN GLYCOSYLATED A1C: CPT

## 2025-07-02 PROCEDURE — 97161 PT EVAL LOW COMPLEX 20 MIN: CPT

## 2025-07-02 PROCEDURE — 2500000003 HC RX 250 WO HCPCS: Performed by: INTERNAL MEDICINE

## 2025-07-02 PROCEDURE — 6370000000 HC RX 637 (ALT 250 FOR IP): Performed by: INTERNAL MEDICINE

## 2025-07-02 PROCEDURE — 2060000000 HC ICU INTERMEDIATE R&B

## 2025-07-02 PROCEDURE — 85025 COMPLETE CBC W/AUTO DIFF WBC: CPT

## 2025-07-02 PROCEDURE — 83880 ASSAY OF NATRIURETIC PEPTIDE: CPT

## 2025-07-02 PROCEDURE — 83735 ASSAY OF MAGNESIUM: CPT

## 2025-07-02 PROCEDURE — 99232 SBSQ HOSP IP/OBS MODERATE 35: CPT | Performed by: SPECIALIST

## 2025-07-02 PROCEDURE — 84100 ASSAY OF PHOSPHORUS: CPT

## 2025-07-02 PROCEDURE — 84443 ASSAY THYROID STIM HORMONE: CPT

## 2025-07-02 PROCEDURE — 80048 BASIC METABOLIC PNL TOTAL CA: CPT

## 2025-07-02 PROCEDURE — 97535 SELF CARE MNGMENT TRAINING: CPT

## 2025-07-02 PROCEDURE — 80061 LIPID PANEL: CPT

## 2025-07-02 PROCEDURE — 97165 OT EVAL LOW COMPLEX 30 MIN: CPT

## 2025-07-02 PROCEDURE — 94761 N-INVAS EAR/PLS OXIMETRY MLT: CPT

## 2025-07-02 PROCEDURE — 85520 HEPARIN ASSAY: CPT

## 2025-07-02 PROCEDURE — 82962 GLUCOSE BLOOD TEST: CPT

## 2025-07-02 PROCEDURE — APPSS45 APP SPLIT SHARED TIME 31-45 MINUTES: Performed by: NURSE PRACTITIONER

## 2025-07-02 PROCEDURE — 6370000000 HC RX 637 (ALT 250 FOR IP): Performed by: NURSE PRACTITIONER

## 2025-07-02 PROCEDURE — 93308 TTE F-UP OR LMTD: CPT

## 2025-07-02 PROCEDURE — 86140 C-REACTIVE PROTEIN: CPT

## 2025-07-02 PROCEDURE — 36415 COLL VENOUS BLD VENIPUNCTURE: CPT

## 2025-07-02 PROCEDURE — 97530 THERAPEUTIC ACTIVITIES: CPT

## 2025-07-02 RX ORDER — BISACODYL 10 MG
10 SUPPOSITORY, RECTAL RECTAL DAILY PRN
Status: DISCONTINUED | OUTPATIENT
Start: 2025-07-02 | End: 2025-07-03 | Stop reason: HOSPADM

## 2025-07-02 RX ORDER — ROSUVASTATIN CALCIUM 10 MG/1
20 TABLET, COATED ORAL NIGHTLY
Status: DISCONTINUED | OUTPATIENT
Start: 2025-07-02 | End: 2025-07-03 | Stop reason: HOSPADM

## 2025-07-02 RX ORDER — ASPIRIN 81 MG/1
81 TABLET, CHEWABLE ORAL DAILY
Status: DISCONTINUED | OUTPATIENT
Start: 2025-07-02 | End: 2025-07-03 | Stop reason: HOSPADM

## 2025-07-02 RX ORDER — SENNA AND DOCUSATE SODIUM 50; 8.6 MG/1; MG/1
1 TABLET, FILM COATED ORAL 2 TIMES DAILY
Status: DISCONTINUED | OUTPATIENT
Start: 2025-07-02 | End: 2025-07-03 | Stop reason: HOSPADM

## 2025-07-02 RX ORDER — FUROSEMIDE 40 MG/1
40 TABLET ORAL 2 TIMES DAILY
Status: DISCONTINUED | OUTPATIENT
Start: 2025-07-02 | End: 2025-07-03 | Stop reason: HOSPADM

## 2025-07-02 RX ORDER — CLOPIDOGREL BISULFATE 75 MG/1
300 TABLET ORAL ONCE
Status: COMPLETED | OUTPATIENT
Start: 2025-07-02 | End: 2025-07-02

## 2025-07-02 RX ORDER — MUSCLE RUB CREAM 100; 150 MG/G; MG/G
CREAM TOPICAL EVERY 6 HOURS PRN
Status: DISCONTINUED | OUTPATIENT
Start: 2025-07-02 | End: 2025-07-03 | Stop reason: HOSPADM

## 2025-07-02 RX ORDER — CLOPIDOGREL BISULFATE 75 MG/1
75 TABLET ORAL DAILY
Status: DISCONTINUED | OUTPATIENT
Start: 2025-07-03 | End: 2025-07-03 | Stop reason: HOSPADM

## 2025-07-02 RX ORDER — POLYETHYLENE GLYCOL 3350 17 G/17G
17 POWDER, FOR SOLUTION ORAL 2 TIMES DAILY
Status: DISCONTINUED | OUTPATIENT
Start: 2025-07-02 | End: 2025-07-03 | Stop reason: HOSPADM

## 2025-07-02 RX ADMIN — SODIUM CHLORIDE, PRESERVATIVE FREE 10 ML: 5 INJECTION INTRAVENOUS at 20:28

## 2025-07-02 RX ADMIN — DOCUSATE SODIUM 50MG AND SENNOSIDES 8.6MG 1 TABLET: 8.6; 5 TABLET, FILM COATED ORAL at 20:28

## 2025-07-02 RX ADMIN — METOPROLOL SUCCINATE 50 MG: 50 TABLET, EXTENDED RELEASE ORAL at 10:04

## 2025-07-02 RX ADMIN — FUROSEMIDE 40 MG: 40 TABLET ORAL at 10:04

## 2025-07-02 RX ADMIN — ROSUVASTATIN CALCIUM 20 MG: 10 TABLET, FILM COATED ORAL at 20:28

## 2025-07-02 RX ADMIN — FUROSEMIDE 40 MG: 40 TABLET ORAL at 17:33

## 2025-07-02 RX ADMIN — DOCUSATE SODIUM 50MG AND SENNOSIDES 8.6MG 1 TABLET: 8.6; 5 TABLET, FILM COATED ORAL at 10:04

## 2025-07-02 RX ADMIN — INSULIN LISPRO 1 UNITS: 100 INJECTION, SOLUTION INTRAVENOUS; SUBCUTANEOUS at 12:28

## 2025-07-02 RX ADMIN — POLYETHYLENE GLYCOL 3350 17 G: 17 POWDER, FOR SOLUTION ORAL at 20:28

## 2025-07-02 RX ADMIN — POLYETHYLENE GLYCOL 3350 17 G: 17 POWDER, FOR SOLUTION ORAL at 10:04

## 2025-07-02 RX ADMIN — ASPIRIN 81 MG: 81 TABLET, CHEWABLE ORAL at 10:04

## 2025-07-02 RX ADMIN — CLOPIDOGREL BISULFATE 300 MG: 75 TABLET, FILM COATED ORAL at 12:29

## 2025-07-02 RX ADMIN — LOSARTAN POTASSIUM 50 MG: 50 TABLET, FILM COATED ORAL at 10:04

## 2025-07-02 RX ADMIN — SODIUM CHLORIDE, PRESERVATIVE FREE 10 ML: 5 INJECTION INTRAVENOUS at 10:03

## 2025-07-02 ASSESSMENT — PAIN SCALES - GENERAL: PAINLEVEL_OUTOF10: 0

## 2025-07-02 NOTE — PLAN OF CARE
Problem: Physical Therapy - Adult  Goal: By Discharge: Performs mobility at highest level of function for planned discharge setting.  See evaluation for individualized goals.  Description: FUNCTIONAL STATUS PRIOR TO ADMISSION: Patient was modified independent using a rollator or  single point cane for functional mobility. She indicates preference for straight point cane use but daughter encourages walker use.    HOME SUPPORT PRIOR TO ADMISSION: The patient lived with daughter and son-in-law in 2 level home with bedroom on 2nd floor, typically able to navigate flight of stairs. Independent ADL's but daughter assists in/out of tub shower. 5 vs. 7 stairs to enter home with bilateral rails.    Physical Therapy Goals  Initiated 7/2/2025  1.  Patient will move from supine to sit and sit to supine and roll side to side in bed with independence within 7 day(s).    2.  Patient will perform sit to stand with modified independence within 7 day(s).  3.  Patient will transfer from bed to chair and chair to bed with modified independence using the least restrictive device within 7 day(s).  4.  Patient will ambulate with modified independence for 100 feet with the least restrictive device within 7 day(s).   5.  Patient will ascend/descend 5 stairs with 2 handrail(s) with contact guard assist within 7 day(s).   Outcome: Progressing     PHYSICAL THERAPY EVALUATION    Patient: Tiera Iraheta (89 y.o. female)  Date: 7/2/2025  Primary Diagnosis: Shortness of breath [R06.02]  ACS (acute coronary syndrome) ([facility]) [I24.9]  Acute on chronic HFrEF (heart failure with reduced ejection fraction) ([facility]) [I50.23]  Acute congestive heart failure, unspecified heart failure type ([facility]) [I50.9]       Precautions: Restrictions/Precautions  Restrictions/Precautions: General Precautions  Activity Level: Up with Assist            ASSESSMENT:  Patient is a 89 y.o. female with h/o HTN, CHF, DM, CAD, PPM admitted to [facility] on 7/1/25  [facility]  Dorothy Feldman M.D.  [address]  [facility] [address]  Fax: [phone]   Authorization for Release/Disclosure of   Protected Health Information   Name: MARZENA ALEXANDER : 1970 SSN: [id number]   Address: [address] [place] [address] Phone:    [phone] (home)    I authorize the entity listed below to release/disclose the PHI below to:   [facility]/Dorothy Feldman M.D. and Dorothy Feldman M.D.   Provider or Entity Name:  [facility]    [address]   [place] [address]        Phone:  [phone]       Fax:  [phone]      Reason for request: continuity of care   Information to be released:    [  ] LAST COLONOSCOPY,  including any PATH REPORT and follow-up  [  ] LAST FIT/COLOGUARD RESULT [  ] LAST DEXA  [  ] LAST MAMMOGRAM  [  ] LAST PAP  [  ] LAST LABS [  ] RETINA EXAM REPORT  [  ] IMMUNIZATION RECORDS  [XX ] Release all info      [  ] Check here and initial the line next to each item to release ALL health information INCLUDING  _____ Care and treatment for drug and / or alcohol abuse  _____ HIV testing, infection status, or AIDS  _____ Genetic Testing    DATES OF SERVICE OR TIME PERIOD TO BE DISCLOSED: _____________  I understand and acknowledge that:  * This Authorization may be revoked at any time by you in writing, except if your health information has already been used or disclosed.  * Your health information that will be used or disclosed as a result of you signing this authorization could be re-disclosed by the recipient. If this occurs, your re-disclosed health information may no longer be protected by State or Federal laws.  * You may refuse to sign this Authorization. Your refusal will not affect your ability to obtain treatment.  * This Authorization becomes effective upon signing and will  on (date) __________.      If no date is indicated, this Authorization will  one (1) year from the signature  date.    Name: Julisa Hopson    Signature:   Date:     6/18/2018       PLEASE FAX REQUESTED RECORDS BACK TO: [phone]

## 2025-07-02 NOTE — PLAN OF CARE
Problem: Occupational Therapy - Adult  Goal: By Discharge: Performs self-care activities at highest level of function for planned discharge setting.  See evaluation for individualized goals.  Description: FUNCTIONAL STATUS PRIOR TO ADMISSION:  Patient lives with her daughter and son-in-law and is Mod I with a rollator or single point cane for mobility and ADLs. Her daughter provides SPV for showering    Occupational Therapy Goals:  Initiated 7/2/2025  1.  Patient will perform grooming in standing with Modified [place] within 7 day(s).  2.  Patient will perform bathing sitting and standing with Stand by Assist within 7 day(s).  3.  Patient will perform shower transfer with Stand by Assist within 7 day(s).  4.  Patient will perform toilet transfers with Modified [place]  within 7 day(s).  5.  Patient will perform all aspects of toileting with Modified [place] within 7 day(s).  6.  Patient will participate in upper extremity therapeutic exercise/activities with [place] for 5 minutes within 7 day(s).    7.  Patient will utilize energy conservation techniques during functional activities with verbal cues within 7 day(s).    Outcome: Progressing    OCCUPATIONAL THERAPY EVALUATION    Patient: Tiera Iraheta (89 y.o. female)  Date: 7/2/2025  Primary Diagnosis: Shortness of breath [R06.02]  ACS (acute coronary syndrome) ([facility]) [I24.9]  Acute on chronic HFrEF (heart failure with reduced ejection fraction) ([facility]) [I50.23]  Acute congestive heart failure, unspecified heart failure type ([facility]) [I50.9]         Precautions: General Precautions                  ASSESSMENT :  The patient is limited by decreased functional mobility, independence in ADLs, ROM, strength, activity tolerance, and balance after presenting with L sided chest pain, patient and SOB.    She currently is close to her Mod I baseline, ambulating with RW use and completing standing grooming tasks with CGA. Patient requires seated rest breaks in

## 2025-07-02 NOTE — CARE COORDINATION
Care Management Progress Note    Reason for Admission:   Shortness of breath [R06.02]  ACS (acute coronary syndrome) ([facility]) [I24.9]  Acute on chronic HFrEF (heart failure with reduced ejection fraction) ([facility]) [I50.23]  Acute congestive heart failure, unspecified heart failure type ([facility]) [I50.9]         Patient Admission Status: Inpatient  RUR:   Hospitalization in the last 30 days (Readmission):  No        Transition of care plan:  Medical - continue to diurese  DC plan - home with family when stable, therapy cleared  Discharge plan communicated with patient and/or discharge caregiver: Yes    Date 1st [facility] letter given:   Outpatient follow-up - per medical team  Transport at discharge:  Family

## 2025-07-02 NOTE — SIGNIFICANT EVENT
Notified by nursing at 0610 of  trop of 6079 at 0610- pt without pain, remains on hep gtt- cards on board. Has ECHO pending for today. BP and HR have remained stable overnight. Will place NPO order for am - significant jump in trop this am. Nursing made aware of new NPO order and lab.

## 2025-07-03 VITALS
SYSTOLIC BLOOD PRESSURE: 155 MMHG | HEART RATE: 57 BPM | TEMPERATURE: 97.3 F | DIASTOLIC BLOOD PRESSURE: 71 MMHG | RESPIRATION RATE: 20 BRPM | BODY MASS INDEX: 27.99 KG/M2 | HEIGHT: 62 IN | WEIGHT: 152.12 LBS | OXYGEN SATURATION: 96 %

## 2025-07-03 LAB
ANION GAP SERPL CALC-SCNC: 9 MMOL/L (ref 2–12)
BUN SERPL-MCNC: 27 MG/DL (ref 6–20)
BUN/CREAT SERPL: 18 (ref 12–20)
CALCIUM SERPL-MCNC: 9.8 MG/DL (ref 8.5–10.1)
CHLORIDE SERPL-SCNC: 106 MMOL/L (ref 97–108)
CO2 SERPL-SCNC: 25 MMOL/L (ref 21–32)
CREAT SERPL-MCNC: 1.5 MG/DL (ref 0.55–1.02)
EKG DIAGNOSIS: NORMAL
EKG Q-T INTERVAL: 414 MS
EKG QTC CALCULATION (BAZETT): 565 MS
EKG R AXIS: 93 DEGREES
EKG T AXIS: 265 DEGREES
EKG VENTRICULAR RATE: 112 BPM
ERYTHROCYTE [DISTWIDTH] IN BLOOD BY AUTOMATED COUNT: 14.6 % (ref 11.5–14.5)
GLUCOSE BLD STRIP.AUTO-MCNC: 187 MG/DL (ref 65–117)
GLUCOSE BLD STRIP.AUTO-MCNC: 198 MG/DL (ref 65–117)
GLUCOSE SERPL-MCNC: 157 MG/DL (ref 65–100)
HCT VFR BLD AUTO: 36.8 % (ref 35–47)
HGB BLD-MCNC: 12.9 G/DL (ref 11.5–16)
MAGNESIUM SERPL-MCNC: 2.2 MG/DL (ref 1.6–2.4)
MCH RBC QN AUTO: 27 PG (ref 26–34)
MCHC RBC AUTO-ENTMCNC: 35.1 G/DL (ref 30–36.5)
MCV RBC AUTO: 77 FL (ref 80–99)
NRBC # BLD: 0 K/UL (ref 0–0.01)
NRBC BLD-RTO: 0 PER 100 WBC
NT PRO BNP: 8152 PG/ML
PHOSPHATE SERPL-MCNC: 3.8 MG/DL (ref 2.6–4.7)
PLATELET # BLD AUTO: 280 K/UL (ref 150–400)
PMV BLD AUTO: 11.9 FL (ref 8.9–12.9)
POTASSIUM SERPL-SCNC: 4.2 MMOL/L (ref 3.5–5.1)
RBC # BLD AUTO: 4.78 M/UL (ref 3.8–5.2)
SERVICE CMNT-IMP: ABNORMAL
SERVICE CMNT-IMP: ABNORMAL
SODIUM SERPL-SCNC: 140 MMOL/L (ref 136–145)
TROPONIN I SERPL HS-MCNC: 3681 NG/L (ref 0–51)
WBC # BLD AUTO: 8 K/UL (ref 3.6–11)

## 2025-07-03 PROCEDURE — 97530 THERAPEUTIC ACTIVITIES: CPT

## 2025-07-03 PROCEDURE — 85027 COMPLETE CBC AUTOMATED: CPT

## 2025-07-03 PROCEDURE — 82962 GLUCOSE BLOOD TEST: CPT

## 2025-07-03 PROCEDURE — 84484 ASSAY OF TROPONIN QUANT: CPT

## 2025-07-03 PROCEDURE — 99232 SBSQ HOSP IP/OBS MODERATE 35: CPT | Performed by: INTERNAL MEDICINE

## 2025-07-03 PROCEDURE — 6370000000 HC RX 637 (ALT 250 FOR IP): Performed by: INTERNAL MEDICINE

## 2025-07-03 PROCEDURE — 94761 N-INVAS EAR/PLS OXIMETRY MLT: CPT

## 2025-07-03 PROCEDURE — 6370000000 HC RX 637 (ALT 250 FOR IP): Performed by: NURSE PRACTITIONER

## 2025-07-03 PROCEDURE — 84100 ASSAY OF PHOSPHORUS: CPT

## 2025-07-03 PROCEDURE — APPSS30 APP SPLIT SHARED TIME 16-30 MINUTES: Performed by: NURSE PRACTITIONER

## 2025-07-03 PROCEDURE — 6370000000 HC RX 637 (ALT 250 FOR IP): Performed by: SPECIALIST

## 2025-07-03 PROCEDURE — 36415 COLL VENOUS BLD VENIPUNCTURE: CPT

## 2025-07-03 PROCEDURE — 93010 ELECTROCARDIOGRAM REPORT: CPT | Performed by: SPECIALIST

## 2025-07-03 PROCEDURE — 83735 ASSAY OF MAGNESIUM: CPT

## 2025-07-03 PROCEDURE — 83880 ASSAY OF NATRIURETIC PEPTIDE: CPT

## 2025-07-03 PROCEDURE — 97535 SELF CARE MNGMENT TRAINING: CPT

## 2025-07-03 PROCEDURE — 97116 GAIT TRAINING THERAPY: CPT

## 2025-07-03 PROCEDURE — 80048 BASIC METABOLIC PNL TOTAL CA: CPT

## 2025-07-03 PROCEDURE — 2500000003 HC RX 250 WO HCPCS: Performed by: INTERNAL MEDICINE

## 2025-07-03 RX ORDER — CLOPIDOGREL BISULFATE 75 MG/1
75 TABLET ORAL DAILY
Qty: 30 TABLET | Refills: 0 | Status: SHIPPED | OUTPATIENT
Start: 2025-07-04

## 2025-07-03 RX ORDER — ROSUVASTATIN CALCIUM 20 MG/1
20 TABLET, COATED ORAL NIGHTLY
Qty: 30 TABLET | Refills: 0 | Status: SHIPPED | OUTPATIENT
Start: 2025-07-03

## 2025-07-03 RX ADMIN — METOPROLOL SUCCINATE 50 MG: 50 TABLET, EXTENDED RELEASE ORAL at 08:12

## 2025-07-03 RX ADMIN — DOCUSATE SODIUM 50MG AND SENNOSIDES 8.6MG 1 TABLET: 8.6; 5 TABLET, FILM COATED ORAL at 08:12

## 2025-07-03 RX ADMIN — LOSARTAN POTASSIUM 50 MG: 50 TABLET, FILM COATED ORAL at 08:12

## 2025-07-03 RX ADMIN — ASPIRIN 81 MG: 81 TABLET, CHEWABLE ORAL at 08:12

## 2025-07-03 RX ADMIN — POLYETHYLENE GLYCOL 3350 17 G: 17 POWDER, FOR SOLUTION ORAL at 08:12

## 2025-07-03 RX ADMIN — SODIUM CHLORIDE, PRESERVATIVE FREE 10 ML: 5 INJECTION INTRAVENOUS at 08:13

## 2025-07-03 RX ADMIN — CLOPIDOGREL BISULFATE 75 MG: 75 TABLET, FILM COATED ORAL at 08:12

## 2025-07-03 RX ADMIN — INSULIN LISPRO 1 UNITS: 100 INJECTION, SOLUTION INTRAVENOUS; SUBCUTANEOUS at 08:11

## 2025-07-03 RX ADMIN — FUROSEMIDE 40 MG: 40 TABLET ORAL at 08:12

## 2025-07-03 NOTE — PLAN OF CARE
Problem: Occupational Therapy - Adult  Goal: By Discharge: Performs self-care activities at highest level of function for planned discharge setting.  See evaluation for individualized goals.  Description: FUNCTIONAL STATUS PRIOR TO ADMISSION:  Patient lives with her daughter and son-in-law and is Mod I with a rollator or single point cane for mobility and ADLs. Her daughter provides SPV for showering    Occupational Therapy Goals:  Initiated 7/2/2025  1.  Patient will perform grooming in standing with Modified [place] within 7 day(s).  2.  Patient will perform bathing sitting and standing with Stand by Assist within 7 day(s).  3.  Patient will perform shower transfer with Stand by Assist within 7 day(s).  4.  Patient will perform toilet transfers with Modified [place]  within 7 day(s).  5.  Patient will perform all aspects of toileting with Modified [place] within 7 day(s).  6.  Patient will participate in upper extremity therapeutic exercise/activities with [place] for 5 minutes within 7 day(s).    7.  Patient will utilize energy conservation techniques during functional activities with verbal cues within 7 day(s).    Outcome: Progressing   OCCUPATIONAL THERAPY TREATMENT  Patient: Tiera Iraheta (89 y.o. female)  Date: 7/3/2025  Primary Diagnosis: Shortness of breath [R06.02]  ACS (acute coronary syndrome) ([facility]) [I24.9]  Acute on chronic HFrEF (heart failure with reduced ejection fraction) ([facility]) [I50.23]  Acute congestive heart failure, unspecified heart failure type ([facility]) [I50.9]       Precautions: General Precautions                Chart, occupational therapy assessment, plan of care, and goals were reviewed.    ASSESSMENT  Patient continues to benefit from skilled OT services and is progressing towards goals. Ms. Iraheta was received in bed agreeable to activity.  Patient performed bed mobility, sit to stand transfers, ambulation to/from bathroom, toilet transfers, and dynamic standing tasks

## 2025-07-03 NOTE — PLAN OF CARE
Problem: Physical Therapy - Adult  Goal: By Discharge: Performs mobility at highest level of function for planned discharge setting.  See evaluation for individualized goals.  Description: FUNCTIONAL STATUS PRIOR TO ADMISSION: Patient was modified independent using a rollator or  single point cane for functional mobility. She indicates preference for straight point cane use but daughter encourages walker use.    HOME SUPPORT PRIOR TO ADMISSION: The patient lived with daughter and son-in-law in 2 level home with bedroom on 2nd floor, typically able to navigate flight of stairs. Independent ADL's but daughter assists in/out of tub shower. 5 vs. 7 stairs to enter home with bilateral rails.    Physical Therapy Goals  Initiated 7/2/2025  1.  Patient will move from supine to sit and sit to supine and roll side to side in bed with independence within 7 day(s).    2.  Patient will perform sit to stand with modified independence within 7 day(s).  3.  Patient will transfer from bed to chair and chair to bed with modified independence using the least restrictive device within 7 day(s).  4.  Patient will ambulate with modified independence for 100 feet with the least restrictive device within 7 day(s).   5.  Patient will ascend/descend 5 stairs with 2 handrail(s) with contact guard assist within 7 day(s).   Outcome: Progressing   PHYSICAL THERAPY TREATMENT    Patient: Tiera Iraheta (89 y.o. female)  Date: 7/3/2025  Diagnosis: Shortness of breath [R06.02]  ACS (acute coronary syndrome) ([facility]) [I24.9]  Acute on chronic HFrEF (heart failure with reduced ejection fraction) ([facility]) [I50.23]  Acute congestive heart failure, unspecified heart failure type ([facility]) [I50.9] Acute on chronic HFrEF (heart failure with reduced ejection fraction) ([facility])      Precautions: Restrictions/Precautions  Restrictions/Precautions: General Precautions  Activity Level: Up with Assist            ASSESSMENT:  Patient continues to benefit from skilled PT

## 2025-07-03 NOTE — PROGRESS NOTES
[facility] CARDIOLOGY                    Cardiology Care Note     []Initial Encounter     [x]Follow-up    Patient Name: Tiera Iraheta - :1931 - MRN:[id number]  Primary Cardiologist: Sekou Dyson MD  Consulting Cardiologist: Sekou Dyson MD     Reason for encounter: chest pain     HPI:       Tiera Iraheta is a 89 y.o. female with PMH significant for complete heart block s/p PPM, HTN, HF pEF, PAD DM, p/w 1d chest pain and sob. Pt reports left sided nonradiating constant chest pain starting 2am w/ SOB at rest. It woke her up from sleep. No recent med changes or omissions per paitent. No palpitations, dizziness or syncope. Also reports dry cough and increased BLE swelling. No NV or diaphoresis.     Subjective:      Tiera Iraheta reports no cp or SOB. She is ready to go home      Assessment and Plan     1 A/C  HFrEF  - Echo 25 - LVEF 35-40%,  this admission   - HFrEF GDMT:        - ACE/ARB/ARNI:  losartan 50 mg (unable to use ARNI due to Angioedema on ACE-I)      - Beta Blocker: Toprol XL 50 mg daily       - MRA: aldactone 12.5 mg daily       - SGLT2i:  Allergy to Jardiance       - s/p lasix 80 mg IV in ED      - resume lasix 40 mg po BID            2 NSTEMI  - troponin peak ~ 6000  - s/p heparin per protocol for 48 hours   -reviewed mgt option with patient cath vs medical mgt, she opts for medical mgt unless symptoms recur  - loaded with plavix  - cont plavix 75 mg qd   - added crestor 20 mg qd   - cont asa         3. Moderate AS  - Echo 25 - LVEF 35-40%, now 25-30% , moderate Aortic stenosis  - follow OP   - AV mean gradient 21 mm hg        4. Dyslipidemia    - crestor 20 mg qd        5 CHB s/p PPM micra placed 5.23  - V paced on tele     6 CKD:   - Cr 1.51    Ok for discharge today   Future Appointments   Date Time Provider Department Center   2025 10:00 AM Sheryl Duenas APRN - NP [facility] AMB   2025 10:40 AM Sekou Dyson MD [facility] AMB   9/10/2025  9:00 AM PACEMAKER, 
[facility]  [address] [place] [address] [phone]        Hospitalist Progress Note      NAME: Tiera Iraheta   :  1931  MRM:  [id number]    Date/Time of service: 2025  12:07 PM       Subjective:     Chief Complaint:  Patient was personally seen and examined by me during this time period.  Chart reviewed.  Still with dyspnea.  No chest pain       Objective:       Vitals:       Last 24hrs VS reviewed since prior progress note. Most recent are:    Vitals:    25 0852   BP: (!) 147/62   Pulse: 54   Resp: 18   Temp: 97.9 °F (36.6 °C)   SpO2: 93%     SpO2 Readings from Last 6 Encounters:   25 93%   25 96%   25 94%   25 92%   24 91%   12/10/24 95%          Intake/Output Summary (Last 24 hours) at 2025 1207  Last data filed at 2025 1836  Gross per 24 hour   Intake --   Output 1000 ml   Net -1000 ml        Exam:     Physical Exam:    Gen:  elderly, pleasant, NAD  HEENT:  Pink conjunctivae, PERRL, hearing intact to voice, moist mucous membranes  Neck:  Supple, without masses, thyroid non-tender  Resp:  No accessory muscle use, trace crackles  Card:  2/6 murmurs, normal S1, S2 without thrills, +edema  Abd:  Soft, non-tender, non-distended, normoactive bowel sounds are present  Musc:  No cyanosis or clubbing  Skin:  No rashes   Neuro:  Cranial nerves 3-12 are grossly intact, follows commands appropriately  Psych:  Good insight, oriented to person, place and time, alert    Medications Reviewed: (see below)    Lab Data Reviewed: (see below)    ______________________________________________________________________    Medications:     Current Facility-Administered Medications   Medication Dose Route Frequency    muscle rub cream   Topical Q6H PRN    aspirin chewable tablet 81 mg  81 mg Oral Daily    furosemide (LASIX) tablet 40 mg  40 mg Oral BID    rosuvastatin (CRESTOR) tablet 20 mg  20 mg Oral Nightly    bisacodyl (DULCOLAX) 
Spiritual Care Partner Volunteer visited patient at [facility] in [facility] INTERMEDIATE CARE UNIT on 7/2/2025     Documented by:  Chaplain Nate Casas M.Div., [facility].  For  support, please call Spiritual Health Team @ [phone])    
non-distended, BS+   MSK: No cyanosis  Skin: No rashes    Neuro: Moving all four extremities, follows commands appropriately  Psych: Good insight, oriented to person, place, alert, Nml Affect  LE: trace > + 1+ LE  edema    Data Review:     Radiology:   XR Results (most recent):Xray Result (most recent):  XR CHEST PORTABLE 07/01/2025    Narrative  EXAM:  XR CHEST PORTABLE    INDICATION: cp    COMPARISON: 5/23/2025    TECHNIQUE: portable chest AP view    FINDINGS: Left-sided pacemaker is stable in position. There is unchanged mild  cardiomegaly. The pulmonary vasculature is within normal limits.    The lungs and pleural spaces are clear. The visualized bones and upper abdomen  are age-appropriate.    Impression  Unchanged mild cardiomegaly. No acute pulmonary process.      Electronically signed by Darrell Dixon       CT Result (most recent):  CTA CHEST W WO CONTRAST 07/01/2025    Narrative  EXAM:  CTA CHEST W WO CONTRAST    INDICATION: Chest pain and shortness of breath    COMPARISON: Radiograph 7/1/2025. CT 5/5/2025.    TECHNIQUE: Helical thin section chest CT following uneventful intravenous  administration of nonionic contrast according to departmental PE protocol.  Coronal and sagittal reformats were performed. 3D/MIP post processing was  performed. CT dose reduction was achieved through use of a standardized protocol  tailored for this examination and automatic exposure control for dose  modulation.    FINDINGS: This is a good quality study for the evaluation of pulmonary embolism  to the first subsegmental arterial level. There is no pulmonary embolism to this  level.    The left chest ICD and wires are stable. The visualized thyroid gland is  unremarkable. The aorta and main pulmonary artery are stable in caliber. The  main pulmonary artery is dilated measuring 3.4 cm in diameter in keeping with  pulmonary artery hypertension. 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

## 2025-07-03 NOTE — DISCHARGE SUMMARY
[facility]  [address] [place], [place] [id number] [phone]    [facility] Adult  Hospitalist Group     Discharge Summary       PATIENT ID: Tiera Iraheta  MRN: [id number]   YOB: 1931    DATE OF ADMISSION: 7/1/2025 10:37 AM    DATE OF DISCHARGE: 07/03/25   PRIMARY CARE PROVIDER: Cliff Orourke MD     DISCHARGING PROVIDER: Leah Solis MD      CONSULTATIONS: IP CONSULT TO CARDIOLOGY    PROCEDURES/SURGERIES: * No surgery found *    ADMITTING DIAGNOSES & HOSPITAL COURSE:     89 yo hx of HTN, DM, CAD, bradycardia s/p pacer, sCHF EF 35-40%, presented w/ chest pain, SOB, acute CHF, NSTEMI      1) NSTEMI: Trop peaked at 6,079.  Echo with EF 25-30%  Patient declined cath.  completed heparin gtt, ASA, plavix, toprol, statin.     2) Acute on chronic sCHF: last echo w/ EF 35-40%, repeat echo as above.  Will cont bumex.  Monitor I/O, BMP.  Cards evalauted     3) Bradycardia/heart block: s/p pacer.       4) HTN:cont metoprolol, losartan     5) DM type 2: last A1C 9.1%.      6) CKD 3: stable        PENDING TEST RESULTS:   At the time of discharge the following test results are still pending: none    FOLLOW UP APPOINTMENTS:    Sheryl Duenas, APRN - NP  [id number] [facility]  [address]  [facility] [id number] [phone]    Follow up on 7/17/2025  10 am    Cliff Orourke MD  [address] [facility] [address] [phone]    Follow up in 1 week(s)  Discharge follow up         DIET: diabetic     ACTIVITY: as tolerated       DISCHARGE MEDICATIONS:     Medication List        START taking these medications      clopidogrel 75 MG tablet  Commonly known as: PLAVIX  Take 1 tablet by mouth daily  Start taking on: July 4, 2025     rosuvastatin 20 MG tablet  Commonly known as: CRESTOR  Take 1 tablet by mouth nightly            CONTINUE taking these medications      albuterol sulfate  (90 Base) MCG/ACT inhaler  Commonly known as: Ventolin

## 2025-07-17 ENCOUNTER — OFFICE VISIT (OUTPATIENT)
Age: 89
End: 2025-07-17
Payer: MEDICARE

## 2025-07-17 VITALS
HEART RATE: 57 BPM | BODY MASS INDEX: 27.79 KG/M2 | OXYGEN SATURATION: 91 % | WEIGHT: 151 LBS | DIASTOLIC BLOOD PRESSURE: 60 MMHG | HEIGHT: 62 IN | SYSTOLIC BLOOD PRESSURE: 132 MMHG

## 2025-07-17 DIAGNOSIS — I50.20 HFREF (HEART FAILURE WITH REDUCED EJECTION FRACTION) (HCC): ICD-10-CM

## 2025-07-17 DIAGNOSIS — R06.02 SOB (SHORTNESS OF BREATH): Primary | ICD-10-CM

## 2025-07-17 DIAGNOSIS — I10 PRIMARY HYPERTENSION: ICD-10-CM

## 2025-07-17 DIAGNOSIS — I42.9 CARDIOMYOPATHY, UNSPECIFIED TYPE (HCC): ICD-10-CM

## 2025-07-17 DIAGNOSIS — I35.0 NONRHEUMATIC AORTIC VALVE STENOSIS: ICD-10-CM

## 2025-07-17 DIAGNOSIS — Z95.0 PRESENCE OF CARDIAC PACEMAKER: ICD-10-CM

## 2025-07-17 PROCEDURE — 99215 OFFICE O/P EST HI 40 MIN: CPT

## 2025-07-17 PROCEDURE — 1123F ACP DISCUSS/DSCN MKR DOCD: CPT

## 2025-07-17 PROCEDURE — 1090F PRES/ABSN URINE INCON ASSESS: CPT

## 2025-07-17 PROCEDURE — 1036F TOBACCO NON-USER: CPT

## 2025-07-17 PROCEDURE — 1111F DSCHRG MED/CURRENT MED MERGE: CPT

## 2025-07-17 PROCEDURE — G8427 DOCREV CUR MEDS BY ELIG CLIN: HCPCS

## 2025-07-17 PROCEDURE — G8419 CALC BMI OUT NRM PARAM NOF/U: HCPCS

## 2025-07-17 PROCEDURE — 1159F MED LIST DOCD IN RCRD: CPT

## 2025-07-17 PROCEDURE — 1160F RVW MEDS BY RX/DR IN RCRD: CPT

## 2025-07-17 RX ORDER — FUROSEMIDE 40 MG/1
40 TABLET ORAL 2 TIMES DAILY
Qty: 180 TABLET | Refills: 1 | Status: SHIPPED | OUTPATIENT
Start: 2025-07-17

## 2025-07-17 RX ORDER — CLOPIDOGREL BISULFATE 75 MG/1
75 TABLET ORAL DAILY
Qty: 30 TABLET | Refills: 0 | Status: SHIPPED | OUTPATIENT
Start: 2025-07-17

## 2025-07-17 RX ORDER — SPIRONOLACTONE 25 MG/1
12.5 TABLET ORAL DAILY
Qty: 45 TABLET | Refills: 1 | Status: SHIPPED | OUTPATIENT
Start: 2025-07-17

## 2025-07-17 RX ORDER — ROSUVASTATIN CALCIUM 20 MG/1
20 TABLET, COATED ORAL NIGHTLY
Qty: 90 TABLET | Refills: 3 | Status: SHIPPED | OUTPATIENT
Start: 2025-07-17

## 2025-07-17 RX ORDER — METOPROLOL SUCCINATE 50 MG/1
50 TABLET, EXTENDED RELEASE ORAL DAILY
Qty: 90 TABLET | Refills: 3 | Status: SHIPPED | OUTPATIENT
Start: 2025-07-17

## 2025-07-17 NOTE — PROGRESS NOTES
Chief Complaint   Patient presents with    Congestive Heart Failure     NSTEMI     Vitals:    07/17/25 0957   BP: 132/60   BP Site: Left Upper Arm   Patient Position: Sitting   Pulse: 57   SpO2: 91%   Weight: 68.5 kg (151 lb)   Height: 1.575 m (5' 2\")     Chest pain: DENIED     Recent hospital stays: DENIED     Refills: DENIED     WHY PLAVIX @ NIGHT?

## 2025-07-17 NOTE — PROGRESS NOTES
Patient: Tiera Iraheta  : 1931    Primary Cardiologist: Sekou Dyson MD. [facility]    Today's Date: 2025        HISTORY OF PRESENT ILLNESS:     History of Present Illness:  Admitted -25 with acute heart failure. Lasix dose was adjusted, unable to tolerate jardiance.   Admitted -25 with complete heart block. History of medtronic dual chamber PPM with RV pacing lead fractured and unable to pace. Leadless pacemaker was implanted.   Admitted -7/3/25 with NSTEMI and acute CHF.  Echo declined to 25-30%, troponin peak 6K declined cath on this admission.   Today she continues with some shortness of breath. Says fatigues easily. Takes multiple breaks and then recovers. Denies lower extremity edema.   Up multiple times per night to use the restroom, asks about reduction in diuretic. Does not weigh self daily.       PAST MEDICAL HISTORY:     Past Medical History:   Diagnosis Date    Aortic stenosis     Central retinal artery occlusion      - lost 90% of vision of left eye    Coronary atherosclerosis of native coronary artery     adeno card : fixed inf defect, EF 47%. Echo : EF 55%, mild MR.    Diabetes ([facility])     HFrEF (heart failure with reduced ejection fraction) ([facility])     HTN (hypertension)     Mobitz (type) II atrioventricular block     Pure hypercholesterolemia        Past Surgical History:   Procedure Laterality Date    CARDIAC PROCEDURE N/A 2025    Insert temporary pacemaker performed by Navdeep Villareal MD at [facility] CARDIAC CATH LAB    EP DEVICE PROCEDURE N/A 2025    Insert or replace transcath PPM leadless performed by Navdeep Villareal MD at [facility] CARDIAC CATH LAB    INVASIVE VASCULAR N/A 2025    Ultrasound guided vascular access performed by Navdeep Villareal MD at [facility] CARDIAC CATH LAB    INVASIVE VASCULAR N/A 2025    Venogram upper ext bilat performed by Navdeep Villareal MD at [facility] CARDIAC CATH LAB    PACEMAKER  2009    Ventricular lead failure. No access through

## 2025-07-17 NOTE — PATIENT INSTRUCTIONS
You can take lasix 40 mg AM, 20 mg PM (around 3pm).   If you begin to gain weight (3lbs overnight, 5 lbs in 1 week, increased shortness of breath, or swelling) you can take full tablet in PM.

## (undated) DEVICE — SYRINGE MED 50ML LUERLOCK TIP

## (undated) DEVICE — DILATOR COONS TAPER: Brand: COONS

## (undated) DEVICE — SLEEVE CONTAMINATION SFSHTH

## (undated) DEVICE — DILATOR: Brand: COOK

## (undated) DEVICE — PINNACLE INTRODUCER SHEATH: Brand: PINNACLE

## (undated) DEVICE — AMPLATZ EXTRA STIFF WIRE GUIDE: Brand: AMPLATZ

## (undated) DEVICE — COVER US PRB W15XL120CM W/ GEL RUBBERBAND TAPE STRP FLD GEN

## (undated) DEVICE — 6 FOOT DISPOSABLE EXTENSION CABLE WITH SAFE CONNECT / SCREW-DOWN

## (undated) DEVICE — PERCLOSE PROGLIDE™ SUTURE-MEDIATED CLOSURE SYSTEM: Brand: PERCLOSE PROGLIDE™

## (undated) DEVICE — INTRODUCER SHTH W51XH73XL557MM D13MM DIA7.8MM HYDRPHLC

## (undated) DEVICE — PRESSURE MONITORING SET: Brand: TRUWAVE

## (undated) DEVICE — LIQUIBAND RAPID ADHESIVE 36/CS 0.8ML: Brand: MEDLINE

## (undated) DEVICE — NEEDLE ANGIO 18GA L9CM NRML 1 WALL SMOOTH FINISH CLR HUB FOR